# Patient Record
Sex: FEMALE | Race: BLACK OR AFRICAN AMERICAN | Employment: OTHER | URBAN - METROPOLITAN AREA
[De-identification: names, ages, dates, MRNs, and addresses within clinical notes are randomized per-mention and may not be internally consistent; named-entity substitution may affect disease eponyms.]

---

## 2019-12-04 ENCOUNTER — APPOINTMENT (OUTPATIENT)
Dept: GENERAL RADIOLOGY | Age: 29
End: 2019-12-04
Attending: EMERGENCY MEDICINE
Payer: MEDICAID

## 2019-12-04 ENCOUNTER — HOSPITAL ENCOUNTER (EMERGENCY)
Age: 29
Discharge: HOME | End: 2019-12-04
Attending: EMERGENCY MEDICINE
Payer: MEDICAID

## 2019-12-04 VITALS
HEART RATE: 68 BPM | TEMPERATURE: 98.4 F | SYSTOLIC BLOOD PRESSURE: 117 MMHG | OXYGEN SATURATION: 99 % | RESPIRATION RATE: 21 BRPM | DIASTOLIC BLOOD PRESSURE: 75 MMHG

## 2019-12-04 DIAGNOSIS — R07.89 ATYPICAL CHEST PAIN: Primary | ICD-10-CM

## 2019-12-04 LAB
ALBUMIN SERPL-MCNC: 3.3 G/DL (ref 3.4–5)
ALBUMIN/GLOB SERPL: 0.8 {RATIO} (ref 0.8–1.7)
ALP SERPL-CCNC: 64 U/L (ref 45–117)
ALT SERPL-CCNC: 20 U/L (ref 13–56)
ANION GAP SERPL CALC-SCNC: 5 MMOL/L (ref 3–18)
AST SERPL-CCNC: 9 U/L (ref 10–38)
ATRIAL RATE: 54 BPM
BASOPHILS # BLD: 0 K/UL (ref 0–0.1)
BASOPHILS NFR BLD: 0 % (ref 0–2)
BILIRUB SERPL-MCNC: 0.2 MG/DL (ref 0.2–1)
BUN SERPL-MCNC: 6 MG/DL (ref 7–18)
BUN/CREAT SERPL: 9 (ref 12–20)
CALCIUM SERPL-MCNC: 8.3 MG/DL (ref 8.5–10.1)
CALCULATED P AXIS, ECG09: 66 DEGREES
CALCULATED R AXIS, ECG10: 80 DEGREES
CALCULATED T AXIS, ECG11: 83 DEGREES
CHLORIDE SERPL-SCNC: 110 MMOL/L (ref 100–111)
CK MB CFR SERPL CALC: NORMAL % (ref 0–4)
CK MB SERPL-MCNC: <1 NG/ML (ref 5–25)
CK SERPL-CCNC: 60 U/L (ref 26–192)
CO2 SERPL-SCNC: 26 MMOL/L (ref 21–32)
CREAT SERPL-MCNC: 0.64 MG/DL (ref 0.6–1.3)
DIAGNOSIS, 93000: NORMAL
DIFFERENTIAL METHOD BLD: ABNORMAL
EOSINOPHIL # BLD: 0.1 K/UL (ref 0–0.4)
EOSINOPHIL NFR BLD: 1 % (ref 0–5)
ERYTHROCYTE [DISTWIDTH] IN BLOOD BY AUTOMATED COUNT: 14.1 % (ref 11.6–14.5)
GLOBULIN SER CALC-MCNC: 3.9 G/DL (ref 2–4)
GLUCOSE SERPL-MCNC: 106 MG/DL (ref 74–99)
HCT VFR BLD AUTO: 30.2 % (ref 35–45)
HGB BLD-MCNC: 9.7 G/DL (ref 12–16)
LYMPHOCYTES # BLD: 2.1 K/UL (ref 0.9–3.6)
LYMPHOCYTES NFR BLD: 32 % (ref 21–52)
MCH RBC QN AUTO: 27.6 PG (ref 24–34)
MCHC RBC AUTO-ENTMCNC: 32.1 G/DL (ref 31–37)
MCV RBC AUTO: 85.8 FL (ref 74–97)
MONOCYTES # BLD: 0.5 K/UL (ref 0.05–1.2)
MONOCYTES NFR BLD: 8 % (ref 3–10)
NEUTS SEG # BLD: 4 K/UL (ref 1.8–8)
NEUTS SEG NFR BLD: 59 % (ref 40–73)
P-R INTERVAL, ECG05: 170 MS
PLATELET # BLD AUTO: 376 K/UL (ref 135–420)
PMV BLD AUTO: 9.9 FL (ref 9.2–11.8)
POTASSIUM SERPL-SCNC: 3.7 MMOL/L (ref 3.5–5.5)
PROT SERPL-MCNC: 7.2 G/DL (ref 6.4–8.2)
Q-T INTERVAL, ECG07: 470 MS
QRS DURATION, ECG06: 84 MS
QTC CALCULATION (BEZET), ECG08: 445 MS
RBC # BLD AUTO: 3.52 M/UL (ref 4.2–5.3)
SODIUM SERPL-SCNC: 141 MMOL/L (ref 136–145)
TROPONIN I SERPL-MCNC: <0.02 NG/ML (ref 0–0.04)
VENTRICULAR RATE, ECG03: 54 BPM
WBC # BLD AUTO: 6.8 K/UL (ref 4.6–13.2)

## 2019-12-04 PROCEDURE — 82550 ASSAY OF CK (CPK): CPT

## 2019-12-04 PROCEDURE — 99285 EMERGENCY DEPT VISIT HI MDM: CPT

## 2019-12-04 PROCEDURE — 71045 X-RAY EXAM CHEST 1 VIEW: CPT

## 2019-12-04 PROCEDURE — 80053 COMPREHEN METABOLIC PANEL: CPT

## 2019-12-04 PROCEDURE — 85025 COMPLETE CBC W/AUTO DIFF WBC: CPT

## 2019-12-04 PROCEDURE — 93005 ELECTROCARDIOGRAM TRACING: CPT

## 2019-12-04 NOTE — DISCHARGE INSTRUCTIONS

## 2019-12-04 NOTE — ED TRIAGE NOTES
Patient came in by medic with complaint of chest pain. Patient states \"Today, I have been having chest pain on and off all day\". Patient is A/Ox4 and resting in bed requesting blanket and pillows.

## 2019-12-04 NOTE — ED PROVIDER NOTES
EMERGENCY DEPARTMENT HISTORY AND PHYSICAL EXAM    4:47 AM      Date: 12/4/2019  Patient Name: Beulah Zhong    History of Presenting Illness     Chief Complaint   Patient presents with    Chest Pain         History Provided By: Patient    Additional History (Context): Beulah Zhong is a 30 y.o. female with no relevant past medical history who presents with pain in the lower central part of her chest without radiation. She describes it is sharp, intermittent, denies nausea, vomiting, shortness of breath, lightheadedness, sweating or other associated symptoms. Denies any recent illness. PCP: None        Past History     Past Medical History:  History reviewed. No pertinent past medical history. Past Surgical History:  History reviewed. No pertinent surgical history. Family History:  History reviewed. No pertinent family history. Social History:  Social History     Tobacco Use    Smoking status: Never Smoker    Smokeless tobacco: Never Used   Substance Use Topics    Alcohol use: Not Currently    Drug use: Never       Allergies:  No Known Allergies      Review of Systems       Review of Systems   Constitutional: Negative for activity change and appetite change. HENT: Negative for congestion. Eyes: Negative for visual disturbance. Respiratory: Negative for cough and shortness of breath. Cardiovascular: Positive for chest pain. Gastrointestinal: Negative for abdominal pain, diarrhea, nausea and vomiting. Genitourinary: Negative for dysuria. Musculoskeletal: Negative for arthralgias and myalgias. Skin: Negative for rash. Neurological: Negative for weakness and numbness. Physical Exam     Visit Vitals  /68   Pulse (!) 56   Temp 98.4 °F (36.9 °C)   Resp 15   SpO2 99%         Physical Exam  Vitals signs and nursing note reviewed. Constitutional:       Appearance: She is well-developed. HENT:      Head: Normocephalic and atraumatic.    Eyes: Conjunctiva/sclera: Conjunctivae normal.   Neck:      Musculoskeletal: Normal range of motion and neck supple. Vascular: No JVD. Cardiovascular:      Rate and Rhythm: Normal rate and regular rhythm. Heart sounds: Normal heart sounds. No murmur. Pulmonary:      Effort: Pulmonary effort is normal.      Breath sounds: Normal breath sounds. Abdominal:      General: Bowel sounds are normal. There is no distension. Palpations: Abdomen is soft. Tenderness: There is no tenderness. Musculoskeletal: Normal range of motion. General: No deformity. Lymphadenopathy:      Cervical: No cervical adenopathy. Skin:     General: Skin is warm and dry. Findings: No rash. Neurological:      Mental Status: She is alert and oriented to person, place, and time. Coordination: Coordination normal.           Diagnostic Study Results     Labs -  Recent Results (from the past 12 hour(s))   CBC WITH AUTOMATED DIFF    Collection Time: 12/04/19  1:21 AM   Result Value Ref Range    WBC 6.8 4.6 - 13.2 K/uL    RBC 3.52 (L) 4.20 - 5.30 M/uL    HGB 9.7 (L) 12.0 - 16.0 g/dL    HCT 30.2 (L) 35.0 - 45.0 %    MCV 85.8 74.0 - 97.0 FL    MCH 27.6 24.0 - 34.0 PG    MCHC 32.1 31.0 - 37.0 g/dL    RDW 14.1 11.6 - 14.5 %    PLATELET 269 415 - 046 K/uL    MPV 9.9 9.2 - 11.8 FL    NEUTROPHILS 59 40 - 73 %    LYMPHOCYTES 32 21 - 52 %    MONOCYTES 8 3 - 10 %    EOSINOPHILS 1 0 - 5 %    BASOPHILS 0 0 - 2 %    ABS. NEUTROPHILS 4.0 1.8 - 8.0 K/UL    ABS. LYMPHOCYTES 2.1 0.9 - 3.6 K/UL    ABS. MONOCYTES 0.5 0.05 - 1.2 K/UL    ABS. EOSINOPHILS 0.1 0.0 - 0.4 K/UL    ABS.  BASOPHILS 0.0 0.0 - 0.1 K/UL    DF AUTOMATED     METABOLIC PANEL, COMPREHENSIVE    Collection Time: 12/04/19  1:21 AM   Result Value Ref Range    Sodium 141 136 - 145 mmol/L    Potassium 3.7 3.5 - 5.5 mmol/L    Chloride 110 100 - 111 mmol/L    CO2 26 21 - 32 mmol/L    Anion gap 5 3.0 - 18 mmol/L    Glucose 106 (H) 74 - 99 mg/dL    BUN 6 (L) 7.0 - 18 MG/DL Creatinine 0.64 0.6 - 1.3 MG/DL    BUN/Creatinine ratio 9 (L) 12 - 20      GFR est AA >60 >60 ml/min/1.73m2    GFR est non-AA >60 >60 ml/min/1.73m2    Calcium 8.3 (L) 8.5 - 10.1 MG/DL    Bilirubin, total 0.2 0.2 - 1.0 MG/DL    ALT (SGPT) 20 13 - 56 U/L    AST (SGOT) 9 (L) 10 - 38 U/L    Alk. phosphatase 64 45 - 117 U/L    Protein, total 7.2 6.4 - 8.2 g/dL    Albumin 3.3 (L) 3.4 - 5.0 g/dL    Globulin 3.9 2.0 - 4.0 g/dL    A-G Ratio 0.8 0.8 - 1.7     CARDIAC PANEL,(CK, CKMB & TROPONIN)    Collection Time: 12/04/19  1:21 AM   Result Value Ref Range    CK 60 26 - 192 U/L    CK - MB <1.0 <3.6 ng/ml    CK-MB Index  0.0 - 4.0 %     CALCULATION NOT PERFORMED WHEN RESULT IS BELOW LINEAR LIMIT    Troponin-I, QT <0.02 0.0 - 0.045 NG/ML       Radiologic Studies -   XR CHEST PORT    (Results Pending)         Medical Decision Making   I am the first provider for this patient. I reviewed the vital signs, available nursing notes, past medical history, past surgical history, family history and social history. Vital Signs-Reviewed the patient's vital signs. EKG:  Normal sinus rhythm, rate 54, , QTc 445. No acute ST or T wave changes, no STEMI. Records Reviewed: Nursing Notes (Time of Review: 4:47 AM)      Provider Notes (Medical Decision Making):   Balta Schafer is a 30 y.o. female with no relevant past medical history who presents with pain in the lower central part of her chest without radiation. She describes it is sharp, intermittent, denies nausea, vomiting, shortness of breath, lightheadedness, sweating or other associated symptoms. Denies any recent illness. She appears well on exam, EKG is unremarkable. Differential Diagnosis: Patient with atypical chest pain, question reflux, inflammatory or muscular condition. Given her well appearance, lack of risk factors I do not suspect ACS, I do not suspect pulmonary embolism, dissection or other acute etiology.     Testing: Chest x-ray, labs ordered from triage were unremarkable  Treatments: Tylenol, ibuprofen as needed. Given return precautions and home care instructions. Diagnosis     Clinical Impression:   1. Atypical chest pain        Disposition: Charge    Follow-up Information     Follow up With Specialties Details Why [facility]    [facility] EMERGENCY DEPT Emergency Medicine  If symptoms worsen [address] [place] [id number]  [phone]           Patient's Medications    No medications on file     _______________________________    Attestations:  Karissa Ortega MD acting as a scribe for and in the presence of Joann Hernandez MD      December 04, 2019 at 4:47 AM       Provider Attestation:      I personally performed the services described in the documentation, reviewed the documentation, as recorded by the scribe in my presence, and it accurately and completely records my words and actions.  December 04, 2019 at 4:47 AM - Joann Hernandez MD    _______________________________

## 2019-12-20 ENCOUNTER — HOSPITAL ENCOUNTER (EMERGENCY)
Age: 29
Discharge: PSYCHIATRIC OR SUBSTANCE USE FACILITY | End: 2019-12-21
Attending: EMERGENCY MEDICINE
Payer: MEDICAID

## 2019-12-20 DIAGNOSIS — R44.0 AUDITORY HALLUCINATIONS: ICD-10-CM

## 2019-12-20 DIAGNOSIS — F10.929 ALCOHOLIC INTOXICATION WITH COMPLICATION (HCC): ICD-10-CM

## 2019-12-20 DIAGNOSIS — R45.851 DEPRESSION WITH SUICIDAL IDEATION: ICD-10-CM

## 2019-12-20 DIAGNOSIS — F32.A DEPRESSION WITH SUICIDAL IDEATION: ICD-10-CM

## 2019-12-20 DIAGNOSIS — R45.850 HOMICIDAL IDEATIONS: Primary | ICD-10-CM

## 2019-12-20 LAB
ALBUMIN SERPL-MCNC: 3.7 G/DL (ref 3.4–5)
ALBUMIN/GLOB SERPL: 0.8 {RATIO} (ref 0.8–1.7)
ALP SERPL-CCNC: 82 U/L (ref 45–117)
ALT SERPL-CCNC: 139 U/L (ref 13–56)
AMPHET UR QL SCN: NEGATIVE
ANION GAP SERPL CALC-SCNC: 6 MMOL/L (ref 3–18)
APPEARANCE UR: CLEAR
AST SERPL-CCNC: 54 U/L (ref 10–38)
BACTERIA URNS QL MICRO: NEGATIVE /HPF
BARBITURATES UR QL SCN: NEGATIVE
BASOPHILS # BLD: 0 K/UL (ref 0–0.1)
BASOPHILS NFR BLD: 0 % (ref 0–2)
BENZODIAZ UR QL: NEGATIVE
BILIRUB SERPL-MCNC: 0.2 MG/DL (ref 0.2–1)
BILIRUB UR QL: NEGATIVE
BUN SERPL-MCNC: 9 MG/DL (ref 7–18)
BUN/CREAT SERPL: 13 (ref 12–20)
CALCIUM SERPL-MCNC: 9.5 MG/DL (ref 8.5–10.1)
CANNABINOIDS UR QL SCN: NEGATIVE
CHLORIDE SERPL-SCNC: 104 MMOL/L (ref 100–111)
CO2 SERPL-SCNC: 24 MMOL/L (ref 21–32)
COCAINE UR QL SCN: NEGATIVE
COLOR UR: YELLOW
CREAT SERPL-MCNC: 0.67 MG/DL (ref 0.6–1.3)
DIFFERENTIAL METHOD BLD: ABNORMAL
EOSINOPHIL # BLD: 0 K/UL (ref 0–0.4)
EOSINOPHIL NFR BLD: 0 % (ref 0–5)
EPITH CASTS URNS QL MICRO: NORMAL /LPF (ref 0–5)
ERYTHROCYTE [DISTWIDTH] IN BLOOD BY AUTOMATED COUNT: 13.7 % (ref 11.6–14.5)
ETHANOL SERPL-MCNC: 133 MG/DL (ref 0–3)
ETHANOL SERPL-MCNC: <3 MG/DL (ref 0–3)
GLOBULIN SER CALC-MCNC: 4.4 G/DL (ref 2–4)
GLUCOSE SERPL-MCNC: 116 MG/DL (ref 74–99)
GLUCOSE UR STRIP.AUTO-MCNC: NEGATIVE MG/DL
HCG UR QL: NEGATIVE
HCT VFR BLD AUTO: 34 % (ref 35–45)
HDSCOM,HDSCOM: NORMAL
HGB BLD-MCNC: 10.7 G/DL (ref 12–16)
HGB UR QL STRIP: ABNORMAL
KETONES UR QL STRIP.AUTO: NEGATIVE MG/DL
LEUKOCYTE ESTERASE UR QL STRIP.AUTO: NEGATIVE
LITHIUM SERPL-SCNC: 0.5 MMOL/L (ref 0.6–1.2)
LYMPHOCYTES # BLD: 1.3 K/UL (ref 0.9–3.6)
LYMPHOCYTES NFR BLD: 16 % (ref 21–52)
MCH RBC QN AUTO: 27.9 PG (ref 24–34)
MCHC RBC AUTO-ENTMCNC: 31.5 G/DL (ref 31–37)
MCV RBC AUTO: 88.8 FL (ref 74–97)
METHADONE UR QL: NEGATIVE
MONOCYTES # BLD: 0.2 K/UL (ref 0.05–1.2)
MONOCYTES NFR BLD: 2 % (ref 3–10)
NEUTS SEG # BLD: 6.9 K/UL (ref 1.8–8)
NEUTS SEG NFR BLD: 82 % (ref 40–73)
NITRITE UR QL STRIP.AUTO: NEGATIVE
OPIATES UR QL: NEGATIVE
PCP UR QL: NEGATIVE
PH UR STRIP: 5.5 [PH] (ref 5–8)
PLATELET # BLD AUTO: 310 K/UL (ref 135–420)
PMV BLD AUTO: 9.3 FL (ref 9.2–11.8)
POTASSIUM SERPL-SCNC: 4.2 MMOL/L (ref 3.5–5.5)
PROT SERPL-MCNC: 8.1 G/DL (ref 6.4–8.2)
PROT UR STRIP-MCNC: NEGATIVE MG/DL
RBC # BLD AUTO: 3.83 M/UL (ref 4.2–5.3)
RBC #/AREA URNS HPF: NORMAL /HPF (ref 0–5)
SODIUM SERPL-SCNC: 134 MMOL/L (ref 136–145)
SP GR UR REFRACTOMETRY: <1.005 (ref 1–1.03)
UROBILINOGEN UR QL STRIP.AUTO: 0.2 EU/DL (ref 0.2–1)
WBC # BLD AUTO: 8.4 K/UL (ref 4.6–13.2)
WBC URNS QL MICRO: NEGATIVE /HPF (ref 0–4)

## 2019-12-20 PROCEDURE — 99285 EMERGENCY DEPT VISIT HI MDM: CPT

## 2019-12-20 PROCEDURE — 74011250637 HC RX REV CODE- 250/637: Performed by: EMERGENCY MEDICINE

## 2019-12-20 PROCEDURE — 81025 URINE PREGNANCY TEST: CPT

## 2019-12-20 PROCEDURE — 80307 DRUG TEST PRSMV CHEM ANLYZR: CPT

## 2019-12-20 PROCEDURE — 80178 ASSAY OF LITHIUM: CPT

## 2019-12-20 PROCEDURE — 81001 URINALYSIS AUTO W/SCOPE: CPT

## 2019-12-20 PROCEDURE — 80053 COMPREHEN METABOLIC PANEL: CPT

## 2019-12-20 PROCEDURE — 85025 COMPLETE CBC W/AUTO DIFF WBC: CPT

## 2019-12-20 RX ORDER — TRAZODONE HYDROCHLORIDE 50 MG/1
100 TABLET ORAL
Status: DISCONTINUED | OUTPATIENT
Start: 2019-12-20 | End: 2019-12-21 | Stop reason: HOSPADM

## 2019-12-20 RX ORDER — LITHIUM CARBONATE 300 MG/1
300 TABLET, FILM COATED, EXTENDED RELEASE ORAL 2 TIMES DAILY
Status: DISCONTINUED | OUTPATIENT
Start: 2019-12-20 | End: 2019-12-21 | Stop reason: HOSPADM

## 2019-12-20 RX ORDER — RISPERIDONE 1 MG/1
2 TABLET, FILM COATED ORAL
Status: DISCONTINUED | OUTPATIENT
Start: 2019-12-20 | End: 2019-12-21 | Stop reason: HOSPADM

## 2019-12-20 RX ADMIN — RISPERIDONE 2 MG: 1 TABLET ORAL at 02:19

## 2019-12-20 RX ADMIN — LITHIUM CARBONATE 300 MG: 300 TABLET, EXTENDED RELEASE ORAL at 08:32

## 2019-12-20 RX ADMIN — TRAZODONE HYDROCHLORIDE 100 MG: 50 TABLET ORAL at 02:19

## 2019-12-20 RX ADMIN — LITHIUM CARBONATE 300 MG: 300 TABLET, EXTENDED RELEASE ORAL at 18:44

## 2019-12-20 RX ADMIN — TRAZODONE HYDROCHLORIDE 100 MG: 50 TABLET ORAL at 22:02

## 2019-12-20 RX ADMIN — RISPERIDONE 2 MG: 1 TABLET ORAL at 22:02
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
## 2019-12-20 NOTE — PROGRESS NOTES
Call placed to [facility], [facility] behavioral health unit. Spoke with charge nurse Nelson and told that pt will not be considered for admission as she has recently discharged. Call received from [facility] at [address] [place] [address] who has requested to speak with pt directly. [facility] transferred to main ED.        Gustavo Gallego MSN, RN, ACM-RN   ED Outcomes Manager  [phone] (phone)

## 2019-12-20 NOTE — PROGRESS NOTES
Call received from Barron Jiménez at [facility] notifying me that pt was declined by their physician as she was just discharged from [facility] and needed to return there for continuity of care. I informed Barron Jiménez that the pt was declined by [facility] and Barron Jiménez replied \"well that's between you and them. \"          Ti Ricci, MSN, RN, ACM-RN   ED Outcomes Manager  [phone] (phone)

## 2019-12-20 NOTE — ANCILLARY DISCHARGE INSTRUCTIONS
Call made to [facility], [phone], spoke with Monae, will review patient's chart. Patient is willing to travel for treatment.
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
## 2019-12-20 NOTE — ED PROVIDER NOTES
Mauricio Minor is a 30 y.o. female who was just discharged from [facility] today and has history of schizoaffective disorder with complaints of feeling homicidal with plans to shoot other people and possibly herself and having auditory hallucinations. Patient does not actually have access to a weapon. Patient states she also lost her medications. She has no other acute medical complaints. She also states she lost her medications when she was discharged    The history is provided by the patient and medical records. Past Medical History:   Diagnosis Date    Schizoaffective disorder ([facility] [address]        History reviewed. No pertinent surgical history. History reviewed. No pertinent family history.     Social History     Socioeconomic History    Marital status: SINGLE     Spouse name: Not on file    Number of children: Not on file    Years of education: Not on file    Highest education level: Not on file   Occupational History    Not on file   Social Needs    Financial resource strain: Not on file    Food insecurity:     Worry: Not on file     Inability: Not on file    Transportation needs:     Medical: Not on file     Non-medical: Not on file   Tobacco Use    Smoking status: Never Smoker    Smokeless tobacco: Never Used   Substance and Sexual Activity    Alcohol use: Not Currently    Drug use: Never    Sexual activity: Not on file   Lifestyle    Physical activity:     Days per week: Not on file     Minutes per session: Not on file    Stress: Not on file   Relationships    Social connections:     Talks on phone: Not on file     Gets together: Not on file     Attends Zoroastrian service: Not on file     Active member of club or organization: Not on file     Attends meetings of clubs or organizations: Not on file     Relationship status: Not on file    Intimate partner violence:     Fear of current or ex partner: Not on file     Emotionally abused: Not on file     Physically abused: Not on file     Forced sexual activity: Not on file   Other Topics Concern    Not on file   Social History Narrative    Not on file         ALLERGIES: Patient has no known allergies. Review of Systems   Constitutional: Negative for fever. HENT: Negative for sore throat. Eyes: Negative for visual disturbance. Respiratory: Negative for shortness of breath. Gastrointestinal: Negative for abdominal pain. Genitourinary: Negative for difficulty urinating. Musculoskeletal: Negative for gait problem. Skin: Negative for rash. Allergic/Immunologic: Negative for immunocompromised state. Neurological: Negative for syncope. Psychiatric/Behavioral: Positive for hallucinations, sleep disturbance and suicidal ideas. Vitals:    12/20/19 0134   BP: 122/79   Pulse: 86   Resp: 16   Temp: 97.6 °F (36.4 °C)   SpO2: 100%   Weight: 85.3 kg (188 lb)   Height: 5' 7\" (1.702 m)            Physical Exam  Vitals signs and nursing note reviewed. Constitutional:       General: She is not in acute distress. Appearance: She is well-developed. She is obese. She is not ill-appearing, toxic-appearing or diaphoretic. HENT:      Head: Normocephalic and atraumatic. Right Ear: External ear normal.      Left Ear: External ear normal.      Nose: Nose normal.      Mouth/Throat:      Pharynx: Uvula midline. Eyes:      General: No scleral icterus. Conjunctiva/sclera: Conjunctivae normal.   Neck:      Musculoskeletal: Neck supple. Cardiovascular:      Rate and Rhythm: Normal rate and regular rhythm. Heart sounds: Normal heart sounds. Pulmonary:      Effort: Pulmonary effort is normal.      Breath sounds: Normal breath sounds. Abdominal:      Palpations: Abdomen is soft. Tenderness: There is no tenderness. Skin:     General: Skin is warm and dry. Capillary Refill: Capillary refill takes less than 2 seconds.    Neurological:      Mental Status: She is alert and oriented to person, place, and time.      Gait: Gait normal.   Psychiatric:         Attention and Perception: She perceives auditory hallucinations. Mood and Affect: Mood is anxious. Speech: Speech normal.         Behavior: Behavior normal.         Thought Content: Thought content includes homicidal and suicidal ideation. Thought content includes homicidal and suicidal plan. MDM       Procedures    Vitals:  Patient Vitals for the past 12 hrs:   Temp Pulse Resp BP SpO2   12/20/19 0134 97.6 °F (36.4 °C) 86 16 122/79 100 %         Medications ordered:   Medications   lithium carbonate SR (LITHOBID) tablet 300 mg (has no administration in time range)   risperiDONE (RisperDAL) tablet 2 mg (2 mg Oral Given 12/20/19 0219)   traZODone (DESYREL) tablet 100 mg (100 mg Oral Given 12/20/19 0219)         Lab findings:  Recent Results (from the past 12 hour(s))   CBC WITH AUTOMATED DIFF    Collection Time: 12/20/19  2:13 AM   Result Value Ref Range    WBC 8.4 4.6 - 13.2 K/uL    RBC 3.83 (L) 4.20 - 5.30 M/uL    HGB 10.7 (L) 12.0 - 16.0 g/dL    HCT 34.0 (L) 35.0 - 45.0 %    MCV 88.8 74.0 - 97.0 FL    MCH 27.9 24.0 - 34.0 PG    MCHC 31.5 31.0 - 37.0 g/dL    RDW 13.7 11.6 - 14.5 %    PLATELET 105 397 - 406 K/uL    MPV 9.3 9.2 - 11.8 FL    NEUTROPHILS 82 (H) 40 - 73 %    LYMPHOCYTES 16 (L) 21 - 52 %    MONOCYTES 2 (L) 3 - 10 %    EOSINOPHILS 0 0 - 5 %    BASOPHILS 0 0 - 2 %    ABS. NEUTROPHILS 6.9 1.8 - 8.0 K/UL    ABS. LYMPHOCYTES 1.3 0.9 - 3.6 K/UL    ABS. MONOCYTES 0.2 0.05 - 1.2 K/UL    ABS. EOSINOPHILS 0.0 0.0 - 0.4 K/UL    ABS.  BASOPHILS 0.0 0.0 - 0.1 K/UL    DF AUTOMATED     METABOLIC PANEL, COMPREHENSIVE    Collection Time: 12/20/19  2:13 AM   Result Value Ref Range    Sodium 134 (L) 136 - 145 mmol/L    Potassium 4.2 3.5 - 5.5 mmol/L    Chloride 104 100 - 111 mmol/L    CO2 24 21 - 32 mmol/L    Anion gap 6 3.0 - 18 mmol/L    Glucose 116 (H) 74 - 99 mg/dL    BUN 9 7.0 - 18 MG/DL    Creatinine 0.67 0.6 - 1.3 MG/DL    BUN/Creatinine ratio 13 12 - 20      GFR est AA >60 >60 ml/min/1.73m2    GFR est non-AA >60 >60 ml/min/1.73m2    Calcium 9.5 8.5 - 10.1 MG/DL    Bilirubin, total 0.2 0.2 - 1.0 MG/DL    ALT (SGPT) 139 (H) 13 - 56 U/L    AST (SGOT) 54 (H) 10 - 38 U/L    Alk. phosphatase 82 45 - 117 U/L    Protein, total 8.1 6.4 - 8.2 g/dL    Albumin 3.7 3.4 - 5.0 g/dL    Globulin 4.4 (H) 2.0 - 4.0 g/dL    A-G Ratio 0.8 0.8 - 1.7     ETHYL ALCOHOL    Collection Time: 12/20/19  2:13 AM   Result Value Ref Range    ALCOHOL(ETHYL),SERUM 133 (H) 0 - 3 MG/DL   URINALYSIS W/ RFLX MICROSCOPIC    Collection Time: 12/20/19  2:13 AM   Result Value Ref Range    Color YELLOW      Appearance CLEAR      Specific gravity <1.005 (L) 1.005 - 1.030    pH (UA) 5.5 5.0 - 8.0      Protein NEGATIVE  NEG mg/dL    Glucose NEGATIVE  NEG mg/dL    Ketone NEGATIVE  NEG mg/dL    Bilirubin NEGATIVE  NEG      Blood LARGE (A) NEG      Urobilinogen 0.2 0.2 - 1.0 EU/dL    Nitrites NEGATIVE  NEG      Leukocyte Esterase NEGATIVE  NEG     HCG URINE, QL    Collection Time: 12/20/19  2:13 AM   Result Value Ref Range    HCG urine, QL NEGATIVE  NEG     DRUG SCREEN, URINE    Collection Time: 12/20/19  2:13 AM   Result Value Ref Range    BENZODIAZEPINES NEGATIVE  NEG      BARBITURATES NEGATIVE  NEG      THC (TH-CANNABINOL) NEGATIVE  NEG      OPIATES NEGATIVE  NEG      PCP(PHENCYCLIDINE) NEGATIVE  NEG      COCAINE NEGATIVE  NEG      AMPHETAMINES NEGATIVE  NEG      METHADONE NEGATIVE  NEG      HDSCOM (NOTE)    URINE MICROSCOPIC ONLY    Collection Time: 12/20/19  2:13 AM   Result Value Ref Range    WBC NEGATIVE  0 - 4 /hpf    RBC 11 to 20 0 - 5 /hpf    Epithelial cells FEW 0 - 5 /lpf    Bacteria NEGATIVE  NEG /hpf       EKG interpretation by ED Physician:      X-Ray, CT or other radiology findings or impressions:  No orders to display       Progress notes, Consult notes or additional Procedure notes:   Patient seen by tele-psychiatry who recommends inpatient the patient is voluntary.     We will place patient back on her medications as noted in her other records. Will turn over to Dr. Primo Wilkes at approximate 6 AM to follow-up on placement. Reevaluation of patient:   stable with no acute medical condition that would prevent transfer to mental health facility    Disposition:  Diagnosis:   1. Homicidal ideations    2. Auditory hallucinations    3. Depression with suicidal ideation    4.  Alcoholic intoxication with complication ([facility] [address]        Disposition: pending    Follow-up Information    None           Patient's Medications    No medications on file

## 2019-12-20 NOTE — ED TRIAGE NOTES
Pt ambulatory to triage c/o suicidal ideation with thoughts of using a firearm to end her life, was just discharged from  psych today, states she was d/c with medications that were filled but lost. Generalized HI without plan. Denies hearing voices, but states \"voices talk for me\". Initially denies visual hallucinations but continues to say \"I see things, faces of dead people\". Admits to occasional marijuana use and frequent ETOH abuse, states had about 4 beers prior to coming to ED tonight. Pt states she does not feel safe as she is homeless and has often \"had sex with someone to sleep at their place\", however denies any abuse.

## 2019-12-20 NOTE — PROGRESS NOTES
Pt noted to have been discharged from  Psych yesterday, per triage note. Call placed to [facility] to inquire about admission. Per Shane Osorio in admissions, pt has not been to their facility since July 2019. Shane Osorio stated that she will review pt for possible bed availability. Information faxed. Call placed to [facility] behavioral health unit. I spoke with , Sisi Douglas who has confirmed that pt was recently hospitalized there. Sisi Douglas stated that the charge nurse was off the unit, and requested information to be faxed. Information faxed.            Vinay Herrera, MSN, RN, ACM-RN   ED Outcomes Manager  [phone] (phone)

## 2019-12-21 ENCOUNTER — HOSPITAL ENCOUNTER (INPATIENT)
Age: 29
LOS: 9 days | Discharge: LEFT BEFORE DISCHARGE | DRG: 750 | End: 2019-12-30
Attending: PSYCHIATRY & NEUROLOGY | Admitting: PSYCHIATRY & NEUROLOGY
Payer: MEDICAID

## 2019-12-21 VITALS
OXYGEN SATURATION: 100 % | WEIGHT: 188 LBS | SYSTOLIC BLOOD PRESSURE: 114 MMHG | DIASTOLIC BLOOD PRESSURE: 67 MMHG | RESPIRATION RATE: 16 BRPM | HEART RATE: 76 BPM | HEIGHT: 67 IN | BODY MASS INDEX: 29.51 KG/M2 | TEMPERATURE: 97.1 F

## 2019-12-21 PROCEDURE — 65220000003 HC RM SEMIPRIVATE PSYCH

## 2019-12-21 PROCEDURE — 74011250637 HC RX REV CODE- 250/637: Performed by: EMERGENCY MEDICINE

## 2019-12-21 PROCEDURE — 74011250637 HC RX REV CODE- 250/637: Performed by: NURSE PRACTITIONER

## 2019-12-21 RX ORDER — BENZTROPINE MESYLATE 1 MG/1
1 TABLET ORAL
Status: DISCONTINUED | OUTPATIENT
Start: 2019-12-21 | End: 2019-12-30 | Stop reason: HOSPADM

## 2019-12-21 RX ORDER — RISPERIDONE 1 MG/1
2 TABLET, FILM COATED ORAL
Status: DISCONTINUED | OUTPATIENT
Start: 2019-12-21 | End: 2019-12-23

## 2019-12-21 RX ORDER — RISPERIDONE 2 MG/1
2 TABLET, FILM COATED ORAL
COMMUNITY
Start: 2019-12-19 | End: 2019-12-30

## 2019-12-21 RX ORDER — TRAZODONE HYDROCHLORIDE 100 MG/1
100 TABLET ORAL
COMMUNITY
Start: 2019-12-19 | End: 2019-12-30

## 2019-12-21 RX ORDER — TRAZODONE HYDROCHLORIDE 50 MG/1
50 TABLET ORAL
Status: DISCONTINUED | OUTPATIENT
Start: 2019-12-21 | End: 2019-12-30 | Stop reason: HOSPADM

## 2019-12-21 RX ORDER — LITHIUM CARBONATE 300 MG/1
300 TABLET, FILM COATED, EXTENDED RELEASE ORAL 2 TIMES DAILY
Status: DISCONTINUED | OUTPATIENT
Start: 2019-12-21 | End: 2019-12-23

## 2019-12-21 RX ORDER — LORAZEPAM 2 MG/ML
1 INJECTION INTRAMUSCULAR
Status: DISCONTINUED | OUTPATIENT
Start: 2019-12-21 | End: 2019-12-30 | Stop reason: HOSPADM

## 2019-12-21 RX ORDER — ACETAMINOPHEN 325 MG/1
650 TABLET ORAL
Status: DISCONTINUED | OUTPATIENT
Start: 2019-12-21 | End: 2019-12-30 | Stop reason: HOSPADM

## 2019-12-21 RX ORDER — OLANZAPINE 5 MG/1
5 TABLET ORAL
Status: DISCONTINUED | OUTPATIENT
Start: 2019-12-21 | End: 2019-12-30 | Stop reason: HOSPADM

## 2019-12-21 RX ORDER — TRAZODONE HYDROCHLORIDE 100 MG/1
100 TABLET ORAL
Status: DISCONTINUED | OUTPATIENT
Start: 2019-12-21 | End: 2019-12-24

## 2019-12-21 RX ORDER — ADHESIVE BANDAGE
30 BANDAGE TOPICAL DAILY PRN
Status: DISCONTINUED | OUTPATIENT
Start: 2019-12-21 | End: 2019-12-30 | Stop reason: HOSPADM

## 2019-12-21 RX ORDER — LITHIUM CARBONATE 300 MG/1
300 TABLET, FILM COATED, EXTENDED RELEASE ORAL 2 TIMES DAILY
COMMUNITY
Start: 2019-12-19 | End: 2019-12-30

## 2019-12-21 RX ORDER — HYDROXYZINE 25 MG/1
50 TABLET, FILM COATED ORAL
Status: DISCONTINUED | OUTPATIENT
Start: 2019-12-21 | End: 2019-12-30 | Stop reason: HOSPADM

## 2019-12-21 RX ORDER — DIPHENHYDRAMINE HYDROCHLORIDE 50 MG/ML
50 INJECTION, SOLUTION INTRAMUSCULAR; INTRAVENOUS
Status: DISCONTINUED | OUTPATIENT
Start: 2019-12-21 | End: 2019-12-30 | Stop reason: HOSPADM

## 2019-12-21 RX ORDER — HALOPERIDOL 5 MG/ML
5 INJECTION INTRAMUSCULAR
Status: DISCONTINUED | OUTPATIENT
Start: 2019-12-21 | End: 2019-12-30 | Stop reason: HOSPADM

## 2019-12-21 RX ADMIN — RISPERIDONE 2 MG: 1 TABLET ORAL at 21:11

## 2019-12-21 RX ADMIN — LITHIUM CARBONATE 300 MG: 300 TABLET, EXTENDED RELEASE ORAL at 08:09

## 2019-12-21 RX ADMIN — LITHIUM CARBONATE 300 MG: 300 TABLET, FILM COATED, EXTENDED RELEASE ORAL at 18:20

## 2019-12-21 RX ADMIN — TRAZODONE HYDROCHLORIDE 100 MG: 100 TABLET ORAL at 21:11

## 2019-12-21 NOTE — PROGRESS NOTES
Patient has an accepting bed at [facility] accepting MD Dr. Spenser Horan, NP Jose Mi to room, 327    Report # [phone]

## 2019-12-21 NOTE — PROGRESS NOTES
Spoke with Beth Hussein she stated that they may still be on a stand still, but took information and will confirm with MD and nursing. Spoke with Julia Fuentes at [address], will review and let us know if can accommodate. Spoke with Duke Farias they will review and let us know. Spoke with Julia Fuentes / [facility] she has the information and will review and let us know    [facility] will review the information and let us know. Spoke with Chastity at [facility] they will review the information and let us know. Spoke with Bina Bedoya  / Judith Sharif will review information and let us know. Spoke [facility] they will review and let us know. Spoke with Mickie Laureano still on stand down at this time due to staffing.

## 2019-12-21 NOTE — ED NOTES
11:40 PM :Pt care assumed from Dr. Aileen Rushing , ED provider. Pt complaint(s), current treatment plan, progression and available diagnostic results have been discussed thoroughly. Rounding occurred: yes  Intended Disposition: ADMIT   Pending diagnostic reports and/or labs (please list): pending telepsych eval and bed search.  Remains stable at this time

## 2019-12-22 PROCEDURE — 74011250636 HC RX REV CODE- 250/636: Performed by: NURSE PRACTITIONER

## 2019-12-22 PROCEDURE — 65220000003 HC RM SEMIPRIVATE PSYCH

## 2019-12-22 PROCEDURE — 74011250637 HC RX REV CODE- 250/637: Performed by: NURSE PRACTITIONER

## 2019-12-22 RX ADMIN — RISPERIDONE 2 MG: 1 TABLET ORAL at 20:26

## 2019-12-22 RX ADMIN — TRAZODONE HYDROCHLORIDE 100 MG: 100 TABLET ORAL at 21:09

## 2019-12-22 RX ADMIN — LORAZEPAM 1 MG: 2 INJECTION, SOLUTION INTRAMUSCULAR; INTRAVENOUS at 18:15

## 2019-12-22 RX ADMIN — LITHIUM CARBONATE 300 MG: 300 TABLET, FILM COATED, EXTENDED RELEASE ORAL at 17:43

## 2019-12-22 RX ADMIN — HALOPERIDOL LACTATE 5 MG: 5 INJECTION, SOLUTION INTRAMUSCULAR at 18:15

## 2019-12-22 RX ADMIN — LITHIUM CARBONATE 300 MG: 300 TABLET, FILM COATED, EXTENDED RELEASE ORAL at 11:39

## 2019-12-22 RX ADMIN — DIPHENHYDRAMINE HYDROCHLORIDE 50 MG: 50 INJECTION, SOLUTION INTRAMUSCULAR; INTRAVENOUS at 18:15

## 2019-12-22 NOTE — PROGRESS NOTES
Patient slept late this morning. She refused her Lithium 300 mg stating \" I'm not going to take it until I see the doctor first\". Offered  her medication again prior to lunch, she agreed to take it if she could have ginger ale. Given.

## 2019-12-22 NOTE — H&P
Psychiatry History and Physical    Subjective:     Date of Evaluation:  12/22/2019    History of Presenting Problem: Toponas Salts \"Q\" Jennifer Scott is a 30year old female with a history of schizoaffective disorder admitted to [address] [place] [address] for increased depression and suicidal ideation. She also reports AH and \"people who speak for her. \" She was recently discharged from the psychiatric unit at [facility]. She does not think the the medications given in her most recent hospitalization have been helpful, risperidone and lithium. She would like to take seroquel again, she reports an improvement in sleep with seroquel but reports that \"people speaking for her\" will never go away. She does have multiple social stressors such as losing custody of her children due to neglect. There are no active problems to display for this patient. Past Medical History:   Diagnosis Date    Schizoaffective disorder ([address]      No past surgical history on file. No family history on file. Social History     Tobacco Use    Smoking status: Never Smoker    Smokeless tobacco: Never Used   Substance Use Topics    Alcohol use: Not Currently     Prior to Admission medications    Medication Sig Start Date End Date Taking? Authorizing Provider   lithium carbonate SR (LITHOBID) 300 mg CR tablet Take 300 mg by mouth two (2) times a day. 12/19/19   Provider, Historical   risperiDONE (RISPERDAL) 2 mg tablet Take 2 mg by mouth nightly. 12/19/19   Provider, Historical   traZODone (DESYREL) 100 mg tablet Take 100 mg by mouth nightly.  12/19/19   Provider, Historical     No Known Allergies       Objective:     Patient Vitals for the past 8 hrs:   BP Temp Pulse Resp   12/22/19 1235 107/60 98.1 °F (36.7 °C) 69 20       Mental Status exam: WNL except for    Sensorium  oriented to time, place and person   Orientation person, place, time/date and situation   Relations cooperative   Eye Contact appropriate   Appearance:  age appropriate   Motor Behavior:  within normal limits   Speech:  normal pitch and normal volume   Vocabulary average   Thought Process: within normal limits   Thought Content hallucinations   Suicidal ideations no plan    Homicidal ideations none   Mood:  within normal limits   Affect:  flat   Memory recent  adequate   Memory remote:  adequate   Concentration:  adequate   Abstraction:  concrete   Insight:  limited   Reliability fair   Judgment:  limited         Physical Exam  See medical H&P    Impression:      Active Problems:    * No active hospital problems.  *    Schizoaffective Disorder, bipolar type  R/O malingering    Plan:     Admit to [facility]  Medication management  Gather collateral information  Disposition planning with social work  Estimated length of stay 5-7 days       Makayla Floyd, PMHNP-BC  December 22, 2019

## 2019-12-22 NOTE — PROGRESS NOTES
36 Pt arrived to [facility] as a voluntary admission. Report states that she is depressed and has S/I with a plan to cut herself or use a firearm. Report also stated H/I plan to use a firearm on other people. Auditory hallucinations that sound like voices. Nightmares. Negative drug screen. Recently lost custody of her two children. On arrival, pt says she is depressed with S/I with a plan to cut herself, and that she does not want to act on this plan. Denies H/I. Denies AV hallucinations but said \"The voice will talk for me. \" Calm, cooperative with admission process. Says she plans to move in with her girlfriend after discharge instead of continuing to live with her aunt. Admission done by Zulay Solorio RN. Skin check done by Zulay Solorio RN and [facility], no abnormalities found, multiple tattoos.

## 2019-12-22 NOTE — BH NOTES
Patient verbally escalating in DR at dinner. Nursing staff observed patient responding to apparent internal stimuli. Posturing and sucking thumb. Easily agitated stating Yessi Morrow are they looking at me\"? , Why is everybody looking at me?\", patient upset about dinner options and difficult to redirect. Staff attempted to verbally deescalate patient without success, patient offered PO PRN medications and refused, patient request that staff make a deal and she would take medication. Told one staff to mind there own business and not interrupt her. Nursing staff escorted patient to the acute side of the unit to a new room assignment that is more appropriate to patient situation. Administered  Haldol 5mg IM. Ativan 1mg IM and Benadryl 50 mg IM. Patient was cooperative. Safety will be monitored per unit policy.

## 2019-12-22 NOTE — BH NOTES
1900 Report received from Josy Alonzo RN. Patient in dayroom watching TV.    2015  Patient requesting evening medications. Informed patient I will bring them to her shortly. 2114  Patient compliant with evening medications. Patient denies SI/HI at this time. 2329  Patient currently in bed sleeping. 5229  Patient refused labs this morning.     1627  Patient slept throughout night    Sleep hours: 9.5

## 2019-12-22 NOTE — BH NOTES
Behavioral Health Interdisciplinary Rounds     Patient Name: Heron Chen  Age: 30 y.o. Room/Bed:  [facility]/  Primary Diagnosis: <principal problem not specified>   Admission Status: Voluntary     Readmission within 30 days: no  Power of  in place: no  Patient requires a blocked bed: no            Reason for blocked bed:   Order for blocked bed obtained: no       Sleep hours: 9.50       Participation in Care/Groups:  yes  Medication Compliant?: Yes  PRNS (last 24 hours):  Antipsychotic (PO)    Restraints (last 24 hours):  no  Substance Abuse:  no    24 hour chart check complete: no

## 2019-12-23 PROBLEM — R45.851 SUICIDAL IDEATION: Status: ACTIVE | Noted: 2019-12-23

## 2019-12-23 PROBLEM — F25.1 SCHIZOAFFECTIVE DISORDER, DEPRESSIVE TYPE (HCC): Status: ACTIVE | Noted: 2019-12-23

## 2019-12-23 PROBLEM — F31.4 BIPOLAR I DISORDER WITH DEPRESSION, SEVERE (HCC): Status: RESOLVED | Noted: 2019-12-23 | Resolved: 2019-12-23

## 2019-12-23 PROBLEM — F31.4 BIPOLAR I DISORDER WITH DEPRESSION, SEVERE (HCC): Status: ACTIVE | Noted: 2019-12-23

## 2019-12-23 PROBLEM — R45.851 SUICIDAL IDEATION: Status: RESOLVED | Noted: 2019-12-23 | Resolved: 2019-12-23

## 2019-12-23 PROCEDURE — 74011250637 HC RX REV CODE- 250/637: Performed by: NURSE PRACTITIONER

## 2019-12-23 PROCEDURE — 65220000003 HC RM SEMIPRIVATE PSYCH

## 2019-12-23 PROCEDURE — 74011250637 HC RX REV CODE- 250/637: Performed by: PSYCHIATRY & NEUROLOGY

## 2019-12-23 RX ORDER — FLUOXETINE HYDROCHLORIDE 20 MG/1
20 CAPSULE ORAL DAILY
Status: DISCONTINUED | OUTPATIENT
Start: 2019-12-23 | End: 2019-12-30 | Stop reason: HOSPADM

## 2019-12-23 RX ORDER — QUETIAPINE FUMARATE 200 MG/1
200 TABLET, FILM COATED ORAL EVERY 12 HOURS
Status: DISCONTINUED | OUTPATIENT
Start: 2019-12-23 | End: 2019-12-26

## 2019-12-23 RX ADMIN — FLUOXETINE HYDROCHLORIDE 20 MG: 20 CAPSULE ORAL at 14:51

## 2019-12-23 RX ADMIN — TRAZODONE HYDROCHLORIDE 100 MG: 100 TABLET ORAL at 21:04

## 2019-12-23 RX ADMIN — LITHIUM CARBONATE 300 MG: 300 TABLET, FILM COATED, EXTENDED RELEASE ORAL at 09:13

## 2019-12-23 RX ADMIN — QUETIAPINE FUMARATE 200 MG: 200 TABLET ORAL at 21:03

## 2019-12-23 NOTE — PROGRESS NOTES
0700  Received report from ALTON GONZALES. Assumed care of the patient. Patient reported her mood as \"down\". Pt reports depression as 10/10 and anxiety 5/10. She denies any pain. Pt did endorse SI but stated she does not have the means while in the hospital.  Pt stated outside of the hospital she stated, \"I would cut myself. \"  Pt agreed she can be safe while in the hospital.  When pt was asked if she has thoughts of hurting anyone else, pt stated, \"of course\". Pt reported she doesn't want to hurt anyone here but wants to kill someone on the outside, pt stated, \"there is someone in particular but I'm not going to tell you who that is. \"  When asked how pt would kill them, pt stated, \"with a gun\". Asked pt if she owned a gun and pt stated, \"no, but I can get one. \"  Pt denies having AVH, denies pain. Pt reports last bm was 3 days ago. Offered pt MOM and pt stated, \"no, I'm not taking that, it's nasty. \"  Educated pt to let staff know if she does not have a bm. Pt was up in the day lozano this morning and ate breakfast.  Pt was observed watching tv while in the day lozano following breakfast.  No aggressive or threatening behaviors toward staff or peers noted or reported this morning. This writer called nutrition to notify them pt has an order for double portions after lunch when pt stated she was supposed to get double portions and did not get them. Pt's dinner was also not double portions so again called nutrition services requesting double portions be brought to unit. No behavioral issues noted or reported this shift.
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
## 2019-12-23 NOTE — PROGRESS NOTES
Problem: Depressed Mood (Adult/Pediatric)  Goal: *STG: Remains safe in hospital  Outcome: Progressing Towards Goal     Problem: Depressed Mood (Adult/Pediatric)  Goal: *STG: Complies with medication therapy  Outcome: Progressing Towards Goal     Problem: Depressed Mood (Adult/Pediatric)  Goal: *LTG: Returns to previous level of functioning and participates with after care plan  Outcome: Not Progressing Towards Goal

## 2019-12-23 NOTE — PROGRESS NOTES
Laboratory monitoring for mood stabilizer and antipsychotics:    Recommended baseline monitoring has not been completed based on this patient's current medication regimen. The following labs have been ordered to complete baseline monitoring: TSH, hemoglobin A1c     *Lipid panel completed in June (see Care Everywhere tab)     The patient is currently taking the following medication(s):   Current Facility-Administered Medications   Medication Dose Route Frequency    lithium carbonate SR (LITHOBID) tablet 300 mg  300 mg Oral BID    risperiDONE (RisperDAL) tablet 2 mg  2 mg Oral QHS    traZODone (DESYREL) tablet 100 mg  100 mg Oral QHS       Serum Drug Level(s)  LITHIUM  Lab Results   Component Value Date/Time    Lithium level 0.50 (L) 12/20/2019 02:13 AM         Height, Weight, BMI Estimation  Estimated body mass index is 30.48 kg/m² as calculated from the following:    Height as of this encounter: 166.4 cm (65.5\"). Weight as of this encounter: 84.4 kg (186 lb). Renal Function, Hepatic Function and Chemistry  Estimated Creatinine Clearance: 126.3 mL/min (by C-G formula based on SCr of 0.67 mg/dL). Lab Results   Component Value Date/Time    Sodium 134 (L) 12/20/2019 02:13 AM    Potassium 4.2 12/20/2019 02:13 AM    Chloride 104 12/20/2019 02:13 AM    CO2 24 12/20/2019 02:13 AM    Anion gap 6 12/20/2019 02:13 AM    Glucose 116 (H) 12/20/2019 02:13 AM    BUN 9 12/20/2019 02:13 AM    Creatinine 0.67 12/20/2019 02:13 AM    BUN/Creatinine ratio 13 12/20/2019 02:13 AM    GFR est AA >60 12/20/2019 02:13 AM    GFR est non-AA >60 12/20/2019 02:13 AM    Calcium 9.5 12/20/2019 02:13 AM    ALT (SGPT) 139 (H) 12/20/2019 02:13 AM    AST (SGOT) 54 (H) 12/20/2019 02:13 AM    Alk.  phosphatase 82 12/20/2019 02:13 AM    Protein, total 8.1 12/20/2019 02:13 AM    Albumin 3.7 12/20/2019 02:13 AM    Globulin 4.4 (H) 12/20/2019 02:13 AM    A-G Ratio 0.8 12/20/2019 02:13 AM    Bilirubin, total 0.2 12/20/2019 02:13 AM       Lab Results Component Value Date/Time    Glucose 116 (H) 12/20/2019 02:13 AM       Hematology  Lab Results   Component Value Date/Time    WBC 8.4 12/20/2019 02:13 AM    HGB 10.7 (L) 12/20/2019 02:13 AM    HCT 34.0 (L) 12/20/2019 02:13 AM    PLATELET 927 92/91/2293 02:13 AM    MCV 88.8 12/20/2019 02:13 AM       Vitals  Visit Vitals  BP 93/40 (BP 1 Location: Right arm, BP Patient Position: Lying left side)   Pulse 60   Temp 98.1 °F (36.7 °C)   Resp 18   Ht 166.4 cm (65.5\")   Wt 84.4 kg (186 lb)   SpO2 100%   Breastfeeding No   BMI 30.48 kg/m²       Pregnancy Test  Lab Results   Component Value Date/Time    HCG urine, QL NEGATIVE  12/20/2019 02:13 AM       Jay Kramer, PharmD, BCPS  [phone] (pharmacy) [phone]

## 2019-12-23 NOTE — BH NOTES
1815 - RN Maribel Whitney and ALTON Mariano assisted assigned RN with Jhony Mcqueen by administering IM medications.

## 2019-12-23 NOTE — PROGRESS NOTES
Pt took her medication this am, but stated she didn't want to initially because she wanted to be put on the medication she was on before. Encouraged pt to take her med and then speak to the doctor about changing; pt agreed to take her medication.

## 2019-12-23 NOTE — BH NOTES
1930-Patient report received from Abraham Guillen RN    2000-Patient denies SI/HI and AVH. Patient is responding to internal stimuli. Patient stated she had a bad day earlier because \"the staff\" kept telling her she was crazy and hearing things. Patient stated she hears voices but she was acting fine. 2100-Patient was calm and cooperative and med compliant. 2233-Patient sleeping in her room.      0601-Patient slept 8.5 hours
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
## 2019-12-24 PROCEDURE — 74011250637 HC RX REV CODE- 250/637: Performed by: PSYCHIATRY & NEUROLOGY

## 2019-12-24 PROCEDURE — 65220000003 HC RM SEMIPRIVATE PSYCH

## 2019-12-24 RX ADMIN — QUETIAPINE FUMARATE 200 MG: 200 TABLET ORAL at 08:44

## 2019-12-24 RX ADMIN — FLUOXETINE HYDROCHLORIDE 20 MG: 20 CAPSULE ORAL at 08:44

## 2019-12-24 RX ADMIN — QUETIAPINE FUMARATE 200 MG: 200 TABLET ORAL at 21:03

## 2019-12-24 RX ADMIN — TRAZODONE HYDROCHLORIDE 150 MG: 100 TABLET ORAL at 21:03

## 2019-12-24 NOTE — BH NOTES
Behavioral Health Treatment Team Note     Patient goal(s) for today:  Continue taking medication as prescribed. Treatment team focus/goals: Continue stabilizing on medications. Progress note: Pt sates that she does not feel safe int he hospital but reported to nursing staff that she will most likely self harm if discharged. Has not been able to contact aunt. LOS:  3  Expected LOS: TBD    Insurance info/prescription coverage:  Medicaid  Date of last family contact:  None - No SHAMAR  Family requesting physician contact today:  N/A  Discharge plan:  Return to aunts vs Fabby Coburn in the home:  None reported   Outpatient provider(s):  [facility]    Participating treatment team members: Erica Moody, Dr. Alla Almeida and Dr. Ila Hernandez.

## 2019-12-24 NOTE — PROGRESS NOTES
Problem: Depressed Mood (Adult/Pediatric)  Goal: *STG: Remains safe in hospital  Outcome: Progressing Towards Goal     Problem: Depressed Mood (Adult/Pediatric)  Goal: *STG: Complies with medication therapy  Outcome: Progressing Towards Goal     Problem: Psychosis  Goal: *STG: Remains safe in hospital  Outcome: Progressing Towards Goal     Problem: Psychosis  Goal: *STG: Patient will develop strategies to regulate emotions and corresponding behaviors  Outcome: Not Progressing Towards Goal     Problem: Psychosis  Goal: *STG/LTG: Demonstrates improved thought patterns as evidenced by logical and coherent speech  Outcome: Not Progressing Towards Goal

## 2019-12-24 NOTE — BH NOTES
PSYCHIATRIC PROGRESS NOTE         Patient Name  Dimple Rodriguez   Date of Birth 1990   [facility] [id number]   Medical Record Number  [id number]      Age  30 y.o. PCP None   Admit date:  12/21/2019    Room Number  888/94  @ [facility]   Date of Service  12/24/2019         E & M PROGRESS NOTE:         HISTORY       CC:  \"suicidal ideation\"  HISTORY OF PRESENT ILLNESS/INTERVAL HISTORY:  (reviewed/updated 12/24/2019). per initial evaluation: Rolm Smoker \"Q\" Ba Drake is a 30year old female with a history of schizoaffective disorder admitted to [facility] for increased depression and suicidal ideation. She also reports AH and \"people who speak for her. \" She was recently discharged from the psychiatric unit at [facility]. She does not think the the medications given in her most recent hospitalization have been helpful, risperidone and lithium. She would like to take seroquel again, she reports an improvement in sleep with seroquel but reports that \"people speaking for her\" will never go away. She does have multiple social stressors such as losing custody of her children due to neglect.      12/23 - patient received Haldol Benadryl and Ativan PRN for threatening staff, menacing and gross psychosis. She continues to endorse AH and states that her recent change to Seroquel was not helpful for her as the voices did not go away but her depression returned. Patient requests going back on Seroquel. She is discharge focused but spontaneously states she will likely be killed once discharged. 12/24 - patient got PRN Benadryl Haldol and Ativan for agitation and psychosis, she is compliant with Seroquel. She states that her usual dose is 50 mg QDAY and 400 mg QHS. She reports that she is not \"safe\" in the hospital, focused on discharge but also acknowledges that she cannot locate her family in [place] to go there instead.  Patient is tolerating medication; told staff she would likely cut herself when discharged. Refused labs this AM.        SIDE EFFECTS: (reviewed/updated 12/24/2019)  Sedation on lithium     ALLERGIES:(reviewed/updated 12/24/2019)  No Known Allergies   MEDICATIONS PRIOR TO ADMISSION:(reviewed/updated 12/24/2019)  Medications Prior to Admission   Medication Sig    lithium carbonate SR (LITHOBID) 300 mg CR tablet Take 300 mg by mouth two (2) times a day.  risperiDONE (RISPERDAL) 2 mg tablet Take 2 mg by mouth nightly.  traZODone (DESYREL) 100 mg tablet Take 100 mg by mouth nightly. PAST MEDICAL HISTORY: Past medical history from the initial psychiatric evaluation has been reviewed (reviewed/updated 12/24/2019) with no additional updates (I asked patient and no additional past medical history provided). Past Medical History:   Diagnosis Date    Schizoaffective disorder ([address]    No past surgical history on file. SOCIAL HISTORY: Social history from the initial psychiatric evaluation has been reviewed (reviewed/updated 12/24/2019) with no additional updates (I asked patient and no additional social history provided).    Social History     Socioeconomic History    Marital status: SINGLE     Spouse name: Not on file    Number of children: Not on file    Years of education: Not on file    Highest education level: Not on file   Occupational History    Not on file   Social Needs    Financial resource strain: Not on file    Food insecurity:     Worry: Not on file     Inability: Not on file    Transportation needs:     Medical: Not on file     Non-medical: Not on file   Tobacco Use    Smoking status: Never Smoker    Smokeless tobacco: Never Used   Substance and Sexual Activity    Alcohol use: Not Currently    Drug use: Never    Sexual activity: Not on file   Lifestyle    Physical activity:     Days per week: Not on file     Minutes per session: Not on file    Stress: Not on file   Relationships    Social connections:     Talks on phone: Not on file Gets together: Not on file     Attends Mu-ism service: Not on file     Active member of club or organization: Not on file     Attends meetings of clubs or organizations: Not on file     Relationship status: Not on file    Intimate partner violence:     Fear of current or ex partner: Not on file     Emotionally abused: Not on file     Physically abused: Not on file     Forced sexual activity: Not on file   Other Topics Concern    Not on file   Social History Narrative    Not on file      FAMILY HISTORY: Family history from the initial psychiatric evaluation has been reviewed (reviewed/updated 12/24/2019) with no additional updates (I asked patient and no additional family history provided). No family history on file. REVIEW OF SYSTEMS: (reviewed/updated 12/24/2019)  Appetite:no change from normal   Sleep: poor with DIMS (difficulty initiating & maintaining sleep)   All other Review of Systems: Negative except per HPI         [facility] (MSE):    MSE FINDINGS ARE WITHIN NORMAL LIMITS (WNL) UNLESS OTHERWISE STATED BELOW. ( ALL OF THE BELOW CATEGORIES OF THE MSE HAVE BEEN REVIEWED (reviewed 12/24/2019) AND UPDATED AS DEEMED APPROPRIATE )  General Presentation age appropriate, passive and vague   Orientation not oriented to situation   Vital Signs  See below (reviewed 12/24/2019); Vital Signs (BP, Pulse, & Temp) are within normal limits if not listed below.    Gait and Station Stable/steady, no ataxia   Musculoskeletal System No extrapyramidal symptoms (EPS); no abnormal muscular movements or Tardive Dyskinesia (TD); muscle strength and tone are within normal limits   Language No aphasia or dysarthria   Speech:  normal volume and non-pressured   Thought Processes illogical; normal rate of thoughts; poor abstract reasoning/computation   Thought Associations circumstantial   Thought Content paranoid delusions and internally preoccupied   Suicidal Ideations contracts for safety   Homicidal Ideations none   Mood:  depressed   Affect:  full range and mood-congruent   Memory recent  intact   Memory remote:  intact   Concentration/Attention:  intact   Fund of Knowledge average   Insight:  limited   Reliability fair   Judgment:  poor          VITALS:     Patient Vitals for the past 24 hrs:   Temp Pulse Resp BP SpO2   12/24/19 0800 98.2 °F (36.8 °C) 68 18 98/48 98 %   12/23/19 1940 98.2 °F (36.8 °C) 70 18 112/57 100 %     Wt Readings from Last 3 Encounters:   12/21/19 84.4 kg (186 lb)   12/20/19 85.3 kg (188 lb)   03/04/14 76.7 kg (169 lb)     Temp Readings from Last 3 Encounters:   12/24/19 98.2 °F (36.8 °C)   12/21/19 97.1 °F (36.2 °C)   12/04/19 98.4 °F (36.9 °C)     BP Readings from Last 3 Encounters:   12/24/19 98/48   12/21/19 114/67   12/04/19 117/75     Pulse Readings from Last 3 Encounters:   12/24/19 68   12/21/19 76   12/04/19 68            DATA     LABORATORY DATA:(reviewed/updated 12/24/2019)  No results found for this or any previous visit (from the past 24 hour(s)). No results found for: VALF2, VALAC, VALP, VALPR, DS6, CRBAM, CRBAMP, CARB2, XCRBAM  Lab Results   Component Value Date/Time    Lithium level 0.50 (L) 12/20/2019 02:13 AM      RADIOLOGY REPORTS:(reviewed/updated 12/24/2019)  Xr Chest Port    Result Date: 12/4/2019  AP CHEST, PORTABLE INDICATION: Chest pain COMPARISON: None. FINDINGS:  EKG leads overlie the patient. The cardiac silhouette is top normal in size. The pulmonary vasculature is unremarkable. No focal consolidation, pleural effusion, or pneumothorax. No acute osseous abnormalities are identified. Impression:  No acute finding.           MEDICATIONS     ALL MEDICATIONS:   Current Facility-Administered Medications   Medication Dose Route Frequency    QUEtiapine (SEROquel) tablet 200 mg  200 mg Oral Q12H    FLUoxetine (PROzac) capsule 20 mg  20 mg Oral DAILY    traZODone (DESYREL) tablet 100 mg  100 mg Oral QHS    OLANZapine (ZyPREXA) tablet 5 mg 5 mg Oral Q6H PRN    haloperidol lactate (HALDOL) injection 5 mg  5 mg IntraMUSCular Q6H PRN    benztropine (COGENTIN) tablet 1 mg  1 mg Oral BID PRN    diphenhydrAMINE (BENADRYL) injection 50 mg  50 mg IntraMUSCular BID PRN    hydrOXYzine HCl (ATARAX) tablet 50 mg  50 mg Oral TID PRN    LORazepam (ATIVAN) injection 1 mg  1 mg IntraMUSCular Q4H PRN    traZODone (DESYREL) tablet 50 mg  50 mg Oral QHS PRN    acetaminophen (TYLENOL) tablet 650 mg  650 mg Oral Q4H PRN    magnesium hydroxide (MILK OF MAGNESIA) 400 mg/5 mL oral suspension 30 mL  30 mL Oral DAILY PRN      SCHEDULED MEDICATIONS:   Current Facility-Administered Medications   Medication Dose Route Frequency    QUEtiapine (SEROquel) tablet 200 mg  200 mg Oral Q12H    FLUoxetine (PROzac) capsule 20 mg  20 mg Oral DAILY    traZODone (DESYREL) tablet 100 mg  100 mg Oral QHS          ASSESSMENT & PLAN     DIAGNOSES REQUIRING ACTIVE TREATMENT AND MONITORING: (reviewed/updated 12/24/2019)  Patient Active Hospital Problem List:   [facility] (12/23/2019)    Assessment: patient with recent decompensation in her thought process following a recent medication change. Per patient, she has not had any attenuation of symptoms since starting new agents. Plan:  - CONTINUE Prozac 20 mg QDAY for depression  - CONTINUE Seroquel 200 mg Q12H for psychosis  - INCREASE Trazodone to 150 mg QHS for insomnia    In summary, Noni Carlson, is a 30 y.o.  female who presents with a severe exacerbation of the principal diagnosis of Schizoaffective disorder, depressive type ([facility]    Patient's condition is not improving. Patient requires continued inpatient hospitalization for further stabilization, safety monitoring and medication management.   I will continue to coordinate the provision of individual, milieu, occupational, group, and substance abuse therapies to address target symptoms/diagnoses as deemed appropriate for the individual patient. A coordinated, multidisplinary treatment team round was conducted with the patient (this team consists of the nurse, psychiatric unit pharmcist,  and writer). Complete current electronic health record for patient has been reviewed today including consultant notes, ancillary staff notes, nurses and psychiatric tech notes. Suicide risk assessment completed and patient deemed to be of low risk for suicide at this time. The following regarding medications was addressed during rounds with patient:   the risks and benefits of the proposed medication. The patient was given the opportunity to ask questions. Informed consent given to the use of the above medications. Will continue to adjust psychiatric and non-psychiatric medications (see above \"medication\" section and orders section for details) as deemed appropriate & based upon diagnoses and response to treatment. I will continue to order blood tests/labs and diagnostic tests as deemed appropriate and review results as they become available (see orders for details and above listed lab/test results). I will order psychiatric records from previous [facility] hospitals to further elucidate the nature of patient's psychopathology and review once available. I will gather additional collateral information from friends, family and o/p treatment team to further elucidate the nature of patient's psychopathology and baselline level of psychiatric functioning. I certify that this patient's inpatient psychiatric hospital services furnished since the previous certification were, and continue to be, required for treatment that could reasonably be expected to improve the patient's condition, or for diagnostic study, and that the patient continues to need, on a daily basis, active treatment furnished directly by or requiring the supervision of inpatient psychiatric facility personnel.  In addition, the hospital records show that services furnished were intensive treatment services, admission or related services, or equivalent services.     EXPECTED DISCHARGE DATE/DAY: TBD     DISPOSITION: Home       Signed By:   Gill Espinal MD  12/24/2019

## 2019-12-24 NOTE — GROUP NOTE
IP  GROUP DOCUMENTATION INDIVIDUAL                                                                          Group Therapy Note    Date: 12/24/2019    Group Start Time: 1500  Group End Time: 1600  Group Topic: Nursing    [address] ACUTE [facility]    Jazmyne CUNNINGHAM    IP [address] GROUP DOCUMENTATION GROUP    Group Therapy Note    Attendees: 8         Attendance: Did not attend        Robert Marquez

## 2019-12-24 NOTE — GROUP NOTE
HORTENCIA  GROUP DOCUMENTATION INDIVIDUAL                                                                          Group Therapy Note    Date: 12/24/2019    Group Start Time: 1500  Group End Time: 1600  Group Topic: Nursing    [address] [facility] ACUTE BE[facility]AV [facility]    Chad CUNNINGHAM    IP [facility] GROUP DOCUMENTATION GROUP    Group Therapy Note    Attendees:8         Attendance: Did not attend      Adrian Deluna

## 2019-12-24 NOTE — BH NOTES
Assumed care for the patient. Patient was out in the day room, watching TV. Patient appeared calm and pleasant. Patient was observed lying  in the fetal position sucking her thumb. Patient was cooperative with the  assessment. Patient admitted having S/H/I . Patient when asked  if she has a plan, stated that she would steal medicines from the medicine room. Also when asked if she would hurt anyone, patient stated that she just want to kill them all. Patient did not tell who \"They\" were. Patient denied VH but admitted to hearing voices. Patient stated that the voices tell everything, when asked what do the voices tell. Patient stated that she did not have BM since 3 days but at the same  refused to have medicine for it. Patient was medicine and meal compliant. Patient went to bed after she had her medicine. Will continue to monitor. 12/24/2019 Patient refused labs this morning. Patient non acceptance to education. Patient slept for about 8 hours.

## 2019-12-24 NOTE — PROGRESS NOTES
0700  Received report from ALTON KYLE. Assumed care of the patient. Pt was up in the day lozano this morning for breakfast and then went back to room to bed following breakfast.  Pt eyes closed during assessment-pt denies SI/HI/AVH. Pt reports no depression or anxiety at this time. Pt denies pain. Pt came into the day lozano for lunch. Pt in day lozano for afternoon snack but left after-she refused to attend group. Pt up this evening in the day lozano and ate dinner. Pt was upset because she did not received double portions. Staff went down to the cafeteria and ordered pt another tray-double portion order put in again due to nutrition not seeing the order for double portions currently. Pt went back to her room following dinner-isolative to room most of this shift. At [phone], pt allowed to shave with staff observation. Pt contracted for safety prior to shaving. This nurse educated pt if she tried to cut herself inappropriately with the razor, pt would not be allowed to shave again for duration of stay. Pt verbalized understanding and was monitored for safety during shower. Pt gave razor back to this writer after she was finished and her skin was checked-no cuts noted.

## 2019-12-24 NOTE — BH NOTES
[facility] Interdisciplinary Rounds     Patient Name: Suraj Beltran  Age: 30 y.o.   Room/Bed:  312/02  Primary Diagnosis: Schizoaffective disorder, depressive type ([facility]   Admission Status: Vol     Readmission within 30 days:  Power of  in place:   Patient requires a blocked bed :     Reason for blocked bed:     Sleep hours:  About 8 hours     Participation in Care/Groups:  Medication Compliant?: Yes  PRNS (last 24 hours): No    Restraints (last 24 hours):No  Substance Abuse: Yes  24 hour chart check complete:      Patient goal(s) for today:   Treatment team focus/goals:   Progress note:     LOS:  3  Expected LOS:     Financial concerns/prescription coverage:    Family contact:      Family requesting physician contact today:   Discharge plan:   Access to weapons:        Outpatient provider(s):  Patient's preferred phone number for follow up call:     Participating treatment team members: Suraj Beltran, (assigned SW),

## 2019-12-25 PROCEDURE — 74011250637 HC RX REV CODE- 250/637: Performed by: PSYCHIATRY & NEUROLOGY

## 2019-12-25 PROCEDURE — 65220000003 HC RM SEMIPRIVATE PSYCH

## 2019-12-25 RX ADMIN — QUETIAPINE FUMARATE 200 MG: 200 TABLET ORAL at 08:54

## 2019-12-25 RX ADMIN — FLUOXETINE HYDROCHLORIDE 20 MG: 20 CAPSULE ORAL at 08:54

## 2019-12-25 RX ADMIN — TRAZODONE HYDROCHLORIDE 150 MG: 100 TABLET ORAL at 21:37

## 2019-12-25 RX ADMIN — QUETIAPINE FUMARATE 200 MG: 200 TABLET ORAL at 21:36

## 2019-12-25 NOTE — BH NOTES
0700-  0800-1000-Patient in no acute distress at this time however she did not want  Nurse Sheri Cancer to give her medication so I assumed care of this patient and  gave he medication. She denies pain or discomfort , denies Si/HI  Appetite is good  At this time. She is compliant with scheduled medication. 1000-1200-Patient in no acute distress at this time , appetite is good, she was seen by the MD who asked to move her to general at this time. Patient agreed with the move at this time. 1230-Patient was moved over to General side to room 321 at this time. 1230-1400-Patient in no acute distress at this time she is adjusting well without any complaint or concerns at this time  1400-1600-Patient in no acute distress at  This time in dayroom interacting in a positive manner at this time. .  [id number]- Patient in no acute distress she had verbal outburst while on the general side but she was talked to and  Was able to maintain emotional control.

## 2019-12-25 NOTE — PROGRESS NOTES
Problem: Depressed Mood (Adult/Pediatric)  Goal: *STG: Participates in treatment plan  Outcome: Progressing Towards Goal     Problem: Psychosis  Goal: *STG/LTG: Demonstrates improved thought patterns as evidenced by logical and coherent speech  Outcome: Progressing Towards Goal     Problem: Suicide  Goal: *STG: Remains safe in hospital  Outcome: Progressing Towards Goal     Problem: Suicide  Goal: *STG/LTG: Complies with medication therapy  Outcome: Progressing Towards Goal

## 2019-12-25 NOTE — BH NOTES
[facility] Interdisciplinary Rounds     Patient Name: Alejandro Singer  Age: 30 y.o. Room/Bed:  312/02  Primary Diagnosis: Schizoaffective disorder, depressive type [facility]   Admission Status: (V)     Readmission within 30 days:   Power of  in place:   Patient requires a blocked bed:    Reason for blocked bed:     Sleep hours:About 6 hours      Participation in Care/Group: No  Medication Compliant?: Yes  PRNS (last 24 hours): No    Restraints (last 24 hours): No  Substance Abuse: Yes   24 hour chart check complete:     Patient goal(s) for today: Continue to take medications as prescribed. Treatment team focus/goals:  Continue stabilizing on medications. Progress note:  Pt remains paranoid and irritable, requesting to transfer to a different hospital.     LOS:  3  Expected LOS: January 2    Financial concerns/prescription coverage: Medication   Family contact:  No release signed    Family requesting physician contact today:  N/A  Discharge plan: Aunt's home. Access to weapons:  None reported. Outpatient provider(s): [facility]  Patient's preferred phone number for follow up call: Unknown at this time. Participating treatment team members: Alejandro Singer, JOSÉ MIGUEL Pina and Dr. Michael Sharif.
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
## 2019-12-25 NOTE — PROGRESS NOTES
Problem: Depressed Mood (Adult/Pediatric)  Goal: *STG: Verbalizes anger, guilt, and other feelings in a constructive manor  Outcome: Progressing Towards Goal  Goal: *STG: Demonstrates reduction in symptoms and increase in insight into coping skills/future focused  Outcome: Progressing Towards Goal  Goal: *STG: Remains safe in hospital  Outcome: Progressing Towards Goal

## 2019-12-25 NOTE — PROGRESS NOTES
Diet as tolerated. Pt meal compliant; does not need double portions to meet ~ needs.   Ht: 5'5.5\"  Wt: 186 lb  BMI: 30.48 kg/(m^@) c/w obesity grade I  Est energy needs: 1785 kcal 70 g protein, 2000 mL fluids  Pt will consume > 75% of meals at follow up 7-10 days  MST LOS

## 2019-12-26 PROCEDURE — 65220000003 HC RM SEMIPRIVATE PSYCH

## 2019-12-26 PROCEDURE — 74011250637 HC RX REV CODE- 250/637: Performed by: PSYCHIATRY & NEUROLOGY

## 2019-12-26 RX ADMIN — TRAZODONE HYDROCHLORIDE 150 MG: 100 TABLET ORAL at 20:38

## 2019-12-26 RX ADMIN — FLUOXETINE HYDROCHLORIDE 20 MG: 20 CAPSULE ORAL at 08:28

## 2019-12-26 RX ADMIN — QUETIAPINE FUMARATE 200 MG: 200 TABLET ORAL at 08:28

## 2019-12-26 RX ADMIN — QUETIAPINE FUMARATE 300 MG: 200 TABLET ORAL at 20:39

## 2019-12-26 NOTE — PROGRESS NOTES
Problem: Depressed Mood (Adult/Pediatric)  Goal: *STG: Complies with medication therapy  Outcome: Progressing Towards Goal     Problem: Depressed Mood (Adult/Pediatric)  Goal: *STG: Demonstrates reduction in symptoms and increase in insight into coping skills/future focused  Outcome: Not Progressing Towards Goal  Variance Patient slowly responding

## 2019-12-26 NOTE — BH NOTES
1900 - Report received from Nahid Carpenter, [facility]. Patient up in day room interacting with staff and peers. Patient is pleasant during assessment. Continues to be suspicious of certain staff. She denies SI/HI and A/VH. She voices no complaints of pain or discomfort. Will continue to monitor for safety per unit policy. 1510 - Patient requests that labs be drawn later when she gets up. Patient slept approximately 8 hours this shift.

## 2019-12-26 NOTE — GROUP NOTE
IP  GROUP DOCUMENTATION INDIVIDUAL                                                                          Group Therapy Note    Date: 12/26/2019    Group Start Time: 1600  Group End Time: 1630  Group Topic: Nursing    [facility] ACUTE BEHAV HLTH    Michelle Rene RN    IP [address] GROUP DOCUMENTATION GROUP    Group Therapy Note    Attendees: 6         Attendance: Did not attend      Conchis Doty RN

## 2019-12-26 NOTE — BH NOTES
Patient alert and verbal. Denies SI/HI. Observed responding to internal stimuli. Patient labile and paranoid. Overheard cursing and making threatening statements about killing staff today. MD informed. Discussed with patient appropriate behavior on milieu. Per MD, nursing may move patient back to acute side if behavior continues. No further incidents noted after talking with patient. Medication and meal compliant. Visible on unit. Q 15 minute checks continue for safety.
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
## 2019-12-26 NOTE — BH NOTES
Behavioral Health Interdisciplinary Rounds     Patient Name: Elayne Rater  Age: 30 y.o.   Room/Bed:  [facility]/  Primary Diagnosis: Schizoaffective disorder, depressive type ([facility]   Admission Status: Voluntary     Readmission within 30 days: no  Power of  in place: no  Patient requires a blocked bed: no            Reason for blocked bed:   Order for blocked bed obtained: no       Sleep hours: 8       Participation in Care/Groups:  no  Medication Compliant?: Yes  PRNS (last 24 hours): None    Restraints (last 24 hours):  no  Substance Abuse:  no    24 hour chart check complete: no

## 2019-12-27 PROCEDURE — 65220000003 HC RM SEMIPRIVATE PSYCH

## 2019-12-27 PROCEDURE — 74011250637 HC RX REV CODE- 250/637: Performed by: PSYCHIATRY & NEUROLOGY

## 2019-12-27 RX ADMIN — TRAZODONE HYDROCHLORIDE 150 MG: 100 TABLET ORAL at 21:07

## 2019-12-27 RX ADMIN — QUETIAPINE FUMARATE 300 MG: 200 TABLET ORAL at 21:07

## 2019-12-27 RX ADMIN — QUETIAPINE FUMARATE 300 MG: 200 TABLET ORAL at 08:25

## 2019-12-27 RX ADMIN — FLUOXETINE HYDROCHLORIDE 20 MG: 20 CAPSULE ORAL at 08:25

## 2019-12-27 NOTE — BH NOTES
GROUP THERAPY PROGRESS NOTE    Cheryl Johnson did not participate in [facility] group.      Matt Figueroa MA

## 2019-12-27 NOTE — BH NOTES
Patient without complaint this shift. She is paranoid about taking her medication stating \"I think that nurse trying to kill me\" but was medication compliant. Observed in bed resting quietly with eyes closed. Slept approx 8 hours.

## 2019-12-27 NOTE — PROGRESS NOTES
Problem: Depressed Mood (Adult/Pediatric)  Goal: *STG: Remains safe in hospital  Outcome: Progressing Towards Goal

## 2019-12-28 PROCEDURE — 65220000003 HC RM SEMIPRIVATE PSYCH

## 2019-12-28 PROCEDURE — 74011250637 HC RX REV CODE- 250/637: Performed by: PSYCHIATRY & NEUROLOGY

## 2019-12-28 PROCEDURE — 74011250637 HC RX REV CODE- 250/637: Performed by: NURSE PRACTITIONER

## 2019-12-28 RX ADMIN — QUETIAPINE FUMARATE 300 MG: 200 TABLET ORAL at 08:09

## 2019-12-28 RX ADMIN — QUETIAPINE FUMARATE 300 MG: 200 TABLET ORAL at 21:08

## 2019-12-28 RX ADMIN — MAGNESIUM HYDROXIDE 30 ML: 400 SUSPENSION ORAL at 19:39

## 2019-12-28 RX ADMIN — TRAZODONE HYDROCHLORIDE 150 MG: 100 TABLET ORAL at 21:08

## 2019-12-28 RX ADMIN — FLUOXETINE HYDROCHLORIDE 20 MG: 20 CAPSULE ORAL at 08:09

## 2019-12-28 NOTE — BH NOTES
Assumed care of patient after morning report. Patient isolated to room. Minimal response to assessment questions. Stated \"I don't want to talk\". Patient has no interaction with peers or staff. Medication compliant eating and sleeping well,  Patient will be monitored for safety per unit policy.

## 2019-12-28 NOTE — BH NOTES
PSYCHIATRIC PROGRESS NOTE         Patient Name  Sukumar Villela   Date of Birth 1990   [facility] [id number]   Medical Record Number  [id number]      Age  30 y.o. PCP None   Admit date:  12/21/2019    Room Number  321/01  @ [facility]   Date of Service  12/28/2019         E & M PROGRESS NOTE:         HISTORY       CC:  \"suicidal ideation\"  HISTORY OF PRESENT ILLNESS/INTERVAL HISTORY:  (reviewed/updated 12/28/2019). per initial evaluation: Verndale Salts \"Q\" Jennifer Scott is a 30year old female with a history of schizoaffective disorder admitted to [facility] for increased depression and suicidal ideation. She also reports AH and \"people who speak for her. \" She was recently discharged from the psychiatric unit at [facility]. She does not think the the medications given in her most recent hospitalization have been helpful, risperidone and lithium. She would like to take seroquel again, she reports an improvement in sleep with seroquel but reports that \"people speaking for her\" will never go away. She does have multiple social stressors such as losing custody of her children due to neglect.      12/23 - patient received Haldol Benadryl and Ativan PRN for threatening staff, menacing and gross psychosis. She continues to endorse AH and states that her recent change to Seroquel was not helpful for her as the voices did not go away but her depression returned. Patient requests going back on Seroquel. She is discharge focused but spontaneously states she will likely be killed once discharged. 12/24 - patient got PRN Benadryl Haldol and Ativan for agitation and psychosis, she is compliant with Seroquel. She states that her usual dose is 50 mg QDAY and 400 mg QHS. She reports that she is not \"safe\" in the hospital, focused on discharge but also acknowledges that she cannot locate her family in [place] to go there instead.  Patient is tolerating medication; told staff she would likely cut herself when discharged. Refused labs this AM.    12/25 - no acute overnight events. Patient blunted, non-spontaneous speech; refused labwork again. Patient medication compliant, no PRNs given. Slept 7 hours. Patient discharge focused, unable to contact her family in [place] and still grossly internally preoccupied. She has not given treatment team the authority to reach out to anyone on her behalf.    12/26 patient labile, irritable, per nursing she has been making homicidal threats and may need to return to more acute side of the unit. Patient refused labs this AM, no significant change in her thought process, and denies SI/HI. Patient compliant with Seroquel, still grossly internally preoccupied, blunted. 12/27 - patient remains irritable, odd, with occasional outbursts toward peers and staff. She is medication compliant, and is generally calm on interview. She is internally preoccupied and appears to be responding to internally stimuli, which she denies. Unclear if this is a baseline behavior as no collateral has been found. 12/28/19  Ms. Gary Gimenez remains mildly irritable although she has been isolative to her room all day. She was sleeping in bed and stated she did not want to talk to me. Little interaction was possible with her. SIDE EFFECTS: (reviewed/updated 12/28/2019)  Sedation on lithium     ALLERGIES:(reviewed/updated 12/28/2019)  No Known Allergies   MEDICATIONS PRIOR TO ADMISSION:(reviewed/updated 12/28/2019)  Medications Prior to Admission   Medication Sig    lithium carbonate SR (LITHOBID) 300 mg CR tablet Take 300 mg by mouth two (2) times a day.  risperiDONE (RISPERDAL) 2 mg tablet Take 2 mg by mouth nightly.  traZODone (DESYREL) 100 mg tablet Take 100 mg by mouth nightly.       PAST MEDICAL HISTORY: Past medical history from the initial psychiatric evaluation has been reviewed (reviewed/updated 12/28/2019) with no additional updates (I asked patient and no additional past medical history provided). Past Medical History:   Diagnosis Date    Schizoaffective disorder ([facility] [address]    No past surgical history on file. SOCIAL HISTORY: Social history from the initial psychiatric evaluation has been reviewed (reviewed/updated 12/28/2019) with no additional updates (I asked patient and no additional social history provided). Social History     Socioeconomic History    Marital status: SINGLE     Spouse name: Not on file    Number of children: Not on file    Years of education: Not on file    Highest education level: Not on file   Occupational History    Not on file   Social Needs    Financial resource strain: Not on file    Food insecurity:     Worry: Not on file     Inability: Not on file    Transportation needs:     Medical: Not on file     Non-medical: Not on file   Tobacco Use    Smoking status: Never Smoker    Smokeless tobacco: Never Used   Substance and Sexual Activity    Alcohol use: Not Currently    Drug use: Never    Sexual activity: Not on file   Lifestyle    Physical activity:     Days per week: Not on file     Minutes per session: Not on file    Stress: Not on file   Relationships    Social connections:     Talks on phone: Not on file     Gets together: Not on file     Attends Shinto service: Not on file     Active member of club or organization: Not on file     Attends meetings of clubs or organizations: Not on file     Relationship status: Not on file    Intimate partner violence:     Fear of current or ex partner: Not on file     Emotionally abused: Not on file     Physically abused: Not on file     Forced sexual activity: Not on file   Other Topics Concern    Not on file   Social History Narrative    Not on file      FAMILY HISTORY: Family history from the initial psychiatric evaluation has been reviewed (reviewed/updated 12/28/2019) with no additional updates (I asked patient and no additional family history provided). No family history on file. REVIEW OF SYSTEMS: (reviewed/updated 12/28/2019)  Appetite:no change from normal   Sleep: poor with DIMS (difficulty initiating & maintaining sleep)   All other Review of Systems: Negative except per HPI         [facility] (MSE):    MSE FINDINGS ARE WITHIN NORMAL LIMITS (WNL) UNLESS OTHERWISE STATED BELOW. ( ALL OF THE BELOW CATEGORIES OF THE MSE HAVE BEEN REVIEWED (reviewed 12/28/2019) AND UPDATED AS DEEMED APPROPRIATE )  General Presentation age appropriate, passive and vague   Orientation not oriented to situation   Vital Signs  See below (reviewed 12/28/2019); Vital Signs (BP, Pulse, & Temp) are within normal limits if not listed below.    Gait and Station Stable/steady, no ataxia   Musculoskeletal System No extrapyramidal symptoms (EPS); no abnormal muscular movements or Tardive Dyskinesia (TD); muscle strength and tone are within normal limits   Language No aphasia or dysarthria   Speech:  normal volume and non-pressured   Thought Processes illogical; normal rate of thoughts; poor abstract reasoning/computation   Thought Associations circumstantial   Thought Content paranoid delusions and internally preoccupied   Suicidal Ideations contracts for safety   Homicidal Ideations none   Mood:  depressed   Affect:  full range and mood-congruent   Memory recent  intact   Memory remote:  intact   Concentration/Attention:  intact   Fund of Knowledge average   Insight:  limited   Reliability fair   Judgment:  poor          VITALS:     Patient Vitals for the past 24 hrs:   Temp Pulse Resp BP SpO2   12/27/19 2013 98.2 °F (36.8 °C) 76 16 115/70 97 %   12/27/19 1833 98.2 °F (36.8 °C) 77 17 115/70 96 %     Wt Readings from Last 3 Encounters:   12/21/19 84.4 kg (186 lb)   12/20/19 85.3 kg (188 lb)   03/04/14 76.7 kg (169 lb)     Temp Readings from Last 3 Encounters:   12/27/19 98.2 °F (36.8 °C)   12/21/19 97.1 °F (36.2 °C)   12/04/19 98.4 °F (36.9 °C)     BP Readings from Last 3 Encounters:   12/27/19 115/70   12/21/19 114/67   12/04/19 117/75     Pulse Readings from Last 3 Encounters:   12/27/19 76   12/21/19 76   12/04/19 68            DATA     LABORATORY DATA:(reviewed/updated 12/28/2019)  No results found for this or any previous visit (from the past 24 hour(s)). No results found for: VALF2, VALAC, VALP, VALPR, DS6, CRBAM, CRBAMP, CARB2, XCRBAM  Lab Results   Component Value Date/Time    Lithium level 0.50 (L) 12/20/2019 02:13 AM      RADIOLOGY REPORTS:(reviewed/updated 12/28/2019)  Xr Chest Port    Result Date: 12/4/2019  AP CHEST, PORTABLE INDICATION: Chest pain COMPARISON: None. FINDINGS:  EKG leads overlie the patient. The cardiac silhouette is top normal in size. The pulmonary vasculature is unremarkable. No focal consolidation, pleural effusion, or pneumothorax. No acute osseous abnormalities are identified. Impression:  No acute finding.           MEDICATIONS     ALL MEDICATIONS:   Current Facility-Administered Medications   Medication Dose Route Frequency    QUEtiapine (SEROquel) tablet 300 mg  300 mg Oral Q12H    traZODone (DESYREL) tablet 150 mg  150 mg Oral QHS    FLUoxetine (PROzac) capsule 20 mg  20 mg Oral DAILY    OLANZapine (ZyPREXA) tablet 5 mg  5 mg Oral Q6H PRN    haloperidol lactate (HALDOL) injection 5 mg  5 mg IntraMUSCular Q6H PRN    benztropine (COGENTIN) tablet 1 mg  1 mg Oral BID PRN    diphenhydrAMINE (BENADRYL) injection 50 mg  50 mg IntraMUSCular BID PRN    hydrOXYzine HCl (ATARAX) tablet 50 mg  50 mg Oral TID PRN    LORazepam (ATIVAN) injection 1 mg  1 mg IntraMUSCular Q4H PRN    traZODone (DESYREL) tablet 50 mg  50 mg Oral QHS PRN    acetaminophen (TYLENOL) tablet 650 mg  650 mg Oral Q4H PRN    magnesium hydroxide (MILK OF MAGNESIA) 400 mg/5 mL oral suspension 30 mL  30 mL Oral DAILY PRN      SCHEDULED MEDICATIONS:   Current Facility-Administered Medications   Medication Dose Route Frequency    QUEtiapine (SEROquel) tablet 300 mg  300 mg Oral Q12H    traZODone (DESYREL) tablet 150 mg  150 mg Oral QHS    FLUoxetine (PROzac) capsule 20 mg  20 mg Oral DAILY          ASSESSMENT & PLAN     DIAGNOSES REQUIRING ACTIVE TREATMENT AND MONITORING: (reviewed/updated 12/28/2019)  Patient [address] Problem List:   Schizoaffective disorder, depressive type ([facility] [address] (12/23/2019)    Assessment: patient with recent decompensation in her thought process following a recent medication change. Per patient, she has not had any attenuation of symptoms since starting new agents. Plan:  - CONTINUE Prozac 20 mg QDAY for depression  - CONTINUE Seroquel 300 mg Q12H for psychosis  - CONTINUE Trazodone 150 mg QHS for insomnia    In summary, Jess Cortez, is a 30 y.o.  female who presents with a severe exacerbation of the principal diagnosis of Schizoaffective disorder, depressive type ([facility]    Patient's condition is not improving. Patient requires continued inpatient hospitalization for further stabilization, safety monitoring and medication management. I will continue to coordinate the provision of individual, milieu, occupational, group, and substance abuse therapies to address target symptoms/diagnoses as deemed appropriate for the individual patient. A coordinated, multidisplinary treatment team round was conducted with the patient (this team consists of the nurse, psychiatric unit pharmcist,  and writer). Complete current electronic health record for patient has been reviewed today including consultant notes, ancillary staff notes, nurses and psychiatric tech notes. Suicide risk assessment completed and patient deemed to be of low risk for suicide at this time. The following regarding medications was addressed during rounds with patient:   the risks and benefits of the proposed medication. The patient was given the opportunity to ask questions. Informed consent given to the use of the above medications.  Will continue to adjust psychiatric and non-psychiatric medications (see above \"medication\" section and orders section for details) as deemed appropriate & based upon diagnoses and response to treatment. I will continue to order blood tests/labs and diagnostic tests as deemed appropriate and review results as they become available (see orders for details and above listed lab/test results). I will order psychiatric records from previous [facility] hospitals to further elucidate the nature of patient's psychopathology and review once available. I will gather additional collateral information from friends, family and o/p treatment team to further elucidate the nature of patient's psychopathology and baselline level of psychiatric functioning. I certify that this patient's inpatient psychiatric hospital services furnished since the previous certification were, and continue to be, required for treatment that could reasonably be expected to improve the patient's condition, or for diagnostic study, and that the patient continues to need, on a daily basis, active treatment furnished directly by or requiring the supervision of inpatient psychiatric facility personnel. In addition, the hospital records show that services furnished were intensive treatment services, admission or related services, or equivalent services.     EXPECTED DISCHARGE DATE/DAY: TBD     DISPOSITION: Home       Signed By:   Robert Álvarez MD  12/28/2019

## 2019-12-28 NOTE — PROGRESS NOTES
Problem: Depressed Mood (Adult/Pediatric)  Goal: *STG: Verbalizes anger, guilt, and other feelings in a constructive manor  12/27/2019 2321 by Yvonna Osgood, RN  Outcome: Not Progressing Towards Goal  12/27/2019 2320 by Yvonna Osgood, RN  Outcome: Progressing Towards Goal     Problem: Depressed Mood (Adult/Pediatric)  Goal: *STG: Attends activities and groups  Outcome: Progressing Towards Goal     Problem: Depressed Mood (Adult/Pediatric)  Goal: *STG: Demonstrates reduction in symptoms and increase in insight into coping skills/future focused  12/27/2019 2321 by Yvonna Osgood, RN  Outcome: Not Progressing Towards Goal  12/27/2019 2320 by Yvonna Osgood, RN  Outcome: Progressing Towards Goal     Problem: Depressed Mood (Adult/Pediatric)  Goal: *STG: Remains safe in hospital  12/27/2019 2321 by Yvonna Osgood, RN  Outcome: Progressing Towards Goal  12/27/2019 2320 by Yvonna Osgood, RN  Outcome: Progressing Towards Goal     Problem: Depressed Mood (Adult/Pediatric)  Goal: *STG: Complies with medication therapy  Outcome: Progressing Towards Goal     Problem: Depressed Mood (Adult/Pediatric)  Goal: *LTG: Returns to previous level of functioning and participates with after care plan  Outcome: Progressing Towards Goal     Problem: Depressed Mood (Adult/Pediatric)  Goal: *LTG: Understands illness and can identify signs of relapse  Outcome: Progressing Towards Goal  1920- I assume care of the patient. Patient is sitting in the day room isolative, watching TV. Patient denies SI/HI. Patient denies AH and VH at this time. Patient has a flat affect and stated that she only wants me to give her medications. Patient states that she doesn't trust anyone to give her night time medications except for her nurse. Patient is calm and went back to watching TV.  2030- Patient is eating her snack. 2110- Patient had to be awaken to take her medications, patient complies with medications.   2300- Patient is asleep in bed.  0100- Patient is asleep in bed.  0300- Patient is asleep in bed.  0500- Patient is asleep in bed.  0600- Patient slept approx. 9 hrs.

## 2019-12-29 PROCEDURE — 74011250637 HC RX REV CODE- 250/637: Performed by: PSYCHIATRY & NEUROLOGY

## 2019-12-29 PROCEDURE — 65220000003 HC RM SEMIPRIVATE PSYCH

## 2019-12-29 RX ADMIN — TRAZODONE HYDROCHLORIDE 150 MG: 100 TABLET ORAL at 21:01

## 2019-12-29 RX ADMIN — FLUOXETINE HYDROCHLORIDE 20 MG: 20 CAPSULE ORAL at 08:17

## 2019-12-29 RX ADMIN — QUETIAPINE FUMARATE 300 MG: 200 TABLET ORAL at 08:16

## 2019-12-29 RX ADMIN — QUETIAPINE FUMARATE 300 MG: 200 TABLET ORAL at 21:01

## 2019-12-29 NOTE — BH NOTES
Assumed care of patient after am shift report. Patient withdrawn to room in the early part of the shift, Out in the milieu later in the day, Patient interacted with peers, no evidence of AVH, compliant with medication  Patient denies SI/HI. Eating and sleeping well. BM last night and small one today. Patient in the hallway laughing and singing to staff. Mood and affect have improved.  Safety monitored throughout the shift per unit policy

## 2019-12-29 NOTE — PROGRESS NOTES
Problem: Depressed Mood (Adult/Pediatric)  Goal: *STG: Verbalizes anger, guilt, and other feelings in a constructive manor  Outcome: Not Progressing Towards Goal     Problem: Depressed Mood (Adult/Pediatric)  Goal: *STG: Demonstrates reduction in symptoms and increase in insight into coping skills/future focused  Outcome: Not Progressing Towards Goal     Problem: Depressed Mood (Adult/Pediatric)  Goal: *STG: Remains safe in hospital  Outcome: Progressing Towards Goal     Problem: Depressed Mood (Adult/Pediatric)  Goal: *STG: Complies with medication therapy  Outcome: Progressing Towards Goal     Problem: Depressed Mood (Adult/Pediatric)  Goal: *LTG: Returns to previous level of functioning and participates with after care plan  Outcome: Not Progressing Towards Goal  1910- I assume care of the patient. Patient asked if I'm going to be her nurse today and I stated yes. She said \"good then after my snacks, I want my medications\". Patient is sitting in the day room quiet watching TV with a flat affect. Patient denies SI/HI/AH/VH. 2000- Patient given milk of magnesium for constipation. 2030- Patient is eating her snacks and she blurts out curse words at the TV.  2100- Patient given scheduled Trazodone and Seroquel. Patient had a large hard bowel movement from the milk of magnesium. 2200- Patient saying loud curse words at times at the TV. Patient looked at me and apologized for saying the curse words. 2300- Patient is asleep in bed. 0000- Patient is asleep in bed.  0100- 0500- Patient is asleep in bed.  0600- Patient slept approx.  8 hrs

## 2019-12-30 VITALS
HEART RATE: 71 BPM | HEIGHT: 66 IN | WEIGHT: 186 LBS | RESPIRATION RATE: 16 BRPM | DIASTOLIC BLOOD PRESSURE: 65 MMHG | OXYGEN SATURATION: 100 % | SYSTOLIC BLOOD PRESSURE: 127 MMHG | BODY MASS INDEX: 29.89 KG/M2 | TEMPERATURE: 98.3 F

## 2019-12-30 PROCEDURE — 74011250637 HC RX REV CODE- 250/637: Performed by: PSYCHIATRY & NEUROLOGY

## 2019-12-30 RX ORDER — TRAZODONE HYDROCHLORIDE 150 MG/1
150 TABLET ORAL
Qty: 30 TAB | Refills: 0 | Status: SHIPPED | OUTPATIENT
Start: 2019-12-30 | End: 2020-09-18

## 2019-12-30 RX ORDER — FLUOXETINE HYDROCHLORIDE 20 MG/1
20 CAPSULE ORAL DAILY
Qty: 30 CAP | Refills: 0 | Status: SHIPPED | OUTPATIENT
Start: 2019-12-31 | End: 2020-09-18

## 2019-12-30 RX ORDER — QUETIAPINE FUMARATE 300 MG/1
300 TABLET, FILM COATED ORAL EVERY 12 HOURS
Qty: 60 TAB | Refills: 0 | Status: SHIPPED | OUTPATIENT
Start: 2019-12-30 | End: 2020-09-18

## 2019-12-30 RX ADMIN — QUETIAPINE FUMARATE 300 MG: 200 TABLET ORAL at 08:34

## 2019-12-30 RX ADMIN — FLUOXETINE HYDROCHLORIDE 20 MG: 20 CAPSULE ORAL at 08:34

## 2019-12-30 NOTE — PROGRESS NOTES
Problem: Depressed Mood (Adult/Pediatric)  Goal: *STG: Demonstrates reduction in symptoms and increase in insight into coping skills/future focused  Outcome: Progressing Towards Goal     Problem: Psychosis  Goal: *STG: Decreased hallucinations  Outcome: Progressing Towards Goal     Problem: Depressed Mood (Adult/Pediatric)  Goal: *STG: Complies with medication therapy  Outcome: Resolved/Met     Problem: Psychosis  Goal: *STG: Seeks staff when feelings of self harm or harm towards others arise  Outcome: Not Progressing Towards Goal  Goal: *STG: Patient will verbalize issues that get in their way of progress (i.e.: anger, fear etc.)  Outcome: Not Progressing Towards Goal

## 2019-12-30 NOTE — PROGRESS NOTES
Problem: Psychosis  Goal: *STG/LTG: Complies with medication therapy  Outcome: Progressing Towards Goal
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
## 2019-12-30 NOTE — BH NOTES
Report received from JANET West RN. Patient currently in hallway walking in and out of room. Patient asking for evening medications. Informed patient she would need to wait until 2100 to receive medication. 2030  Patient told she could possible be moved to the acute side because of her outburst towards peers. Patient verbalized she wanted to stay on the general side. Patients room was then blocked for agitation and aggression. Informed patient she would need to remain calm. Patient went to dayroom and had snack before returning to room, no issues. 2101  Patient given scheduled medications. 2705  Patient asleep in bed.    0622  Patient continued to sleep throughout night. No issues or concerns.     Sleep hours: 7
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
## 2019-12-30 NOTE — DISCHARGE INSTRUCTIONS
DISCHARGE SUMMARY    Chloe Amador  : 1990  MRN: [id number]    The patient Eddye Lanes is leaving AMA. Patient's level of functioning is improving. No assaultive/destructive behavior has been observed for the past 24 hours. No suicidal/homicidal threat or behavior has been observed for the past 24 hours. There is no evidence of serious medication side effects. Patient has not been in physical or protective restraints for at least the past 24 hours. If weapons involved, how are they secured? None involved. Is patient aware of and in agreement with discharge plan? Pt is leaving AMA - refusing to continue stabilization and have a safe dispo plan. Pt says she will be staying with a friend but cannot provide phone number or address. Arrangements for medication:  Prescriptions given to patient. Copy of discharge instructions to provider?:   [address]. Arrangements for transportation home:  Cab    Keep all follow up appointments as scheduled, continue to take prescribed medications per physician instructions. Mental health crisis number:  [phone] or your local mental health crisis line number at [phone]. DISCHARGE SUMMARY from Nurse    PATIENT INSTRUCTIONS:      What to do at Home:  Recommended activity: Activity as tolerated,       *  Please give a list of your current medications to your Primary Care Provider. *  Please update this list whenever your medications are discontinued, doses are      changed, or new medications (including over-the-counter products) are added. *  Please carry medication information at all times in case of emergency situations. These are general instructions for a healthy lifestyle:    No smoking/ No tobacco products/ Avoid exposure to second hand smoke  Surgeon General's Warning:  Quitting smoking now greatly reduces serious risk to your health.     Obesity, smoking, and sedentary lifestyle greatly increases your risk for illness    A healthy diet, regular physical exercise & weight monitoring are important for maintaining a healthy lifestyle    If I feel I am at risk of hurting myself or others, I will call the crisis office and speak with a crisis worker who will assist me during my crisis. Susy Gao   [phone]  [address] [place] [address] [phone]  [address]  [place] [address] [facility]  [phone]      Patient Education        Learning About Schizoaffective Disorder  What is schizoaffective disorder? Schizoaffective (say \"imkz-gy-rp-FECK-tiv\") disorder is a complex mental illness. People who have it have the symptoms of both schizophrenia and a mood disorder. The disorder affects how clearly you can think. It can also make it hard to manage your emotions and connect with others. And it affects how happy or sad you feel. What causes it? Experts don't know what causes schizoaffective disorder. It may have different causes for different people. It's not caused by anything you did or how your parents raised you. And it's not a sign of weakness. What are the symptoms? The symptoms of schizoaffective disorder are the same as those of schizophrenia and a mood disorder. People with schizoaffective disorder may have many of these symptoms. Schizophrenia symptoms include:  · Having hallucinations. This means that you see or hear things that aren't really there. · Having delusions. These are beliefs that aren't real.  · Having a hard time feeling and showing emotion. Mood disorder symptoms include:  · Depression. · Feeling extremely happy or having lots of energy. How is it diagnosed? Your doctor or mental health professional usually can tell if you have schizoaffective disorder by talking with you.  He or she will look at the order and timing of your symptoms and how long your symptoms last.  Your doctor will ask you about other things too. These may include questions about:  · Any odd experiences you may have had, such as hearing voices or having confusing thoughts. · Your feelings. · Any changes in eating habits, energy level, and interest in daily tasks. · How well you are sleeping. · If you can focus on the things you do. How is it treated? Finding out that you have schizoaffective disorder can be scary and hard to deal with. But the disorder can be treated. The goal of treatment is to lower your stress and help your brain work as it should. Ongoing treatment can keep the disorder under control. Treatment includes medicines and counseling. Medicines help your symptoms. It's important to take your medicines on schedule to keep your moods even. When you feel good, you may think that you don't need them. But it is important to keep taking them. Counseling helps you change how you think about things. It can also help you cope with the illness. You will work with a mental health professional. This may be a psychologist, a licensed professional counselor, a clinical , or a psychiatrist.  Follow-up care is a key part of your treatment and safety. Be sure to make and go to all appointments, and call your doctor if you are having problems. It's also a good idea to know your test results and keep a list of the medicines you take. Where can you learn more? Go to http://elijah-ananda.info/. Enter A016 in the search box to learn more about \"Learning About Schizoaffective Disorder. \"  Current as of: May 28, 2019  Content Version: 12.2  © 6291-6387 [employer]. Care instructions adapted under license by [employer] (which disclaims liability or warranty for this information).  If you have questions about a medical condition or this instruction, always ask your healthcare professional. [place] [address] any warranty or liability for your use of this information. The discharge information has been reviewed with the patient. The patient verbalized understanding. Discharge medications reviewed with the patient and appropriate educational materials and side effects teaching were provided.   ___________________________________________________________________________________________________________________________________

## 2019-12-30 NOTE — BH NOTES
Behavioral Health Interdisciplinary Rounds     Patient Name: Maggy Rivera  Age: 30 y.o.   Room/Bed:  [facility]/01  Primary Diagnosis: Schizoaffective disorder, depressive type ([facility]   Admission Status: Voluntary     Readmission within 30 days: no  Power of  in place: no  Patient requires a blocked bed: yes            Reason for blocked bed: aggression/agitation  Order for blocked bed obtained: yes       Sleep hours: 7       Participation in Care/Groups:  yes  Medication Compliant?: Yes  PRNS (last 24 hours): None    Restraints (last 24 hours):  no  Substance Abuse:  no    24 hour chart check complete: yes

## 2019-12-30 NOTE — BH NOTES
Patient alert and verbal. Denies SI/HI. Discharged home to continue recommended plan of care. Discharge instructions reviewed with patient. Patient verbalized understanding. Patient transported home via medicaid cab.

## 2020-01-24 NOTE — PROGRESS NOTES
Problem: Depressed Mood (Adult/Pediatric)  Goal: *STG: Participates in treatment plan  Outcome: Progressing Towards Goal  Goal: *STG: Attends activities and groups  Outcome: Progressing Towards Goal  Goal: *STG: Remains safe in hospital  Outcome: Progressing Towards Goal  Goal: *STG: Complies with medication therapy  Outcome: Progressing Towards Goal     Problem: Psychosis  Goal: *STG: Decreased hallucinations  Outcome: Progressing Towards Goal .

## 2020-09-11 PROBLEM — F10.20 ALCOHOL DEPENDENCE (HCC): Status: ACTIVE | Noted: 2020-09-11

## 2020-09-11 PROBLEM — F14.20 COCAINE DEPENDENCE (HCC): Status: ACTIVE | Noted: 2020-09-11

## 2020-09-11 PROBLEM — R45.851 SUICIDAL IDEATION: Status: ACTIVE | Noted: 2020-09-11

## 2020-09-11 PROBLEM — F12.20 CANNABIS DEPENDENCE (HCC): Status: ACTIVE | Noted: 2020-09-11

## 2020-09-11 PROBLEM — Z76.5 MALINGERING: Status: ACTIVE | Noted: 2020-09-11

## 2020-09-15 PROBLEM — F25.0 SCHIZOAFFECTIVE DISORDER, BIPOLAR TYPE (HCC): Status: ACTIVE | Noted: 2019-12-23

## 2020-10-15 ENCOUNTER — HOSPITAL ENCOUNTER (INPATIENT)
Age: 30
LOS: 6 days | Discharge: HOME | DRG: 750 | End: 2020-10-22
Attending: EMERGENCY MEDICINE | Admitting: PSYCHIATRY & NEUROLOGY
Payer: MEDICAID

## 2020-10-15 DIAGNOSIS — F10.920 ALCOHOLIC INTOXICATION WITHOUT COMPLICATION (HCC): Primary | ICD-10-CM

## 2020-10-15 DIAGNOSIS — F25.0 SCHIZOAFFECTIVE DISORDER, BIPOLAR TYPE (HCC): ICD-10-CM

## 2020-10-15 DIAGNOSIS — R45.850 HOMICIDAL IDEATION: ICD-10-CM

## 2020-10-15 LAB
ALBUMIN SERPL-MCNC: 3.7 G/DL (ref 3.4–5)
ALBUMIN/GLOB SERPL: 0.7 {RATIO} (ref 0.8–1.7)
ALP SERPL-CCNC: 96 U/L (ref 45–117)
ALT SERPL-CCNC: 51 U/L (ref 13–56)
AMPHET UR QL SCN: NEGATIVE
ANION GAP SERPL CALC-SCNC: 9 MMOL/L (ref 3–18)
AST SERPL-CCNC: 29 U/L (ref 10–38)
BARBITURATES UR QL SCN: NEGATIVE
BASOPHILS # BLD: 0 K/UL (ref 0–0.1)
BASOPHILS NFR BLD: 0 % (ref 0–2)
BENZODIAZ UR QL: NEGATIVE
BILIRUB SERPL-MCNC: <0.1 MG/DL (ref 0.2–1)
BUN SERPL-MCNC: 9 MG/DL (ref 7–18)
BUN/CREAT SERPL: 14 (ref 12–20)
CALCIUM SERPL-MCNC: 9.3 MG/DL (ref 8.5–10.1)
CANNABINOIDS UR QL SCN: NEGATIVE
CHLORIDE SERPL-SCNC: 109 MMOL/L (ref 100–111)
CO2 SERPL-SCNC: 23 MMOL/L (ref 21–32)
COCAINE UR QL SCN: NEGATIVE
CREAT SERPL-MCNC: 0.66 MG/DL (ref 0.6–1.3)
DIFFERENTIAL METHOD BLD: ABNORMAL
EOSINOPHIL # BLD: 0.2 K/UL (ref 0–0.4)
EOSINOPHIL NFR BLD: 2 % (ref 0–5)
ERYTHROCYTE [DISTWIDTH] IN BLOOD BY AUTOMATED COUNT: 14.6 % (ref 11.6–14.5)
ETHANOL SERPL-MCNC: 243 MG/DL (ref 0–3)
GLOBULIN SER CALC-MCNC: 5.2 G/DL (ref 2–4)
GLUCOSE SERPL-MCNC: 102 MG/DL (ref 74–99)
HCG UR QL: NEGATIVE
HCT VFR BLD AUTO: 31.2 % (ref 35–45)
HDSCOM,HDSCOM: NORMAL
HGB BLD-MCNC: 10.2 G/DL (ref 12–16)
LYMPHOCYTES # BLD: 2.4 K/UL (ref 0.9–3.6)
LYMPHOCYTES NFR BLD: 27 % (ref 21–52)
MCH RBC QN AUTO: 26.1 PG (ref 24–34)
MCHC RBC AUTO-ENTMCNC: 32.7 G/DL (ref 31–37)
MCV RBC AUTO: 79.8 FL (ref 74–97)
METHADONE UR QL: NEGATIVE
MONOCYTES # BLD: 0.6 K/UL (ref 0.05–1.2)
MONOCYTES NFR BLD: 6 % (ref 3–10)
NEUTS SEG # BLD: 5.6 K/UL (ref 1.8–8)
NEUTS SEG NFR BLD: 65 % (ref 40–73)
OPIATES UR QL: NEGATIVE
PCP UR QL: NEGATIVE
PLATELET # BLD AUTO: 399 K/UL (ref 135–420)
PMV BLD AUTO: 9.2 FL (ref 9.2–11.8)
POTASSIUM SERPL-SCNC: 3.5 MMOL/L (ref 3.5–5.5)
PROT SERPL-MCNC: 8.9 G/DL (ref 6.4–8.2)
RBC # BLD AUTO: 3.91 M/UL (ref 4.2–5.3)
SODIUM SERPL-SCNC: 141 MMOL/L (ref 136–145)
WBC # BLD AUTO: 8.8 K/UL (ref 4.6–13.2)

## 2020-10-15 PROCEDURE — 81025 URINE PREGNANCY TEST: CPT

## 2020-10-15 PROCEDURE — 80307 DRUG TEST PRSMV CHEM ANLYZR: CPT

## 2020-10-15 PROCEDURE — 99284 EMERGENCY DEPT VISIT MOD MDM: CPT

## 2020-10-15 PROCEDURE — 85025 COMPLETE CBC W/AUTO DIFF WBC: CPT

## 2020-10-15 PROCEDURE — 80053 COMPREHEN METABOLIC PANEL: CPT

## 2020-10-16 PROBLEM — F32.A DEPRESSION: Status: ACTIVE | Noted: 2020-10-16

## 2020-10-16 LAB
COVID-19 RAPID TEST, COVR: NOT DETECTED
ETHANOL SERPL-MCNC: 176 MG/DL (ref 0–3)
SOURCE, COVRS: NORMAL
SPECIMEN TYPE, XMCV1T: NORMAL

## 2020-10-16 PROCEDURE — 87635 SARS-COV-2 COVID-19 AMP PRB: CPT

## 2020-10-16 PROCEDURE — 65220000003 HC RM SEMIPRIVATE PSYCH

## 2020-10-16 PROCEDURE — 74011250637 HC RX REV CODE- 250/637: Performed by: PSYCHIATRY & NEUROLOGY

## 2020-10-16 PROCEDURE — 80307 DRUG TEST PRSMV CHEM ANLYZR: CPT

## 2020-10-16 RX ORDER — HALOPERIDOL 5 MG/ML
5 INJECTION INTRAMUSCULAR
Status: DISCONTINUED | OUTPATIENT
Start: 2020-10-16 | End: 2020-10-22 | Stop reason: HOSPADM

## 2020-10-16 RX ORDER — LORAZEPAM 2 MG/ML
2 INJECTION INTRAMUSCULAR
Status: DISCONTINUED | OUTPATIENT
Start: 2020-10-16 | End: 2020-10-22 | Stop reason: HOSPADM

## 2020-10-16 RX ORDER — QUETIAPINE FUMARATE 300 MG/1
300 TABLET, FILM COATED ORAL 2 TIMES DAILY
Status: DISCONTINUED | OUTPATIENT
Start: 2020-10-16 | End: 2020-10-19

## 2020-10-16 RX ORDER — HALOPERIDOL 5 MG/1
5 TABLET ORAL
Status: DISCONTINUED | OUTPATIENT
Start: 2020-10-16 | End: 2020-10-22 | Stop reason: HOSPADM

## 2020-10-16 RX ORDER — BENZTROPINE MESYLATE 1 MG/1
1 TABLET ORAL
Status: DISCONTINUED | OUTPATIENT
Start: 2020-10-16 | End: 2020-10-22 | Stop reason: HOSPADM

## 2020-10-16 RX ORDER — LORAZEPAM 1 MG/1
1 TABLET ORAL
Status: DISCONTINUED | OUTPATIENT
Start: 2020-10-16 | End: 2020-10-22 | Stop reason: HOSPADM

## 2020-10-16 RX ORDER — TRAZODONE HYDROCHLORIDE 50 MG/1
50 TABLET ORAL
Status: DISCONTINUED | OUTPATIENT
Start: 2020-10-16 | End: 2020-10-22 | Stop reason: HOSPADM

## 2020-10-16 RX ADMIN — TRAZODONE HYDROCHLORIDE 50 MG: 50 TABLET ORAL at 20:06

## 2020-10-16 RX ADMIN — QUETIAPINE FUMARATE 300 MG: 300 TABLET ORAL at 20:06

## 2020-10-16 RX ADMIN — LORAZEPAM 1 MG: 1 TABLET ORAL at 20:06

## 2020-10-16 NOTE — ED TRIAGE NOTES
Patient A/O x 4, presented to ED for mental health evaluation. Patient states, \"I'm homicidal not suicidal\".

## 2020-10-16 NOTE — ED NOTES
Patient was turned over to me at the regular change of shift by Dr. Bina Ng, at 0700. Patient presented today with Homicidal ideations. Follow-up tests include N/A. Plan for this patient is: Crisis evidently was not available last night for some reason so they need to be contacted this morning so patient could be evaluated. Case was discussed with the crisis worker. Plan will be for admission. COVID test was performed and negative. Patient medically suitable for psych placement. Please see separate crisis notes for further details    Patient to be admitted for further care by psychiatry.     Eugenie Mukherjee DO

## 2020-10-16 NOTE — ED NOTES
Patient resting on stretcher with no signs of distress. Chest rise and fall present. Will continue to monitor patient.

## 2020-10-16 NOTE — ED NOTES
Pt has been cooperative, sleeping at times throughout the day. Sitter at bedside.  Report called to Cristin Tejada

## 2020-10-16 NOTE — ED NOTES
Pt remains resting quietly in bed with sitter at bedside.  Report received from [facility]
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
## 2020-10-16 NOTE — ED PROVIDER NOTES
[facility]. [facility]  Emergency Department Treatment Report        11:09 PM Sanjiv Solorio is a 26 y.o. female with a history of his schizoaffective disorder who presents to ED for mental health evaluation patient states that she is homicidal she does not have a plan now she is not suicidal.  No other complaints. No other complaints, associated symptoms or modifying factors at this time. PCP: None      The history is provided by the patient. No  was used. Mental Health Problem   The current episode started today. The degree of incapacity that she is experiencing as a consequence of her illness is moderate. Additional symptoms of the illness do not include abdominal pain. She does not admit to suicidal ideas. She does not have a plan to attempt suicide. She does not contemplate harming herself. She has not already injured self. She contemplates injuring another person. She has not already  injured another person. Risk factors that are present for mental illness include a history of mental illness. Past Medical History:   Diagnosis Date    Schizoaffective disorder ([facility]        No past surgical history on file. No family history on file.     Social History     Socioeconomic History    Marital status: SINGLE     Spouse name: Not on file    Number of children: Not on file    Years of education: Not on file    Highest education level: Not on file   Occupational History    Not on file   Social Needs    Financial resource strain: Not on file    Food insecurity     Worry: Not on file     Inability: Not on file    Transportation needs     Medical: Not on file     Non-medical: Not on file   Tobacco Use    Smoking status: Never Smoker    Smokeless tobacco: Never Used   Substance and Sexual Activity    Alcohol use: Not Currently    Drug use: Never    Sexual activity: Not on file   Lifestyle    Physical activity     Days per week: Not on file     Minutes per session: Not on file    Stress: Not on file   Relationships    Social connections     Talks on phone: Not on file     Gets together: Not on file     Attends Pent[employer]ostalism service: Not on file     Active member of club or organization: Not on file     Attends meetings of clubs or organizations: Not on file     Relationship status: Not on file    Intimate partner violence     Fear of current or ex partner: Not on file     Emotionally abused: Not on file     Physically abused: Not on file     Forced sexual activity: Not on file   Other Topics Concern    Not on file   Social History Narrative    Not on file         ALLERGIES: Patient has no known allergies. Review of Systems   Constitutional: Negative for fever. Respiratory: Negative for chest tightness. Gastrointestinal: Negative for abdominal pain. Psychiatric/Behavioral: Negative for self-injury and suicidal ideas. All other systems reviewed and are negative. Vitals:    10/15/20 2234   BP: 122/88   Pulse: 97   Resp: 18   Temp: 98 °F (36.7 °C)   SpO2: 99%            Physical Exam  Vitals signs and nursing note reviewed. Constitutional:       Appearance: She is well-developed. HENT:      Head: Normocephalic and atraumatic. Eyes:      Conjunctiva/sclera: Conjunctivae normal.      Pupils: Pupils are equal, round, and reactive to light. Neck:      Musculoskeletal: Normal range of motion and neck supple. Cardiovascular:      Rate and Rhythm: Normal rate and regular rhythm. Pulmonary:      Effort: Pulmonary effort is normal.      Breath sounds: Normal breath sounds. Abdominal:      General: Bowel sounds are normal.      Palpations: Abdomen is soft. Musculoskeletal: Normal range of motion. Skin:     General: Skin is warm and dry. Neurological:      Mental Status: She is alert and oriented to person, place, and time. Deep Tendon Reflexes: Reflexes are normal and symmetric.    Psychiatric:         Attention and Perception: Attention normal. Mood and Affect: Affect is labile. Behavior: Behavior normal. Behavior is cooperative. Thought Content: Thought content includes homicidal ideation. Thought content does not include suicidal ideation. Thought content does not include homicidal plan. Judgment: Judgment normal.          MDM  Number of Diagnoses or Management Options  Diagnosis management comments: Patient will be evaluated for mental health clearance    Patient's alcohol is 243 will be repeated and 5 to 6 hours. Patient's repeat ethanol has been ordered patient is pending medical clearance. Amount and/or Complexity of Data Reviewed  Clinical lab tests: ordered and reviewed  Review and summarize past medical records: yes  Independent visualization of images, tracings, or specimens: yes    Risk of Complications, Morbidity, and/or Mortality  Presenting problems: moderate  Diagnostic procedures: moderate  Management options: moderate    Patient Progress  Patient progress: stable         Procedures          Vitals:  Patient Vitals for the past 12 hrs:   Temp Pulse Resp BP SpO2   10/15/20 2234 98 °F (36.7 °C) 97 18 122/88 99 %       Medications ordered:   Medications - No data to display      Lab findings:  Recent Results (from the past 12 hour(s))   CBC WITH AUTOMATED DIFF    Collection Time: 10/15/20 10:40 PM   Result Value Ref Range    WBC 8.8 4.6 - 13.2 K/uL    RBC 3.91 (L) 4.20 - 5.30 M/uL    HGB 10.2 (L) 12.0 - 16.0 g/dL    HCT 31.2 (L) 35.0 - 45.0 %    MCV 79.8 74.0 - 97.0 FL    MCH 26.1 24.0 - 34.0 PG    MCHC 32.7 31.0 - 37.0 g/dL    RDW 14.6 (H) 11.6 - 14.5 %    PLATELET 836 930 - 499 K/uL    MPV 9.2 9.2 - 11.8 FL    NEUTROPHILS 65 40 - 73 %    LYMPHOCYTES 27 21 - 52 %    MONOCYTES 6 3 - 10 %    EOSINOPHILS 2 0 - 5 %    BASOPHILS 0 0 - 2 %    ABS. NEUTROPHILS 5.6 1.8 - 8.0 K/UL    ABS. LYMPHOCYTES 2.4 0.9 - 3.6 K/UL    ABS. MONOCYTES 0.6 0.05 - 1.2 K/UL    ABS. EOSINOPHILS 0.2 0.0 - 0.4 K/UL    ABS.  BASOPHILS 0.0 0.0 - 0.1 K/UL    DF AUTOMATED     METABOLIC PANEL, COMPREHENSIVE    Collection Time: 10/15/20 10:40 PM   Result Value Ref Range    Sodium 141 136 - 145 mmol/L    Potassium 3.5 3.5 - 5.5 mmol/L    Chloride 109 100 - 111 mmol/L    CO2 23 21 - 32 mmol/L    Anion gap 9 3.0 - 18 mmol/L    Glucose 102 (H) 74 - 99 mg/dL    BUN 9 7.0 - 18 MG/DL    Creatinine 0.66 0.6 - 1.3 MG/DL    BUN/Creatinine ratio 14 12 - 20      GFR est AA >60 >60 ml/min/1.73m2    GFR est non-AA >60 >60 ml/min/1.73m2    Calcium 9.3 8.5 - 10.1 MG/DL    Bilirubin, total <0.1 (L) 0.2 - 1.0 MG/DL    ALT (SGPT) 51 13 - 56 U/L    AST (SGOT) 29 10 - 38 U/L    Alk. phosphatase 96 45 - 117 U/L    Protein, total 8.9 (H) 6.4 - 8.2 g/dL    Albumin 3.7 3.4 - 5.0 g/dL    Globulin 5.2 (H) 2.0 - 4.0 g/dL    A-G Ratio 0.7 (L) 0.8 - 1.7     ETHYL ALCOHOL    Collection Time: 10/15/20 10:40 PM   Result Value Ref Range    ALCOHOL(ETHYL),SERUM 243 (H) 0 - 3 MG/DL   DRUG SCREEN, URINE    Collection Time: 10/15/20 10:45 PM   Result Value Ref Range    BENZODIAZEPINES Negative NEG      BARBITURATES Negative NEG      THC (TH-CANNABINOL) Negative NEG      OPIATES Negative NEG      PCP(PHENCYCLIDINE) Negative NEG      COCAINE Negative NEG      AMPHETAMINES Negative NEG      METHADONE Negative NEG      HDSCOM (NOTE)    HCG URINE, QL    Collection Time: 10/15/20 10:45 PM   Result Value Ref Range    HCG urine, QL Negative NEG                Disposition:    Diagnosis:   1. Alcoholic intoxication without complication ([address]    2. Homicidal ideation        Disposition: TBD      Follow-up Information    None          Patient's Medications   Start Taking    No medications on file   Continue Taking    NICOTINE (NICODERM CQ) 21 MG/24 HR    1 Patch by TransDERmal route daily as needed (nicotine replacement) for up to 30 days. OXCARBAZEPINE (TRILEPTAL) 600 MG TABLET    Take 1 Tab by mouth two (2) times a day. QUETIAPINE (SEROQUEL) 300 MG TABLET    Take 1 Tab by mouth nightly. TRAZODONE (DESYREL) 100 MG TABLET    Take 1 Tab by mouth nightly as needed for Sleep. These Medications have changed    No medications on file   Stop Taking    No medications on file           2:13 AM : Pt care transferred to Dr. Clint Fong  ,ED provider. History of patient complaint(s), available diagnostic reports and current treatment plan has been discussed thoroughly. Bedside rounding on patient occured : no . Intended disposition of patient : TBD  Pending diagnostics reports and/or labs (please list): Pending repeat alcohol and medical clearance and mental health disposition. Tania Dougherty ENP-C,FNP-C      Dragon voice recognition was used to generate this report, which may have resulted in some phonetic based errors in grammar and contents.  Even though attempts were made to correct all the mistakes, some may have been missed, and remained in the body of the document

## 2020-10-17 PROCEDURE — 74011250637 HC RX REV CODE- 250/637: Performed by: PSYCHIATRY & NEUROLOGY

## 2020-10-17 PROCEDURE — 65220000003 HC RM SEMIPRIVATE PSYCH

## 2020-10-17 RX ORDER — SERTRALINE HYDROCHLORIDE 50 MG/1
50 TABLET, FILM COATED ORAL DAILY
Status: DISCONTINUED | OUTPATIENT
Start: 2020-10-17 | End: 2020-10-22 | Stop reason: HOSPADM

## 2020-10-17 RX ORDER — FOLIC ACID 1 MG/1
1 TABLET ORAL DAILY
Status: DISCONTINUED | OUTPATIENT
Start: 2020-10-18 | End: 2020-10-21

## 2020-10-17 RX ORDER — LORAZEPAM 1 MG/1
1 TABLET ORAL
Status: DISCONTINUED | OUTPATIENT
Start: 2020-10-17 | End: 2020-10-22 | Stop reason: HOSPADM

## 2020-10-17 RX ORDER — LORAZEPAM 1 MG/1
2 TABLET ORAL
Status: DISCONTINUED | OUTPATIENT
Start: 2020-10-17 | End: 2020-10-22 | Stop reason: HOSPADM

## 2020-10-17 RX ORDER — LANOLIN ALCOHOL/MO/W.PET/CERES
100 CREAM (GRAM) TOPICAL DAILY
Status: DISCONTINUED | OUTPATIENT
Start: 2020-10-18 | End: 2020-10-21

## 2020-10-17 RX ORDER — THERA TABS 400 MCG
1 TAB ORAL DAILY
Status: DISCONTINUED | OUTPATIENT
Start: 2020-10-18 | End: 2020-10-21

## 2020-10-17 RX ADMIN — LORAZEPAM 1 MG: 1 TABLET ORAL at 20:12

## 2020-10-17 RX ADMIN — QUETIAPINE FUMARATE 300 MG: 300 TABLET ORAL at 08:13

## 2020-10-17 RX ADMIN — QUETIAPINE FUMARATE 300 MG: 300 TABLET ORAL at 20:12

## 2020-10-17 RX ADMIN — SERTRALINE HYDROCHLORIDE 50 MG: 50 TABLET ORAL at 12:52

## 2020-10-17 RX ADMIN — TRAZODONE HYDROCHLORIDE 50 MG: 50 TABLET ORAL at 20:12
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
## 2020-10-17 NOTE — PROGRESS NOTES
Problem: Falls - Risk of  Goal: *Absence of Falls  Description: Document Balta Rodriguez Fall Risk and appropriate interventions in the flowsheet. Pt will be free of falls each shift. Outcome: Progressing Towards Goal  Note: Fall Risk Interventions:            Medication Interventions: Teach patient to arise slowly                   Problem: Suicide  Goal: *STG: Remains safe in hospital  Description: Pt will be free of harm each shift. Outcome: Progressing Towards Goal  Goal: *STG: Seeks staff when feelings of self harm or harm towards others arise  Description: Pt will contract for safety each shift. Outcome: Progressing Towards Goal  Goal: *STG: Attends activities and groups  Description: Pt will attend all groups each shift. Outcome: Progressing Towards Goal  Goal: *STG/LTG: Complies with medication therapy  Description: Pt will take all meds as prescribed each shift  Outcome: Progressing Towards Goal  Goal: *STG/LTG: No longer expresses self destructive or suicidal thoughts  Description: Pt will no longer expressed homicidal thoughts each shift. Outcome: Progressing Towards Goal  Goal: Interventions  Outcome: Progressing Towards Goal   Pt is guarded and uninterested in one to one. She states she hears voices but they don't tell her anything. She denies any thoughts of harming self. Pt stated nonchalantly as she walked away she has thoughts of harming others in general. She states she slept well last night and is compliant with medication.

## 2020-10-18 PROCEDURE — 65220000003 HC RM SEMIPRIVATE PSYCH

## 2020-10-18 PROCEDURE — 74011250637 HC RX REV CODE- 250/637: Performed by: PSYCHIATRY & NEUROLOGY

## 2020-10-18 RX ADMIN — QUETIAPINE FUMARATE 300 MG: 300 TABLET ORAL at 08:05

## 2020-10-18 RX ADMIN — QUETIAPINE FUMARATE 300 MG: 300 TABLET ORAL at 20:37

## 2020-10-18 RX ADMIN — SERTRALINE HYDROCHLORIDE 50 MG: 50 TABLET ORAL at 08:05

## 2020-10-18 RX ADMIN — TRAZODONE HYDROCHLORIDE 50 MG: 50 TABLET ORAL at 20:39

## 2020-10-18 RX ADMIN — LORAZEPAM 1 MG: 1 TABLET ORAL at 20:39
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
## 2020-10-18 NOTE — GROUP NOTE
HORTENCIA  GROUP DOCUMENTATION INDIVIDUAL Group Therapy Note Date: 10/18/2020 Group Start Time: 200 Group End Time: 1800 Group Topic: Nursing [facility] SPECIAL TRTMT 1 Ericka Hernández RN 
 
[facility] GROUP DOCUMENTATION GROUP Group Therapy Note Attendees: 5 Attendance: Attended Interventions/techniques: Informed Follows Directions: Followed directions Interactions: Interacted appropriately Mental Status: Calm Behavior/appearance: Cooperative Goals Achieved: Able to listen to others Karina Sutherland RN

## 2020-10-18 NOTE — PROGRESS NOTES
The patient's chart was reviewed, she was discussed with nursing staff and she was seen during rounds. Per nursing staff, the patient continues to appear angry and has been isolative. During rounds, the patient tolerating Zoloft without difficulty. She reports sleeping well. She continues to c/o HI towards \"just people. \" She reports HI towards her roommate \"a little bit. \"      Vitals:  T-99.5   BP-106/69  P-74   RR-16     MSE:  33yo female. Seen at her bedside. Lying in bed. Obese. Fairly groomed. Appears stated age. Cooperative. Speech is normal in rate, volume and tone. Eye contact is decreased. No abnormal movements. Stated mood is, \"Up and down. \" Affect is full and appears down. Thoughts are goal-directed and succinct. Endorses AH \"a little bit. \" Denies SI. Endorses HI \"a little bit. \" Insight and judgment are impaired. A/P-Schizoaffective disorder, bipolar type; Posttraumatic stress disorder  1. Continue Seroquel and Zoloft. 2. Will have to contact roommate if patient continues to endorse HI before discharge. 3. Continue hospitalization.

## 2020-10-18 NOTE — PROGRESS NOTES
Problem: Falls - Risk of  Goal: *Absence of Falls  Description: Document Edmonia Morning Fall Risk and appropriate interventions in the flowsheet. Pt will be free of falls each shift. Outcome: Progressing Towards Goal  Note: Fall Risk Interventions:            Medication Interventions: Teach patient to arise slowly                   Problem: Suicide  Goal: *STG: Remains safe in hospital  Description: Pt will be free of harm each shift. Outcome: Progressing Towards Goal  Goal: *STG: Seeks staff when feelings of self harm or harm towards others arise  Description: Pt will contract for safety each shift. Outcome: Progressing Towards Goal  Goal: *STG: Attends activities and groups  Description: Pt will attend all groups each shift. Outcome: Progressing Towards Goal  Goal: *STG/LTG: Complies with medication therapy  Description: Pt will take all meds as prescribed each shift  Outcome: Progressing Towards Goal  Goal: *STG/LTG: No longer expresses self destructive or suicidal thoughts  Description: Pt will no longer expressed homicidal thoughts each shift. Outcome: Progressing Towards Goal  Goal: Interventions  Outcome: Progressing Towards Goal   Pt is uninterested in one to one. Her responses are brief and she walks away abruptly. Pt states she hears voices and feels like hurting people but it is no one in here. She denies any thoughts of self harm. Pt denies any withdrawal symptoms and refused all vitamins. She was compliant with her other medications. She is isolative and quiet.

## 2020-10-18 NOTE — BH NOTES
The writer has observed the patient's behavior throughout this shift ([id number]). During this shift patient has been cooperative and pleasant with staff. Patient has participated in nursing group. Patient was observed watching television, but does not interact unless being talked to. In addition, the patient told the writer that she likes the [employer] football team, also she stated at times she feels like she doesn't know herself, '' I don't know who I am\". Patient said that she talks to voices, but with no intentions of hurting herself or others. Patient has been in compliance with scheduled medications. Will continue to monitor the patient's safety and safety locations.

## 2020-10-19 PROBLEM — Z76.5 MALINGERING: Status: RESOLVED | Noted: 2020-09-11 | Resolved: 2020-10-19

## 2020-10-19 PROBLEM — F14.20 COCAINE DEPENDENCE (HCC): Status: RESOLVED | Noted: 2020-09-11 | Resolved: 2020-10-19

## 2020-10-19 PROBLEM — F12.20 CANNABIS DEPENDENCE (HCC): Status: RESOLVED | Noted: 2020-09-11 | Resolved: 2020-10-19

## 2020-10-19 PROBLEM — F32.A DEPRESSION: Status: RESOLVED | Noted: 2020-10-16 | Resolved: 2020-10-19

## 2020-10-19 PROCEDURE — 74011250637 HC RX REV CODE- 250/637: Performed by: PSYCHIATRY & NEUROLOGY

## 2020-10-19 PROCEDURE — 99232 SBSQ HOSP IP/OBS MODERATE 35: CPT | Performed by: PSYCHIATRY & NEUROLOGY

## 2020-10-19 PROCEDURE — 65220000003 HC RM SEMIPRIVATE PSYCH

## 2020-10-19 RX ORDER — QUETIAPINE FUMARATE 300 MG/1
300 TABLET, FILM COATED ORAL
Status: DISCONTINUED | OUTPATIENT
Start: 2020-10-19 | End: 2020-10-22 | Stop reason: HOSPADM

## 2020-10-19 RX ADMIN — QUETIAPINE FUMARATE 300 MG: 300 TABLET ORAL at 20:23

## 2020-10-19 RX ADMIN — LORAZEPAM 1 MG: 1 TABLET ORAL at 20:23

## 2020-10-19 RX ADMIN — QUETIAPINE FUMARATE 300 MG: 300 TABLET ORAL at 07:56

## 2020-10-19 RX ADMIN — SERTRALINE HYDROCHLORIDE 50 MG: 50 TABLET ORAL at 07:56

## 2020-10-19 RX ADMIN — TRAZODONE HYDROCHLORIDE 50 MG: 50 TABLET ORAL at 20:23

## 2020-10-19 NOTE — GROUP NOTE
HORTENCIA  GROUP DOCUMENTATION INDIVIDUAL Group Therapy Note Date: 10/19/2020 Group Start Time: 1400 Group End Time: 8849 Group Topic: Social Work Group [facility] 1 ADULT CHEM DEP Ronit [place], [address] [facility] Group Therapy Note Attendees: 5 Attendance: Attended Interventions/techniques: Validated Follows Directions: Followed directions Interactions: Interacted appropriately Mental Status: Calm Behavior/appearance: Attentive Goals Achieved: Able to listen to others Lucero Mobley

## 2020-10-19 NOTE — PROGRESS NOTES
[address]  Inpatient Progress Note     Date of Service: 10/19/20  Hospital Day: 3     Subjective/Interval History   10/19/20    Treatment Team Notes:  Notes reviewed and/or discussed and report that Adam Gómez is a 41-year-old female with history of schizoaffective disorder who was admitted for homicidal ideation towards roommate. Per nursing report, patient has been withdrawn to herself and her mood has been labile. Sometimes irritable and sometimes pleasant. She has been endorsing homicidal ideations towards no specific individual.    Patient interview: Adam Gómez was interviewed by this writer today. Events leading to hospitalization were reviewed. Patient continues to endorse homicidal ideation towards roommate and says she will harm her if she sees her. She endorses depressed mood but denies suicidal ideation. She rates her depression as a 6 on a scale of 1-10 with 10 being severe and suicidal.  She endorses auditory and visual hallucinations. She also endorses feeling paranoid for a while. She says she has been sleeping fairly but she has nightmares. Her appetite is okay and her energy level is low. She is tolerating current medication and denies sedation. Blood pressure observed to be low this morning. The patient states she has no social support system. When asked about family members such as mother, father or siblings she just said no. She also reports history of multiple inpatient hospitalizations in this year with no outpatient follow-up. She says she is set up with follow-up when she leaves the hospital but she never keeps that appointment and ends up going back to the emergency department and getting hospitalized again when she decompensates.       Objective     Visit Vitals  BP (!) 89/60 (BP 1 Location: Right arm, BP Patient Position: Sitting)   Pulse 81   Temp 98.8 °F (37.1 °C)   Resp 16   SpO2 99%       No results found for this or any previous visit (from the past 24 hour(s)). Mental Status Examination     Appearance/Hygiene 26 y.o. BLACK OR  female  Hygiene: Fair   Behavior/Social Relatedness Appropriate   Musculoskeletal Gait/Station: appropriate  Tone (flaccid, cogwheeling, spastic): Normal  Psychomotor (hyperkinetic, hypokinetic): calm   Involuntary movements (tics, dyskinesias, akathisa, stereotypies): none   Speech   Rate, rhythm, volume, fluency and articulation are appropriate   Mood   depressed   Affect    restricted   Thought Process Linear and goal directed   Thought Content and Perceptual Disturbances Denies self-injurious behavior (SIB), suicidal ideation (SI),     Endorses homicidal ideation, auditory and visual hallucinations   Sensorium and Cognition  alert and oriented x4, attention and concentration is intact, recent and remote memory is intact, language is fluent and within normal limits, fund of knowledge is appropriate. Insight  Limited   Judgment  Limited        Assessment/Plan      Psychiatric Diagnoses:   Patient Active Problem List   Diagnosis Code    Schizoaffective disorder, bipolar type ([facility] F25.0    Suicidal ideation R45.851    Alcohol dependence [facility] F10.20       Clearance Curet is a 26 y.o. with a history of treatment noncompliance who is currently admitted for depression and homicidal ideation. 1.  Continue Zoloft 50 mg daily for mood. Decrease Seroquel to 300 mg at bedtime as the 600 mg dose may be too high and  causing hypotension. Based on review of records, patient was prescribed Seroquel 300 mg at bedtime during her last hospitalization on September 24, 2020 at [facility]. 2.  Reviewed instructions, risks, benefits and side effects of medications  3. Disposition/Discharge Date: self-care/home, to be determined.     Mary Lou Ferreira MD [facility]  Psychiatry

## 2020-10-19 NOTE — GROUP NOTE
HORTENCIA  GROUP DOCUMENTATION INDIVIDUAL Group Therapy Note Date: 10/19/2020 Group Start Time: 46 Group End Time: 1000 Group Topic: Nursing [facility] 1 SPECIAL TRTMT 1 ALTON Muller  GROUP DOCUMENTATION GROUP Group Therapy Note Attendees: 2 Attendance: Did not attend Salima Fair RN

## 2020-10-19 NOTE — BH NOTES
The patient spent most of the evening watching television in the milieu. She has been interacting with her peers and she has been very talkative and open to the the assigned on the unit this evening. The patient has been medication compliant and she contracts for safety. Will continue to monitor and will continue to provide support.

## 2020-10-19 NOTE — BSMART NOTE
[facility] Biopsychosocial Assessment Current Level of Psychosocial Functioning  
 
[x]Independent 
[]Dependent []Minimal Assist 
 
 
Comments:   
 
Psychosocial High Risk Factors (check all that apply) []Unable to obtain meds []Chronic illness/pain []Substance abuse  
[]Lack of Family Support []Financial stress []Isolation []Inadequate [employer] [x]Suicide attempt(s) []Not taking medications []Victim of crime []Developmental Delay 
[]Unable to manage personal needs []Age 72 or older  
[]  Homeless []Jonathan transportation []Readmission within 30 days []Unemployment []Traumatic Event Psychiatric Advanced Directive:  None reported by patient. Family to involve in treatment: Patient reports no family to involve in treatment. Sexual Orientation: Patient is hesitant to address Patient Strengths: Patient reports the need for change and is aware of her needs. Patient Barriers: Patient resides in a group home where she is assisted with daily living. Patient reports some challenges. Opiate education provided: N/A Safety plan: Patient is able to contract for safety. CMHC/MH history:  
 
Schizoaffective disorder, bipolar type ([facility] F25.0  
 Suicidal ideation R45.851  Alcohol dependence ([facility] F10.20 
  
Plan of Care: 
medication management, group/individual therapies, family meetings, psycho -education, treatment team meetings to assist with stabilization Initial Discharge Plan:  SW will contact collateral to address discharge plan. Clinical Summary:  Sonia Dutta is a 29-year-old female with history of schizoaffective disorder who was admitted for homicidal ideation towards roommate Patient is observed isolated in the milieu.   Patient is polite and engaged with interview. Patient reports she has been having difficulty with a new tenant who moved into her group home. She reports the tenant is a personal friend of hers and they had a difficult relationship \"on the outside\". SW will contact collateral to address history. Patient reports she has family in [address] [place] [address] who will support her upon discharge. Patient is encouraged to attend group therapy to address thoughts and feelings. SW provided support, encouragement and empowered to address issues. SW will continue to monitor patient and will assist as needed.  
 
Hilda Dobbs, LCSW-E

## 2020-10-19 NOTE — BSMART NOTE
OCCUPATIONAL THERAPY PROGRESS NOTE Group Time:  1430 Attendance: The patient attended full group. Participation:. The patient participated with minimal elaboration in the activity. Attention: The patient needed redirection to activity at least once. Interaction: The patient acknowledges others or responds to questions,  with no spontaneous interaction. Only partially participated, guarded.

## 2020-10-19 NOTE — PROGRESS NOTES
Problem: Suicide  Goal: *STG: Remains safe in hospital  Description: Pt will be free of harm each shift. Outcome: Progressing Towards Goal  Goal: *STG: Seeks staff when feelings of self harm or harm towards others arise  Description: Pt will contract for safety each shift. Outcome: Progressing Towards Goal  Goal: *STG: Attends activities and groups  Description: Pt will attend all groups each shift. Outcome: Progressing Towards Goal   Patient has a flat affect , mood is stable. Denies SI. Patient out in day area for breakfast. She refused all of her vitamins but took her other medications.

## 2020-10-20 PROCEDURE — 74011250637 HC RX REV CODE- 250/637: Performed by: PSYCHIATRY & NEUROLOGY

## 2020-10-20 PROCEDURE — 65220000003 HC RM SEMIPRIVATE PSYCH

## 2020-10-20 PROCEDURE — 99231 SBSQ HOSP IP/OBS SF/LOW 25: CPT | Performed by: PSYCHIATRY & NEUROLOGY

## 2020-10-20 RX ORDER — IBUPROFEN 400 MG/1
400 TABLET ORAL
Status: DISCONTINUED | OUTPATIENT
Start: 2020-10-20 | End: 2020-10-22 | Stop reason: HOSPADM

## 2020-10-20 RX ADMIN — LORAZEPAM 1 MG: 1 TABLET ORAL at 20:19

## 2020-10-20 RX ADMIN — QUETIAPINE FUMARATE 300 MG: 300 TABLET ORAL at 20:06

## 2020-10-20 RX ADMIN — IBUPROFEN 400 MG: 400 TABLET, FILM COATED ORAL at 19:08

## 2020-10-20 RX ADMIN — SERTRALINE HYDROCHLORIDE 50 MG: 50 TABLET ORAL at 08:09

## 2020-10-20 RX ADMIN — TRAZODONE HYDROCHLORIDE 50 MG: 50 TABLET ORAL at 20:19

## 2020-10-20 NOTE — GROUP NOTE
HORTENCIA  GROUP DOCUMENTATION INDIVIDUAL Group Therapy Note Date: 10/20/2020 Group Start Time: 3970 Group End Time: 2851 Group Topic: Nursing [facility] 1 SPECIAL TRTMT 1 ALTON Tejada  GROUP DOCUMENTATION GROUP Group Therapy Note Attendees: 3 Attendance: Attended Patient's Goal:  Mental Health Relapse Interventions/techniques: Informed Follows Directions: Followed directions Interactions: Interacted appropriately Mental Status: Calm Behavior/appearance: Cooperative Goals Achieved: Able to engage in interactions and Able to listen to others Cesario Vargas RN

## 2020-10-20 NOTE — GROUP NOTE
HORTENCIA  GROUP DOCUMENTATION INDIVIDUAL Group Therapy Note Date: 10/19/2020 Group Start Time: 2000 Group End Time: 2015 Group Topic: Nursing [facility] 1 SPECIAL TRT 1 Crew, [address] [facility] Group Therapy Note Attendees: 6 Attendance: Attended Patient's Goal: Interventions/techniques: Informed Follows Directions: Followed directions Interactions: Interacted appropriately Mental Status: Elevated Behavior/appearance: Attentive and Cooperative Goals Achieved: Able to engage in interactions and Able to listen to others Additional Notes:   
 
 
 
Marco Fair

## 2020-10-20 NOTE — BSMART NOTE
Clinical Summary:  Quin Velazco a 49-year-old female with history of schizoaffective disorder who was admitted for homicidal ideation towards roommate. Patient is observed isolated in the milieu. Patient is compliant with interview. Patient reports upon discharge she will return to her grandmothers home. Patient continues to endorse faint thoughts of suicide and auditory hallucinations. SW provided supportive counseling. SW allowed patient to address concerns related to issues she faced in her last placement. SW addressed impulse control and self reflection. SW encouraged group therapy and will continue to support patient as needed. SW Collateral:  ANTONELLA [phone] with [facility] who report patient will continue services with the agency and will follow up with [facility] for outpatient services. SW will continue to monitor patient and will assist with discharge as needed.  
 
Jonathan Yen LCSW-E

## 2020-10-20 NOTE — BSMART NOTE
ART THERAPY GROUP PROGRESS NOTE PATIENT SCHEDULED FOR GROUP AT: 10:00 
 
ATTENDANCE: Ful PARTICIPATION LEVEL: Participates fully in the art process ATTENTION LEVEL : Able to focus on task FOCUS: Grounding SYMBOLIC & THEMATIC CONTENT AS NOTED IN IMAGERY: She was cheerful and talkative. She would frequently joke with peers and was invested in the task at hand. She claimed that she was \"happy to not have to deal with the voices anymore\" and claimed that she \"hopes to move in a house with her girlfriend. \" Associations were general with little elaboration.

## 2020-10-20 NOTE — BH NOTES
During this shift (3pm-11pm) pt has been out in the milieu interacting appropriately with peers and members of staff. Pt is compliant with unit rules, medication regimen and direction from staff. Pt's mood is euthymic this shift and presents with a bright affect. Pt may often make random statements such as, \"I'm real.\" or \"Yeah, we gone get them. \" When staff asks pt what does she mean by this pt just repeats herself without divulging more information. Pt has not experienced a behavioral outburst this period and has not required redirection from staff at any time this evening. Staff will continue rounds monitoring pt for changes in behavior, location & safety.

## 2020-10-20 NOTE — PROGRESS NOTES
Problem: Suicide  Goal: *STG: Remains safe in hospital  Description: Pt will be free of harm each shift. Outcome: Progressing Towards Goal  Goal: *STG: Seeks staff when feelings of self harm or harm towards others arise  Description: Pt will contract for safety each shift. Outcome: Progressing Towards Goal  Goal: *STG: Attends activities and groups  Description: Pt will attend all groups each shift. Outcome: Progressing Towards Goal   Patient denies SI/AH. Her affect is flat , mood is \"ok\". She is attending art therapy group this morning. Voiced no complaints.

## 2020-10-20 NOTE — BSMART NOTE
10/20/20 Group Therapy Group Social work Attendance  attended Number of participants 2 Time in 3:55 Time out 4:29 Total Time 34 Interventions/techniques Informed and encouraged Interactions Pt. participated, provided feedback and accepted feedback

## 2020-10-20 NOTE — BSMART NOTE
OCCUPATIONAL THERAPY PROGRESS NOTE Group time:9538 Seen one to one, discussed changes she would like to make, somewhat vague and disorganized in presentation. Discussed not going back to where she was staying and said she was OK with the curfew, but not with not being allowed to go off by herself although she cited several times this has not ended well lately. Little insight and primarily concrete in thinking. States she can go to her grandmother's and can bang on the window and be let in. Says she cannot call anyone because all her phone numbers are on [employer]. Guarded at times, possibly some cognitive limitations.

## 2020-10-20 NOTE — PROGRESS NOTES
[address]  Inpatient Progress Note     Date of Service: 10/20/20  Hospital Day: 4     Subjective/Interval History   10/20/20    Treatment Team Notes:  Notes reviewed and/or discussed and report that Sanjiv Solorio is a 66-year-old female with history of schizoaffective disorder who was admitted for homicidal ideation towards roommate. Per nursing report, patient slept 7 hours last night. Mood was labile this morning. Patient interview: Sanjiv Solorio was interviewed by this writer today. Patient has euthymic mood today with bright affect. She states she is feeling better today. She feels he slept well last night. She denies suicidal or homicidal ideation but continues to endorse some auditory hallucinations. However she does not appear psychotic or responding to internal stimuli. She has also denied auditory hallucinations to other staff members. She states she is feeling paranoid in general but tries to ignore the paranoia. She feels that her treatment is going well so far and she is tolerating current medication regimen. Objective     Visit Vitals  /76 (BP 1 Location: Right arm, BP Patient Position: Sitting)   Pulse 79   Temp 98.4 °F (36.9 °C)   Resp 16   SpO2 99%       No results found for this or any previous visit (from the past 24 hour(s)). Mental Status Examination     Appearance/Hygiene 26 y.o.  BLACK OR  female  Hygiene: Fair   Behavior/Social Relatedness Appropriate   Musculoskeletal Gait/Station: appropriate  Tone (flaccid, cogwheeling, spastic): Normal  Psychomotor (hyperkinetic, hypokinetic): calm   Involuntary movements (tics, dyskinesias, akathisa, stereotypies): none   Speech   Rate, rhythm, volume, fluency and articulation are appropriate   Mood   depressed   Affect    restricted   Thought Process Linear and goal directed   Thought Content and Perceptual Disturbances Denies self-injurious behavior (SIB), suicidal ideation (SI),   Or HI     Sensorium and Cognition  alert and oriented x4, attention and concentration is intact, recent and remote memory is intact, language is fluent and within normal limits, fund of knowledge is appropriate. Insight  Limited   Judgment  Limited        Assessment/Plan      Psychiatric Diagnoses:   Patient Active Problem List   Diagnosis Code    Schizoaffective disorder, bipolar type ([facility] [address] F25.0    Suicidal ideation R45.851    Alcohol dependence ([facility] [address] F10.20       Martha Ivan is a 26 y.o. with a history of treatment noncompliance who is currently admitted for depression and homicidal ideation. 1.  Continue current medication regimen without changes. 2.  Disposition/Discharge Date: self-care/home, possible discharge Thursday.     Christopher Reeves MD DR. [facility]  Psychiatry

## 2020-10-21 PROCEDURE — 74011250637 HC RX REV CODE- 250/637: Performed by: PSYCHIATRY & NEUROLOGY

## 2020-10-21 PROCEDURE — 65220000003 HC RM SEMIPRIVATE PSYCH

## 2020-10-21 PROCEDURE — 99231 SBSQ HOSP IP/OBS SF/LOW 25: CPT | Performed by: PSYCHIATRY & NEUROLOGY

## 2020-10-21 RX ADMIN — TRAZODONE HYDROCHLORIDE 50 MG: 50 TABLET ORAL at 20:50

## 2020-10-21 RX ADMIN — SERTRALINE HYDROCHLORIDE 50 MG: 50 TABLET ORAL at 08:08

## 2020-10-21 RX ADMIN — LORAZEPAM 1 MG: 1 TABLET ORAL at 18:23

## 2020-10-21 RX ADMIN — QUETIAPINE FUMARATE 300 MG: 300 TABLET ORAL at 20:50

## 2020-10-21 NOTE — BSMART NOTE
OCCUPATIONAL THERAPY PROGRESS NOTE Group Time:  1430 Attendance: The patient attended full group. Participation: The patient participated fully in the activity. Attention: The patient needed redirection to activity at least once. [place] Interaction: The patient occasionally  interacts with others. The patient shows limited awareness of others. [place]. Affect bright. Limited in listening to others, occasionally distracted related to other staff on unit.

## 2020-10-21 NOTE — PROGRESS NOTES
[address] [facility] [address]  Inpatient Progress Note     Date of Service: 10/21/20  Hospital Day: 5     Subjective/Interval History   10/21/20    Treatment Team Notes:  Notes reviewed and/or discussed and report that Devon Coats is a 24-year-old female with history of schizoaffective disorder who was admitted for homicidal ideation towards roommate. Per nursing report, patient slept 7 hours last night. No behavioral issues per nursing report. Patient interview: Devon Coats was interviewed by this writer today. Patient has euthymic mood today with bright affect. She denies hallucinations, suicidal or homicidal ideations today. She is complaining of pain in her right wrist and says she usually wears a wrist brace to help with the pain. Supportive therapy provided. Patient states she is planning to go live with friends in [place] and no longer wants to receive support from [employer]. She states she is planning to go visit family in [place] for Thanksgiving. She is tolerating her medications well. Objective     Visit Vitals  /73 (BP 1 Location: Right arm, BP Patient Position: Sitting)   Pulse 90   Temp 97 °F (36.1 °C)   Resp 18   SpO2 100%       No results found for this or any previous visit (from the past 24 hour(s)). Mental Status Examination     Appearance/Hygiene 26 y.o.  BLACK OR  female  Hygiene: Fair   Behavior/Social Relatedness Appropriate   Musculoskeletal Gait/Station: appropriate  Tone (flaccid, cogwheeling, spastic): Normal  Psychomotor (hyperkinetic, hypokinetic): calm   Involuntary movements (tics, dyskinesias, akathisa, stereotypies): none   Speech   Rate, rhythm, volume, fluency and articulation are appropriate   Mood   depressed   Affect    restricted   Thought Process Linear and goal directed   Thought Content and Perceptual Disturbances Denies self-injurious behavior (SIB), suicidal ideation (SI),   Or HI     Sensorium and Cognition  alert and oriented x4, attention and concentration is intact, recent and remote memory is intact, language is fluent and within normal limits, fund of knowledge is appropriate. Insight  Limited   Judgment  Limited        Assessment/Plan      Psychiatric Diagnoses:   Patient Active Problem List   Diagnosis Code    Schizoaffective disorder, bipolar type ([facility] [address] F25.0    Suicidal ideation R45.851    Alcohol dependence ([facility] [address] F10.20       Sarah Recio is a 26 y.o. with a history of treatment noncompliance who is currently admitted for depression and homicidal ideation. 1.  Continue current medication regimen without changes. 2.  Disposition/Discharge Date: self-care/home,  discharge Thursday.     MD DR. [facility]  Psychiatry

## 2020-10-21 NOTE — GROUP NOTE
HORTENCIA  GROUP DOCUMENTATION INDIVIDUAL Group Therapy Note Date: 10/21/2020 Group Start Time: 1300 Group End Time: 9175 Group Topic: Social Work Group [facility] 1 ADULT CHEM DEP Yonathan Cuellar, [address] [facility] Group Therapy Note Attendees: 5 Attendance: Attended Interventions/techniques: Validated Follows Directions: Followed directions Interactions: Interacted appropriately Mental Status: Elevated Behavior/appearance: Cooperative Goals Achieved: Able to self-disclose Nely Cane

## 2020-10-21 NOTE — BH NOTES
The patient was monitored for safety. Affect was calm. Pt was very social with her peers and the staff. Pt attended all groups and activities. Pt talked with her DR and SW. Pt able to eat at all meals. Pt verbalized all issues. Staff will continue to monitor for safety and locations.

## 2020-10-21 NOTE — BH NOTES
During this shift (3pm-11pm) pt has been in the milieu interacting appropriately with peers and members of staff. Pt presents with a bright affect. Pt actively participated in groups held by staff this period. Pt states, \"I enjoy doing groups here they aren't boring and sometimes I learn something from it. \" Pt bizarre at times randomly stating, \"I can't get in trouble you know. It's cause I'm the [place]AKASH, I can kill police and get away with it no problem. \" Pt sometimes requires redirection for use of profane language and tone of voice in milieu. Pt is compliant with staff redirection. Pt has not experienced a behavioral outburst during this period. Staff will continue rounds monitoring pt for changes in behavior, location & safety.

## 2020-10-21 NOTE — GROUP NOTE
HORTENCIA  GROUP DOCUMENTATION INDIVIDUAL Group Therapy Note Date: 10/21/2020 Group Start Time: 0900 Group End Time: 8508 Group Topic: Nursing [facility] 1 SPECIAL TRTMT 1 ALTON [place]  GROUP DOCUMENTATION GROUP Group Therapy Note Attendees: 4 Attendance: Did not attend Veronica Herrera RN

## 2020-10-21 NOTE — BSMART NOTE
ART THERAPY GROUP PROGRESS NOTE PATIENT SCHEDULED FOR GROUP AT: 4869 ATTENDANCE: Full PARTICIPATION LEVEL:  Participates fully in the art process ATTENTION LEVEL : Able to focus on task FOCUS: Positive affirmations SYMBOLIC & THEMATIC CONTENT AS NOTED IN IMAGERY: She was cheerful and presented with a bright affect. She was invested in the task at hand. She is child-like at times and often seeks reassurance.

## 2020-10-21 NOTE — BSMART NOTE
Clinical Summary:  Paula Mcqueen is a 42-year-old female with history of schizoaffective disorder who was admitted for homicidal ideation towards roommate. SW made contact with patient as she remained isolated in her room. Patient is engaged in interview and reports she is prepared for discharge tomorrow. Patient reports she will reside with her grandmother and will continue services with [facility] and will return to the [address] [place] [address]. SW contacted and left message for  Mr. Blaire Caban ([phone]) with [facility] to retrieve patients belongings. SW will continue supportive counseling and will assist with discharge.  
 
Delmis Vigil, BRO-E

## 2020-10-22 VITALS
SYSTOLIC BLOOD PRESSURE: 133 MMHG | DIASTOLIC BLOOD PRESSURE: 85 MMHG | RESPIRATION RATE: 16 BRPM | OXYGEN SATURATION: 100 % | HEART RATE: 100 BPM | TEMPERATURE: 98.7 F

## 2020-10-22 PROCEDURE — 74011250637 HC RX REV CODE- 250/637: Performed by: PSYCHIATRY & NEUROLOGY

## 2020-10-22 PROCEDURE — 99239 HOSP IP/OBS DSCHRG MGMT >30: CPT | Performed by: PSYCHIATRY & NEUROLOGY

## 2020-10-22 RX ORDER — SERTRALINE HYDROCHLORIDE 50 MG/1
50 TABLET, FILM COATED ORAL DAILY
Qty: 30 TAB | Refills: 0 | Status: SHIPPED | OUTPATIENT
Start: 2020-10-23 | End: 2021-01-07

## 2020-10-22 RX ORDER — QUETIAPINE FUMARATE 300 MG/1
300 TABLET, FILM COATED ORAL
Qty: 30 TAB | Refills: 0 | Status: ON HOLD | OUTPATIENT
Start: 2020-10-22 | End: 2021-01-07 | Stop reason: SDUPTHER

## 2020-10-22 RX ORDER — TRAZODONE HYDROCHLORIDE 100 MG/1
100 TABLET ORAL
Qty: 30 TAB | Refills: 0 | Status: SHIPPED | OUTPATIENT
Start: 2020-10-22 | End: 2021-01-07

## 2020-10-22 RX ADMIN — SERTRALINE HYDROCHLORIDE 50 MG: 50 TABLET ORAL at 08:06

## 2020-10-22 NOTE — DISCHARGE INSTRUCTIONS
BEHAVIORAL HEALTH NURSING DISCHARGE NOTE      The following personal items collected during your admission are returned to you:   Dental Appliance: Dental Appliances: None  Vision: Visual Aid: None  Hearing Aid:    Jewelry: Jewelry: None  Clothing: Clothing: Pants, Shirt, Socks(blue jeans, grey t-shirt, flip flops, black socks, hand brac)  Other Valuables: Other Valuables: None  Valuables sent to safe:        PATIENT INSTRUCTIONS:             The discharge information has been reviewed with the patient. The patient verbalized understanding.         Patient armband removed and shredded

## 2020-10-22 NOTE — PROGRESS NOTES
Pt has spent the majority of the shift in the room. Pt came to nursing station complaining of auditory hallucinations and racing thoughts. Pt given Ativan 1mg. Pt requesting Ativan again at bedtime. Informed pt it was too early. Pt compliant with medications. Staff will continue to monitor pt for safety and provide a safe, supportive/therapeutic environment.
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
## 2021-01-02 ENCOUNTER — HOSPITAL ENCOUNTER (INPATIENT)
Age: 31
LOS: 3 days | Discharge: HOME | DRG: 750 | End: 2021-01-07
Attending: EMERGENCY MEDICINE | Admitting: PSYCHIATRY & NEUROLOGY
Payer: MEDICAID

## 2021-01-02 DIAGNOSIS — R45.850 HOMICIDAL IDEATIONS: Primary | ICD-10-CM

## 2021-01-02 DIAGNOSIS — F32.A DEPRESSION, UNSPECIFIED DEPRESSION TYPE: ICD-10-CM

## 2021-01-02 LAB
ALBUMIN SERPL-MCNC: 3.4 G/DL (ref 3.4–5)
ALBUMIN/GLOB SERPL: 0.7 {RATIO} (ref 0.8–1.7)
ALP SERPL-CCNC: 99 U/L (ref 45–117)
ALT SERPL-CCNC: 29 U/L (ref 13–56)
AMPHET UR QL SCN: NEGATIVE
ANION GAP SERPL CALC-SCNC: 6 MMOL/L (ref 3–18)
AST SERPL-CCNC: 7 U/L (ref 10–38)
BARBITURATES UR QL SCN: NEGATIVE
BASOPHILS # BLD: 0 K/UL (ref 0–0.1)
BASOPHILS NFR BLD: 0 % (ref 0–2)
BENZODIAZ UR QL: NEGATIVE
BILIRUB SERPL-MCNC: 0.2 MG/DL (ref 0.2–1)
BUN SERPL-MCNC: 10 MG/DL (ref 7–18)
BUN/CREAT SERPL: 11 (ref 12–20)
CALCIUM SERPL-MCNC: 8.8 MG/DL (ref 8.5–10.1)
CANNABINOIDS UR QL SCN: POSITIVE
CHLORIDE SERPL-SCNC: 107 MMOL/L (ref 100–111)
CO2 SERPL-SCNC: 26 MMOL/L (ref 21–32)
COCAINE UR QL SCN: POSITIVE
COVID-19 RAPID TEST, COVR: NOT DETECTED
CREAT SERPL-MCNC: 0.92 MG/DL (ref 0.6–1.3)
DIFFERENTIAL METHOD BLD: ABNORMAL
EOSINOPHIL # BLD: 0.2 K/UL (ref 0–0.4)
EOSINOPHIL NFR BLD: 2 % (ref 0–5)
ERYTHROCYTE [DISTWIDTH] IN BLOOD BY AUTOMATED COUNT: 16.6 % (ref 11.6–14.5)
ETHANOL SERPL-MCNC: <3 MG/DL (ref 0–3)
GLOBULIN SER CALC-MCNC: 4.8 G/DL (ref 2–4)
GLUCOSE SERPL-MCNC: 103 MG/DL (ref 74–99)
HCG UR QL: NEGATIVE
HCT VFR BLD AUTO: 31.9 % (ref 35–45)
HDSCOM,HDSCOM: ABNORMAL
HGB BLD-MCNC: 10.1 G/DL (ref 12–16)
LYMPHOCYTES # BLD: 2.6 K/UL (ref 0.9–3.6)
LYMPHOCYTES NFR BLD: 34 % (ref 21–52)
MCH RBC QN AUTO: 25.1 PG (ref 24–34)
MCHC RBC AUTO-ENTMCNC: 31.7 G/DL (ref 31–37)
MCV RBC AUTO: 79.4 FL (ref 74–97)
METHADONE UR QL: NEGATIVE
MONOCYTES # BLD: 0.4 K/UL (ref 0.05–1.2)
MONOCYTES NFR BLD: 6 % (ref 3–10)
NEUTS SEG # BLD: 4.6 K/UL (ref 1.8–8)
NEUTS SEG NFR BLD: 58 % (ref 40–73)
OPIATES UR QL: NEGATIVE
PCP UR QL: NEGATIVE
PLATELET # BLD AUTO: 405 K/UL (ref 135–420)
PMV BLD AUTO: 9.1 FL (ref 9.2–11.8)
POTASSIUM SERPL-SCNC: 3.5 MMOL/L (ref 3.5–5.5)
PROT SERPL-MCNC: 8.2 G/DL (ref 6.4–8.2)
RBC # BLD AUTO: 4.02 M/UL (ref 4.2–5.3)
SODIUM SERPL-SCNC: 139 MMOL/L (ref 136–145)
SOURCE, COVRS: NORMAL
SPECIMEN TYPE, XMCV1T: NORMAL
WBC # BLD AUTO: 7.9 K/UL (ref 4.6–13.2)

## 2021-01-02 PROCEDURE — 87635 SARS-COV-2 COVID-19 AMP PRB: CPT

## 2021-01-02 PROCEDURE — 99284 EMERGENCY DEPT VISIT MOD MDM: CPT

## 2021-01-02 PROCEDURE — 80307 DRUG TEST PRSMV CHEM ANLYZR: CPT

## 2021-01-02 PROCEDURE — 80053 COMPREHEN METABOLIC PANEL: CPT

## 2021-01-02 PROCEDURE — 82077 ASSAY SPEC XCP UR&BREATH IA: CPT

## 2021-01-02 PROCEDURE — 99285 EMERGENCY DEPT VISIT HI MDM: CPT

## 2021-01-02 PROCEDURE — 81025 URINE PREGNANCY TEST: CPT

## 2021-01-02 PROCEDURE — 85025 COMPLETE CBC W/AUTO DIFF WBC: CPT

## 2021-01-03 PROCEDURE — 74011250637 HC RX REV CODE- 250/637: Performed by: EMERGENCY MEDICINE

## 2021-01-03 RX ORDER — SERTRALINE HYDROCHLORIDE 50 MG/1
100 TABLET, FILM COATED ORAL DAILY
Status: DISCONTINUED | OUTPATIENT
Start: 2021-01-04 | End: 2021-01-04

## 2021-01-03 RX ORDER — TRAZODONE HYDROCHLORIDE 50 MG/1
50 TABLET ORAL
Status: COMPLETED | OUTPATIENT
Start: 2021-01-03 | End: 2021-01-03

## 2021-01-03 RX ORDER — QUETIAPINE FUMARATE 25 MG/1
100 TABLET, FILM COATED ORAL
Status: DISCONTINUED | OUTPATIENT
Start: 2021-01-03 | End: 2021-01-04 | Stop reason: SDUPTHER

## 2021-01-03 RX ORDER — LORAZEPAM 1 MG/1
2 TABLET ORAL
Status: DISCONTINUED | OUTPATIENT
Start: 2021-01-03 | End: 2021-01-07 | Stop reason: HOSPADM

## 2021-01-03 RX ORDER — TRAZODONE HYDROCHLORIDE 50 MG/1
100 TABLET ORAL
Status: DISCONTINUED | OUTPATIENT
Start: 2021-01-03 | End: 2021-01-04 | Stop reason: ALTCHOICE

## 2021-01-03 RX ADMIN — QUETIAPINE FUMARATE 100 MG: 25 TABLET ORAL at 21:46

## 2021-01-03 RX ADMIN — LORAZEPAM 2 MG: 1 TABLET ORAL at 21:46

## 2021-01-03 RX ADMIN — TRAZODONE HYDROCHLORIDE 50 MG: 50 TABLET ORAL at 16:54

## 2021-01-03 RX ADMIN — TRAZODONE HYDROCHLORIDE 100 MG: 50 TABLET ORAL at 21:47

## 2021-01-03 NOTE — ED NOTES
Patients belongings placed in [facility] [address] [facility]

## 2021-01-03 NOTE — ED NOTES
Pt suddenly raises up, yells out you touch cola I will kill you\"  When I questioned,  Pt states I am not talking to you,  Laid down and started sucking thumb.

## 2021-01-03 NOTE — ED PROVIDER NOTES
EMERGENCY DEPARTMENT HISTORY AND PHYSICAL EXAM    8:21 PM      Date: 1/2/2021  Patient Name: Yulisa Hays    History of Presenting Illness     Chief Complaint   Patient presents with   [address] Problem         History Provided By: Patient    Additional History (Context): Yulisa Hays is a 26 y.o. female with schizoaffective disorder who presents with depression and homicidal ideation. Patient states she has not taken her depression medication. She states she wants to kill people. People she wants to kill her and her head. She denies any chest pain, fever, nausea, vomiting or diarrhea. Patient smokes 1 pack/day. She admits to drinking 3 to 4 days/week. Patient admits to marijuana cocaine use. PCP: None      Current Outpatient Medications   Medication Sig Dispense Refill    QUEtiapine (SEROquel) 300 mg tablet Take 1 Tab by mouth nightly. Indications: schizoaffective disorder 30 Tab 0    traZODone (DESYREL) 100 mg tablet Take 1 Tab by mouth nightly as needed for Sleep. Indications: insomnia associated with depression 30 Tab 0    sertraline (ZOLOFT) 50 mg tablet Take 1 Tab by mouth daily. Indications: schizoaffective disorder 30 Tab 0       Past History     Past Medical History:  Past Medical History:   Diagnosis Date    Schizoaffective disorder [facility])        Past Surgical History:  History reviewed. No pertinent surgical history. Family History:  History reviewed. No pertinent family history. Social History:  Social History     Tobacco Use    Smoking status: Never Smoker    Smokeless tobacco: Never Used   Substance Use Topics    Alcohol use: Not Currently    Drug use: Never       Allergies:  No Known Allergies      Review of Systems       Review of Systems   Constitutional: Negative. Negative for chills, diaphoresis and fever. HENT: Negative. Negative for congestion, rhinorrhea and sore throat. Eyes: Negative. Negative for pain, discharge and redness.    Respiratory: Negative. Negative for cough, chest tightness, shortness of breath and wheezing. Cardiovascular: Negative. Negative for chest pain. Gastrointestinal: Negative. Negative for abdominal pain, constipation, diarrhea, nausea and vomiting. Genitourinary: Negative. Negative for dysuria, flank pain, frequency, hematuria and urgency. Musculoskeletal: Negative. Negative for back pain and neck pain. Skin: Negative. Negative for rash. Neurological: Negative. Negative for syncope, weakness, numbness and headaches. Psychiatric/Behavioral: Positive for suicidal ideas. All other systems reviewed and are negative. Physical Exam     Visit Vitals  /74 (BP 1 Location: Right arm, BP Patient Position: At rest)   Temp 98.9 °F (37.2 °C)   Resp 16   Wt 96.6 kg (213 lb)   LMP  (LMP Unknown)   SpO2 94%   BMI 33.36 kg/m²         Physical Exam  Vitals signs and nursing note reviewed. Constitutional:       General: She is not in acute distress. Appearance: Normal appearance. She is well-developed. She is not ill-appearing, toxic-appearing or diaphoretic. HENT:      Head: Normocephalic and atraumatic. Mouth/Throat:      Pharynx: No oropharyngeal exudate. Eyes:      General: No scleral icterus. Conjunctiva/sclera: Conjunctivae normal.      Pupils: Pupils are equal, round, and reactive to light. Neck:      Musculoskeletal: Normal range of motion and neck supple. Thyroid: No thyromegaly. Vascular: No hepatojugular reflux or JVD. Trachea: No tracheal deviation. Cardiovascular:      Rate and Rhythm: Normal rate and regular rhythm. Pulses: Normal pulses. Radial pulses are 2+ on the right side and 2+ on the left side. Dorsalis pedis pulses are 2+ on the right side and 2+ on the left side. Heart sounds: Normal heart sounds, S1 normal and S2 normal. No murmur. No gallop. No S3 or S4 sounds.     Pulmonary:      Effort: Pulmonary effort is normal. No respiratory distress. Breath sounds: Normal breath sounds. No decreased breath sounds, wheezing, rhonchi or rales. Abdominal:      General: Bowel sounds are normal. There is no distension. Palpations: Abdomen is soft. Abdomen is not rigid. There is no mass. Tenderness: There is no abdominal tenderness. There is no guarding or rebound. Negative signs include Montez's sign and McBurney's sign. Musculoskeletal: Normal range of motion. Lymphadenopathy:      Head:      Right side of head: No submental, submandibular, preauricular or occipital adenopathy. Left side of head: No submental, submandibular, preauricular or occipital adenopathy. Cervical: No cervical adenopathy. Upper Body:      Right upper body: No supraclavicular adenopathy. Left upper body: No supraclavicular adenopathy. Skin:     General: Skin is warm and dry. Findings: No rash. Neurological:      Mental Status: She is alert. She is not disoriented. GCS: GCS eye subscore is 4. GCS verbal subscore is 5. GCS motor subscore is 6. Cranial Nerves: No cranial nerve deficit. Sensory: No sensory deficit. Coordination: Coordination normal.      Gait: Gait normal.      Deep Tendon Reflexes: Reflexes are normal and symmetric. Psychiatric:         Speech: Speech normal.         Thought Content: Thought content includes homicidal ideation. Thought content does not include suicidal ideation. Thought content does not include homicidal or suicidal plan.            Diagnostic Study Results     Labs -  Recent Results (from the past 12 hour(s))   CBC WITH AUTOMATED DIFF    Collection Time: 01/02/21  8:50 PM   Result Value Ref Range    WBC 7.9 4.6 - 13.2 K/uL    RBC 4.02 (L) 4.20 - 5.30 M/uL    HGB 10.1 (L) 12.0 - 16.0 g/dL    HCT 31.9 (L) 35.0 - 45.0 %    MCV 79.4 74.0 - 97.0 FL    MCH 25.1 24.0 - 34.0 PG    MCHC 31.7 31.0 - 37.0 g/dL    RDW 16.6 (H) 11.6 - 14.5 %    PLATELET 911 184 - 424 K/uL    MPV 9.1 (L) 9.2 - 11.8 FL    NEUTROPHILS 58 40 - 73 %    LYMPHOCYTES 34 21 - 52 %    MONOCYTES 6 3 - 10 %    EOSINOPHILS 2 0 - 5 %    BASOPHILS 0 0 - 2 %    ABS. NEUTROPHILS 4.6 1.8 - 8.0 K/UL    ABS. LYMPHOCYTES 2.6 0.9 - 3.6 K/UL    ABS. MONOCYTES 0.4 0.05 - 1.2 K/UL    ABS. EOSINOPHILS 0.2 0.0 - 0.4 K/UL    ABS. BASOPHILS 0.0 0.0 - 0.1 K/UL    DF AUTOMATED     METABOLIC PANEL, COMPREHENSIVE    Collection Time: 01/02/21  8:50 PM   Result Value Ref Range    Sodium 139 136 - 145 mmol/L    Potassium 3.5 3.5 - 5.5 mmol/L    Chloride 107 100 - 111 mmol/L    CO2 26 21 - 32 mmol/L    Anion gap 6 3.0 - 18 mmol/L    Glucose 103 (H) 74 - 99 mg/dL    BUN 10 7.0 - 18 MG/DL    Creatinine 0.92 0.6 - 1.3 MG/DL    BUN/Creatinine ratio 11 (L) 12 - 20      GFR est AA >60 >60 ml/min/1.73m2    GFR est non-AA >60 >60 ml/min/1.73m2    Calcium 8.8 8.5 - 10.1 MG/DL    Bilirubin, total 0.2 0.2 - 1.0 MG/DL    ALT (SGPT) 29 13 - 56 U/L    AST (SGOT) 7 (L) 10 - 38 U/L    Alk.  phosphatase 99 45 - 117 U/L    Protein, total 8.2 6.4 - 8.2 g/dL    Albumin 3.4 3.4 - 5.0 g/dL    Globulin 4.8 (H) 2.0 - 4.0 g/dL    A-G Ratio 0.7 (L) 0.8 - 1.7     ETHYL ALCOHOL    Collection Time: 01/02/21  8:50 PM   Result Value Ref Range    ALCOHOL(ETHYL),SERUM <3 0 - 3 MG/DL   HCG URINE, QL    Collection Time: 01/02/21  9:30 PM   Result Value Ref Range    HCG urine, QL Negative NEG     DRUG SCREEN, URINE    Collection Time: 01/02/21  9:30 PM   Result Value Ref Range    BENZODIAZEPINES Negative NEG      BARBITURATES Negative NEG      THC (TH-CANNABINOL) Positive (A) NEG      OPIATES Negative NEG      PCP(PHENCYCLIDINE) Negative NEG      COCAINE Positive (A) NEG      AMPHETAMINES Negative NEG      METHADONE Negative NEG      HDSCOM (NOTE)    SARS-COV-2    Collection Time: 01/02/21 11:13 PM   Result Value Ref Range    Specimen source Nasopharyngeal      COVID-19 rapid test Not detected NOTD      Specimen type NP Swab         Radiologic Studies -   No orders to display Medical Decision Making   Provider Notes (Medical Decision Making):  MDM  Number of Diagnoses or Management Options  Diagnosis management comments: Homicidal ideation   Depression       I am the first provider for this patient. I reviewed the vital signs, available nursing notes, past medical history, past surgical history, family history and social history. Vital Signs-Reviewed the patient's vital signs. Records Reviewed: Nursing Notes (Time of Review: 8:21 PM)    ED Course: Progress Notes, Reevaluation, and Consults:    Labs essentially normal with the exception of hemoglobin of 10.1. UDS positive for cocaine and THC. Alcohol negative. Covid negative. 10:21 PM 1/2/2021    Consult:  Discussed care with [facility] [employer]. Crisis Stabilization. Standard discussion; including history of patients chief complaint, available diagnostic results, and treatment course. Agrees with admission. Will begin looking for placement for crisis stabilization. 3:45 AM    7;01 AM Turned over to Dr. Eva Whittington for final disposition. Diagnosis         Clinical Impression:   1. Homicidal ideations    2. Depression, unspecified depression type        Disposition:       Attestation        Provider Attestation:     I personally performed the services described in the documentation, reviewed the documentation and it accurately and completely records my words and actions utilizing the [employer] January 03, 2021 at 3:46 AM - [facility]    Disclaimer. It is dictated using utilizing voice recognition software. Unfortunately this leads to occasional typographical errors. I apologize in advance if the situation occurs. If questions arise please do not hesitate to contact me or call our department.

## 2021-01-03 NOTE — ED NOTES
Pt vitals taken;  Pt asked me if I am trying to kill her (reassured her I am helping get her vitals). Pt stated she takes home meds Seroquel and trazodone; Pt requested receiving these two meds.

## 2021-01-03 NOTE — BSMART NOTE
Crisis Note: Spoke with Piedad Ervin/Patient Access, [phone], who informed this writer that clinicals are still being reviewed for possible admission. Per [facility], [phone], patient has been declined admission at the [facility].

## 2021-01-03 NOTE — ED NOTES
I asked patient why she is here,  She states I am homicidal. I asked her if there was someone specific, she said no just the voices in her head

## 2021-01-03 NOTE — BSMART NOTE
Comprehensive Assessment Integrated Summary Patient is a 26 y.o. female with Schizoaffective Disorder who presents with depression and homicidal ideation. Patient stated that she is homicidal towards a \"woman in my head. ..people in my head. ..the woman following me. ..everywhere I go. Uzma Money Uzma Money I see her face. I'm seeing spirits, too. \" Patient denied auditory/command auditory hallucinations. Patient added that she having suicidal thoughts because she has been going through a lot of things. \"My aunt . Uzma Money Uzma Money I can't visit my kids, and I got financial problems. \" Patient denied a plan to harm herself. Patient stated that she has been snorting cocaine, \"$40.00 worth, last snorted cocaine about 2 days, ago, and only snorts cocaine 1-2 times/month. \" Patient verbalized that she smokes marijuana everyday, \"2-3 blunts of marijuana/day. \" The patient's appearance, dressed in hospital attire. The patient's behavior calm, cooperative. The patient is oriented to time, place, person and situation. The patient's speech shows no evidence of impairment. The patient's mood is depressed. The range of affect is flat. The patient's thought content demonstrates no evidence of impairment and paranoia. The thought process shows no evidence of impairment. The patient's perception demonstrated changes in the following:  Visual, \"see spirits. \" The patient's memory shows no evidence of impairment. The patient's appetite shows no evidence of impairment. The patient's sleep shows no evidence of impairment. Access to weapons: Denied Substance Abuse: See above Outpatient Care: \"None\" Inpatient Services: \"[facility] [place],\" multiple psychiatric inpatient admissions. Contact/Support Person: \"None\" Disposition Patient is receptive to voluntary admission at [facility], [facility], [facility], [facility] or [address] [place] [address] facilities. Bed search initiated d/t there are no available beds at [facility]. [facility], [phone] submitted for review. [phone], awaiting return call. [facility]-Patient does not want this facility. [facility] -No female beds. [facility] -Spoke with Health system, [phone], No Acute beds. [facility]- spoke with [facility], [phone], awaiting response.  
 
David Youssef, RN, BSN

## 2021-01-04 PROBLEM — F25.9 SCHIZOAFFECTIVE DISORDER (HCC): Status: ACTIVE | Noted: 2021-01-04

## 2021-01-04 PROCEDURE — 65220000003 HC RM SEMIPRIVATE PSYCH

## 2021-01-04 PROCEDURE — 74011250637 HC RX REV CODE- 250/637: Performed by: PSYCHIATRY & NEUROLOGY

## 2021-01-04 RX ORDER — HYDROXYZINE PAMOATE 50 MG/1
50 CAPSULE ORAL
Status: DISCONTINUED | OUTPATIENT
Start: 2021-01-04 | End: 2021-01-07 | Stop reason: HOSPADM

## 2021-01-04 RX ORDER — HALOPERIDOL 5 MG/1
5 TABLET ORAL
Status: DISCONTINUED | OUTPATIENT
Start: 2021-01-04 | End: 2021-01-04

## 2021-01-04 RX ORDER — TRAZODONE HYDROCHLORIDE 50 MG/1
50 TABLET ORAL
Status: DISCONTINUED | OUTPATIENT
Start: 2021-01-04 | End: 2021-01-07 | Stop reason: HOSPADM

## 2021-01-04 RX ORDER — VENLAFAXINE 50 MG/1
150 TABLET ORAL DAILY
Status: DISCONTINUED | OUTPATIENT
Start: 2021-01-04 | End: 2021-01-04 | Stop reason: SDUPTHER

## 2021-01-04 RX ORDER — LORAZEPAM 2 MG/ML
1 INJECTION INTRAMUSCULAR
Status: DISCONTINUED | OUTPATIENT
Start: 2021-01-04 | End: 2021-01-07 | Stop reason: HOSPADM

## 2021-01-04 RX ORDER — LORAZEPAM 1 MG/1
1 TABLET ORAL
Status: DISCONTINUED | OUTPATIENT
Start: 2021-01-04 | End: 2021-01-07 | Stop reason: HOSPADM

## 2021-01-04 RX ORDER — PRAZOSIN HYDROCHLORIDE 1 MG/1
1 CAPSULE ORAL
Status: DISCONTINUED | OUTPATIENT
Start: 2021-01-04 | End: 2021-01-06

## 2021-01-04 RX ORDER — VENLAFAXINE HYDROCHLORIDE 37.5 MG/1
150 CAPSULE, EXTENDED RELEASE ORAL
Status: DISCONTINUED | OUTPATIENT
Start: 2021-01-05 | End: 2021-01-07 | Stop reason: HOSPADM

## 2021-01-04 RX ORDER — IBUPROFEN 600 MG/1
600 TABLET ORAL
Status: DISCONTINUED | OUTPATIENT
Start: 2021-01-04 | End: 2021-01-07 | Stop reason: HOSPADM

## 2021-01-04 RX ORDER — BENZTROPINE MESYLATE 1 MG/ML
1 INJECTION INTRAMUSCULAR; INTRAVENOUS
Status: DISCONTINUED | OUTPATIENT
Start: 2021-01-04 | End: 2021-01-07 | Stop reason: HOSPADM

## 2021-01-04 RX ORDER — HALOPERIDOL 5 MG/ML
5 INJECTION INTRAMUSCULAR
Status: DISCONTINUED | OUTPATIENT
Start: 2021-01-04 | End: 2021-01-07 | Stop reason: HOSPADM

## 2021-01-04 RX ORDER — BENZTROPINE MESYLATE 1 MG/1
1 TABLET ORAL
Status: DISCONTINUED | OUTPATIENT
Start: 2021-01-04 | End: 2021-01-07 | Stop reason: HOSPADM

## 2021-01-04 RX ORDER — HALOPERIDOL 5 MG/ML
5 INJECTION INTRAMUSCULAR
Status: DISCONTINUED | OUTPATIENT
Start: 2021-01-04 | End: 2021-01-04

## 2021-01-04 RX ORDER — HALOPERIDOL 5 MG/1
5 TABLET ORAL
Status: DISCONTINUED | OUTPATIENT
Start: 2021-01-04 | End: 2021-01-07 | Stop reason: HOSPADM

## 2021-01-04 RX ORDER — QUETIAPINE FUMARATE 300 MG/1
300 TABLET, FILM COATED ORAL
Status: DISCONTINUED | OUTPATIENT
Start: 2021-01-04 | End: 2021-01-07 | Stop reason: HOSPADM

## 2021-01-04 RX ADMIN — QUETIAPINE FUMARATE 300 MG: 300 TABLET ORAL at 20:17

## 2021-01-04 RX ADMIN — TRAZODONE HYDROCHLORIDE 50 MG: 50 TABLET ORAL at 20:17

## 2021-01-04 NOTE — BSMART NOTE
7am-7pm shift with continued bed search. Client has been declined or facilities at capacity. Loud, demanding, profnadira while observed in hallway of ED. Continues to state that she wants to be admitted and needs her medication.

## 2021-01-04 NOTE — ROUTINE PROCESS
TRANSFER - OUT REPORT: 
 
Verbal report given Nicole RN(name) on Maged Robin  being transferred to [facility] (unit) for routine progression of care Report consisted of patients Situation, Background, Assessment and  
Recommendations(SBAR). Information from the following report(s) SBAR and ED Summary was reviewed with the receiving nurse. Lines:    
 
Opportunity for questions and clarification was provided. Patient transported with: 
 [employer]

## 2021-01-04 NOTE — PROGRESS NOTES
Patient admitted at this time from ER. Patient admitted with complaints of SI/HI , and auditory hallucinations. Patient arrived to the unit very loud and demanding. Answer questions briefly stating \" Can't you look at my papers instead of me answering you again, I want to be left alone'. Patient ate another meal. She was oriented to unit and guidelines. RN will initiate,develop, implement, review or revise treatment plan.

## 2021-01-04 NOTE — ED NOTES
The patient was turned over to me by Dr. Sloan Fuentes. She is a 78-year-old woman with a history of schizoaffective disorder. She presents with depression and homicidal ideation. She is displaying increasing agitation in the ED. She is threatening to leave. I have ordered her regular home psych medications and ativan 2 mg po every 4 hours prn agitation. We are consulting crisis for further recommendations.

## 2021-01-04 NOTE — ED NOTES
Patient turned over to me Dr. Lynsey Sofia 78-year-old female pending placement.   Currently suicidal..  Prior chart review from [place] she is on  Seroquel 300 nightly prazosin 1 mg nightly and Effexor 150 daily

## 2021-01-04 NOTE — H&P
Psychiatry History and Physical    Subjective:     Date of Evaluation:  1/4/2021    Reason for Referral:  Mayank Jean was referred to the examiners from ED for HI with intermittent SI. History of Presenting Problem: 27 yo AA female, in NAD, well developed and nourished, with a hx of Schizoaffective disorder presented to the ED for HI, intermittent SI, and depression. She does not have a plan to attempt suicide. When asked if she has a plan for Homicide, States that \"there are many ways to kill people and I'm just going to say no\". States that she has HI towards \"killing people in my head\". She endorses VH and AH. Hx of marijuana and cocaine use, states that she does not \"use a lot\". She gets her psychiatric care via the ER per patient she is on Seroquel, Effexor, and Trazodone for at least the past year. She has a hx of overdose and cutting behavior in the past.     Patient Active Problem List    Diagnosis Date Noted    Schizoaffective disorder ([facility] 01/04/2021    Suicidal ideation 09/11/2020    Alcohol dependence ([facility] 09/11/2020    Schizoaffective disorder, bipolar type ([facility] 12/23/2019     Past Medical History:   Diagnosis Date    Schizoaffective disorder ([facility]      History reviewed. No pertinent surgical history. History reviewed. No pertinent family history. Social History     Tobacco Use    Smoking status: Never Smoker    Smokeless tobacco: Never Used   Substance Use Topics    Alcohol use: Not Currently     Prior to Admission medications    Medication Sig Start Date End Date Taking? Authorizing Provider   QUEtiapine (SEROquel) 300 mg tablet Take 1 Tab by mouth nightly. Indications: schizoaffective disorder 10/22/20   Ayafor, Sherryle Purl, MD   traZODone (DESYREL) 100 mg tablet Take 1 Tab by mouth nightly as needed for Sleep. Indications: insomnia associated with depression 10/22/20   Ayafor, Sherryle Purl, MD   sertraline (ZOLOFT) 50 mg tablet Take 1 Tab by mouth daily.  Indications: schizoaffective disorder 10/23/20   Alysha Craig MD     No Known Allergies     Review of Systems - History obtained from chart review and the patient  General ROS: negative  Psychological ROS: positive for - depression, hallucinations and suicidal ideation  Ophthalmic ROS: negative  ENT ROS: negative  Allergy and Immunology ROS: negative  Hematological and Lymphatic ROS: negative  Endocrine ROS: negative  Respiratory ROS: no cough, shortness of breath, or wheezing  Cardiovascular ROS: no chest pain or dyspnea on exertion  Gastrointestinal ROS: no abdominal pain, change in bowel habits, or black or bloody stools  Genito-Urinary ROS: no dysuria, trouble voiding, or hematuria  Musculoskeletal ROS: negative  Neurological ROS: no TIA or stroke symptoms  Dermatological ROS: negative      Objective:     Patient Vitals for the past 8 hrs:   BP Temp Pulse Resp SpO2   01/04/21 1341 137/89 98.4 °F (36.9 °C) 86 16    01/04/21 1312 100/70 98.3 °F (36.8 °C) 82 16 100 %       Mental Status exam: WNL except for    Sensorium  Alert and Oriented x 2   Orientation person and place   Relations vague   Eye Contact intense   Appearance:  disheveled   Motor Behavior:  within normal limits   Speech:  normal pitch, normal volume and non-pressured   Vocabulary below average   Thought Process: illogical   Thought Content hallucinations   Suicidal ideations no plan  and no intention   Homicidal ideations plan and intention   Mood:  stable   Affect:  flat   Memory recent  adequate   Memory remote:  adequate   Concentration:  adequate   Abstraction:  abstract   Insight:  fair   Reliability fair   Judgment:  fair         Physical Exam:   Visit Vitals  /89 (BP 1 Location: Right arm, BP Patient Position: Sitting)   Pulse 86   Temp 98.4 °F (36.9 °C)   Resp 16   Wt 96.6 kg (213 lb)   LMP  (LMP Unknown)   SpO2 100%   Breastfeeding No   BMI 33.36 kg/m²     General appearance: alert, cooperative, no distress, appears stated age  Head: Normocephalic, without obvious abnormality, atraumatic  Eyes: negative  Ears: normal TM's and external ear canals AU  Nose: Nares normal. Septum midline. Mucosa normal. No drainage or sinus tenderness. Throat: Lips, mucosa, and tongue normal. Teeth and gums normal  Neck: supple, symmetrical, trachea midline, no adenopathy, thyroid: not enlarged, symmetric, no tenderness/mass/nodules and no JVD  Lungs: clear to auscultation bilaterally  Heart: regular rate and rhythm, S1, S2 normal, no murmur, click, rub or gallop  Abdomen: soft, non-tender. Bowel sounds normal. No masses,  no organomegaly  Extremities: extremities normal, atraumatic, no cyanosis or edema  Pulses: 2+ and symmetric  Skin: Skin color, texture, turgor normal. No rashes or lesions  Lymph nodes: Cervical, supraclavicular, and axillary nodes normal.  Neurologic: Grossly normal        Impression:      Active Problems:    Schizoaffective disorder ([facility] (1/4/2021)          Plan:     Recommendations for Treatment/Conditions:  Psychiatric treatment recommended while in hospital  Admit to behavioral health for Schizoaffective, HI, and intermittent SI. Referral To:    Inpatient psychiatric care      [place], [place]   1/4/2021 6:04 PM

## 2021-01-04 NOTE — BSMART NOTE
OCCUPATIONAL THERAPY PROGRESS NOTE Group time:1400 The patient was not disturbed for group at staff discretion. In bed, recently admitted.

## 2021-01-05 PROBLEM — F25.9 SCHIZOAFFECTIVE DISORDER (HCC): Status: RESOLVED | Noted: 2021-01-04 | Resolved: 2021-01-05

## 2021-01-05 PROCEDURE — 65220000003 HC RM SEMIPRIVATE PSYCH

## 2021-01-05 PROCEDURE — 99223 1ST HOSP IP/OBS HIGH 75: CPT | Performed by: PSYCHIATRY & NEUROLOGY

## 2021-01-05 PROCEDURE — 74011250637 HC RX REV CODE- 250/637: Performed by: PSYCHIATRY & NEUROLOGY

## 2021-01-05 RX ORDER — LANOLIN ALCOHOL/MO/W.PET/CERES
100 CREAM (GRAM) TOPICAL DAILY
Status: DISCONTINUED | OUTPATIENT
Start: 2021-01-06 | End: 2021-01-07 | Stop reason: HOSPADM

## 2021-01-05 RX ORDER — THERA TABS 400 MCG
1 TAB ORAL DAILY
Status: DISCONTINUED | OUTPATIENT
Start: 2021-01-06 | End: 2021-01-07 | Stop reason: HOSPADM

## 2021-01-05 RX ADMIN — PRAZOSIN HYDROCHLORIDE 1 MG: 1 CAPSULE ORAL at 20:14

## 2021-01-05 RX ADMIN — VENLAFAXINE HYDROCHLORIDE 150 MG: 37.5 CAPSULE, EXTENDED RELEASE ORAL at 08:26

## 2021-01-05 RX ADMIN — TRAZODONE HYDROCHLORIDE 50 MG: 50 TABLET ORAL at 20:14

## 2021-01-05 RX ADMIN — QUETIAPINE FUMARATE 300 MG: 300 TABLET ORAL at 20:14

## 2021-01-05 NOTE — H&P
[address] [facility] [address]  Inpatient Admission Note    Date of Service:  01/05/21    Historian(s): Komal Dill and chart review  Referral Source: [facility]    Chief Complaint   \" I was depressed on homicidal\"    History of Present Illness     Komal Dill is a 26 y.o. BLACK OR  female with a history of schizoaffective disorder, cannabis use disorder, alcohol use disorder who was admitted voluntarily from our ED after she presented there endorsing suicidal ideation with no plan, homicidal ideation and auditory and visual hallucinations. Patient is a limited and guarded historian. She has had multiple hospitalizations in the past year. She had 17 psychiatric hospitalizations in 2020. Patient was just recently discharged from [facility] on December 30 and she presented to our ED here at [facility] on January 2. She has a pattern of just going from one hospital to the next. She reports a history of homelessness and treatment noncompliance in the outpatient setting. She is to follow-up with [address] [place] [address] 2 and says she has an appointment scheduled for tomorrow. Patient reports that she has been feeling depressed and overwhelmed with the auditory hallucinations in her head. She says she is homicidal \" towards the people in my head. \"  She says nothing helps with the hallucinations and she does not believe that any medications can help with that. She is unwilling to disclose any other stressors besides hallucinations. She says she would like to move back to [place] because she has supportive family there. She is waiting on a credit card to be mailed to her which she intends to use to buy a bus ticket to get to [place]. She is homeless and has been living  In hospitals, living on the streets and sometimes living in hotels. Patient at this time denies suicidal ideation. She endorses paranoia.   She feels that people are following her and watching her. She denies history of past trauma or symptoms related to PTSD. However during previous hospitalization to this facility  she had endorsed history of past trauma and PTSD symptoms such as nightmares and flashbacks. Her UDS was positive for cocaine and THC. She admits to daily use of THC and occasional use of cocaine. She says she used cocaine 2 times this month but prior to that had not used in several months. She also admits to drinking beer daily. She is unwilling to quantify how much she drinks on average but says it is a lot. She denies withdrawal symptoms. She denies history of alcohol withdrawal seizures. Her blood alcohol was less than 3 when she came to the ED. She denies ever having any long-term additional treatment. According to records she was admitted to [facility] in December 2020. Medical Review of Systems   Constitutional: Negative. Negative for chills, diaphoresis and fever. HENT: Negative. Negative for congestion, rhinorrhea and sore throat. Eyes: Negative. Negative for pain, discharge and redness. Respiratory: Negative. Negative for cough, chest tightness, shortness of breath and wheezing. Cardiovascular: Negative. Negative for chest pain. Gastrointestinal: Negative. Negative for abdominal pain, constipation, diarrhea, nausea and vomiting. Genitourinary: Negative. Negative for dysuria, flank pain, frequency, hematuria and urgency. Musculoskeletal: Negative. Negative for back pain and neck pain. Skin: Negative. Negative for rash. Neurological: Negative. Negative for syncope, weakness, numbness and headaches. Psychiatric/Behavioral: Positive for  hallucinations. All other systems reviewed and are negative      Psychiatric Treatment History     As earlier mentioned, the patient has had multiple inpatient psychiatric hospitalizations, at least 17 hospitalizations in the past year with  most of them being at [facility] behavioral health. She has also been admitted to [facility] and [facility]. [facility] but has had only one appointment so far. She has been treated with different medications in the past including fluoxetine, Celexa, Risperdal, Trilafon, Wellbutrin, Prozac, Seroquel, Effexor. She is currently on a regimen consisting of Effexor, prazosin and Seroquel. Allergies    No Known Allergies    Medical History     Past Medical History:   Diagnosis Date    Schizoaffective disorder ([facility]          Medication(s)     Prior to Admission Medications   Prescriptions Last Dose Informant Patient Reported? Taking? QUEtiapine (SEROquel) 300 mg tablet Not Taking at Unknown time  No No   Sig: Take 1 Tab by mouth nightly. Indications: schizoaffective disorder   sertraline (ZOLOFT) 50 mg tablet Not Taking at Unknown time  No No   Sig: Take 1 Tab by mouth daily. Indications: schizoaffective disorder   traZODone (DESYREL) 100 mg tablet Not Taking at Unknown time  No No   Sig: Take 1 Tab by mouth nightly as needed for Sleep. Indications: insomnia associated with depression      Facility-Administered Medications: None             Family History     History reviewed. No pertinent family history. Psychiatric Family History    Mother with history of schizophrenia according to prior records    Social History     The patient was born in [place], raised in [place] by \" different people\". She says her parents were involved at different times. She has multiple half siblings with whom she has no contact. She went as far as the 11th grade. She is currently on disability. She is single she has 2 children ages 5 and 9 who are in the foster care system. She denies any pending charges of being on probation. She says she has been to long-term several times for different reasons which she did not want to disclose.     Vitals/Labs      Vitals:    01/04/21 1312 01/04/21 1341 01/04/21 1932 01/05/21 0800   BP: 100/70 137/89  130/84   Pulse: 82 86  76   Resp: 16 16 18 18   Temp: 98.3 °F (36.8 °C) 98.4 °F (36.9 °C) 98.6 °F (37 °C) 97.6 °F (36.4 °C)   SpO2: 100%      Weight:           Labs:   Results for orders placed or performed during the hospital encounter of 01/02/21   CBC WITH AUTOMATED DIFF   Result Value Ref Range    WBC 7.9 4.6 - 13.2 K/uL    RBC 4.02 (L) 4.20 - 5.30 M/uL    HGB 10.1 (L) 12.0 - 16.0 g/dL    HCT 31.9 (L) 35.0 - 45.0 %    MCV 79.4 74.0 - 97.0 FL    MCH 25.1 24.0 - 34.0 PG    MCHC 31.7 31.0 - 37.0 g/dL    RDW 16.6 (H) 11.6 - 14.5 %    PLATELET 708 550 - 069 K/uL    MPV 9.1 (L) 9.2 - 11.8 FL    NEUTROPHILS 58 40 - 73 %    LYMPHOCYTES 34 21 - 52 %    MONOCYTES 6 3 - 10 %    EOSINOPHILS 2 0 - 5 %    BASOPHILS 0 0 - 2 %    ABS. NEUTROPHILS 4.6 1.8 - 8.0 K/UL    ABS. LYMPHOCYTES 2.6 0.9 - 3.6 K/UL    ABS. MONOCYTES 0.4 0.05 - 1.2 K/UL    ABS. EOSINOPHILS 0.2 0.0 - 0.4 K/UL    ABS. BASOPHILS 0.0 0.0 - 0.1 K/UL    DF AUTOMATED     METABOLIC PANEL, COMPREHENSIVE   Result Value Ref Range    Sodium 139 136 - 145 mmol/L    Potassium 3.5 3.5 - 5.5 mmol/L    Chloride 107 100 - 111 mmol/L    CO2 26 21 - 32 mmol/L    Anion gap 6 3.0 - 18 mmol/L    Glucose 103 (H) 74 - 99 mg/dL    BUN 10 7.0 - 18 MG/DL    Creatinine 0.92 0.6 - 1.3 MG/DL    BUN/Creatinine ratio 11 (L) 12 - 20      GFR est AA >60 >60 ml/min/1.73m2    GFR est non-AA >60 >60 ml/min/1.73m2    Calcium 8.8 8.5 - 10.1 MG/DL    Bilirubin, total 0.2 0.2 - 1.0 MG/DL    ALT (SGPT) 29 13 - 56 U/L    AST (SGOT) 7 (L) 10 - 38 U/L    Alk.  phosphatase 99 45 - 117 U/L    Protein, total 8.2 6.4 - 8.2 g/dL    Albumin 3.4 3.4 - 5.0 g/dL    Globulin 4.8 (H) 2.0 - 4.0 g/dL    A-G Ratio 0.7 (L) 0.8 - 1.7     HCG URINE, QL   Result Value Ref Range    HCG urine, QL Negative NEG     DRUG SCREEN, URINE   Result Value Ref Range    BENZODIAZEPINES Negative NEG      BARBITURATES Negative NEG      THC (TH-CANNABINOL) Positive (A) NEG      OPIATES Negative NEG PCP(PHENCYCLIDINE) Negative NEG      COCAINE Positive (A) NEG      AMPHETAMINES Negative NEG      METHADONE Negative NEG      HDSCOM (NOTE)    ETHYL ALCOHOL   Result Value Ref Range    ALCOHOL(ETHYL),SERUM <3 0 - 3 MG/DL   SARS-COV-2   Result Value Ref Range    Specimen source Nasopharyngeal      COVID-19 rapid test Not detected NOTD      Specimen type NP Swab         Mental Status Examination     Appearance/Hygiene 26 y.o. BLACK OR  female  Hygiene: Fair   Behavior/Social Relatedness  appropriate   Musculoskeletal Gait/Station: appropriate  Tone (flaccid, cogwheeling, spastic): not assessed  Psychomotor (hyperkinetic, hypokinetic): calm  Involuntary movements (tics, dyskinesias, akathisa, stereotypies): none   Speech   Rate, rhythm, volume, fluency and articulation are appropriate   Mood   dysphoric   Affect    congruent   Thought Process Linear and goal directed    Vagueness, incoherence, circumstantiality, tangentiality, neologisms, perseveration, flight of ideas, or self-contradictory statements not present on assessment   Thought Content and Perceptual Disturbances  endorses homicidal ideations, auditory and visual hallucinations. Also endorses paranoia   Sensorium and Cognition  A&Ox4, attention intact, concentration is intact memory intact, language use appropriate, and fund of knowledge age appropriate   Insight  Limited   Judgment  Limited       Suicide Risk Assessment     Admission  Date/Time: 01/05/21    [x] Admission  [] Discharge     Key Factors:   Current admission precipitated by suicide attempt?   []  Yes     2    [x]  No     1     Suicide Attempt History  [] Past attempts of high lethality    2 [x]  Past attempts of low lethality    1 []  No previous attempts       0   Suicidal Ideation []  Constant suicidal thoughts      2 []  Intermittent or fleeting suicidal  thoughts  1 [x]  Denies current suicidal thoughts    0   Suicide Plan   []  Has plan with actual OR potential access to planned method    2 []  Has plan without access to planned method      1 [x]  No plan            0   Plan Lethality []  Highly lethal plan (Carbon monoxide, gun, hanging, jumping)    2 []  Moderate lethality of plan          1 []  Low lethality of plan (biting, head banging, superficial scratching, pillow over face)  0   Safety Plan Agreement  []  Unwilling OR unable to agree due to impaired reality testing   2   []  Patient is ambivalent and/or guarded      1 [x]  Reliably agrees        0   Current Morbid Thoughts (reunion fantasies, preoccupations with death) []  Constantly     2     [x]  Frequently    1 []  Rarely    0   Elopement Risk  []  High risk     2 []  Moderate risk    1 [x]   Low risk    0   Symptoms    []  Hopeless  [x]  Helpless  []  Anhedonia   []  Guilt/shame  []  Anger/rage  [x]  Anxiety  []  Insomnia   []  Agitation   []  Impulsivity  []  5-6 symptoms present    2 []  3-4 symptoms present    1  [x]  0-2 symptoms present    0     Total Score: 3  --------------------------------------------------------------------------------------------------------------  Subjective Appraisal of Risk:  []  Patient replies not trustworthy: several non-verbal cues. []  Patient replies questionable: trustworthy: at least 1 non-verbal cue. [x]  Patient replies appear trustworthy.     Protective measures (select all that apply):  [x]  Successful past responses to stress  []  Spiritual/Voodoo beliefs  []  Capacity for reality testing  []  Positive therapeutic relationships  []  Social supports/connections  []  Positive coping skills  []  Frustration tolerance/optimism  []  Children or pets in the home  []  Sense of responsibility to family  [x]  Agrees to treatment plan and follow up    High Risk Diagnoses (select all that apply):  [x]  Depression/Bipolar Disorder  [x]  Dual Diagnosis  []  Cardiovascular Disease  [x]  Schizophrenia  []  Chronic Pain  []  Epilepsy  []  Cancer  []  Personality Disorder  []  HIV/AIDS  [] Multiple Sclerosis    Dangerousness Assessment (Suicide, homicide, property destruction. ..)    Risk Factors reviewed and risk assessed to be:  [] low  [x] low-moderate  [] moderate   [] moderate-high  [] high     Protection factors reviewed and risk assessed to be:  [] low  [x] low-moderate  [] moderate   [] moderate-high  [] high     Response to treatment and risk assessed to be:  [x] low  [] low-moderate  [] moderate   [] moderate-high  [] high     Support reviewed and risk assessed to be:  [] low  [x] low-moderate  [] moderate   [] moderate-high  [] high     Acceptance of Discharge and outpatient treatment reviewed and risk assessed to be:    [] low  [x] low-moderate  [] moderate   [] moderate-high  [] high   Overall risk assessed to be:  [] low  [] low-moderate  [x] moderate   [] moderate-high  [] high       Assessment and Plan     Psychiatric Diagnoses:   Patient Active Problem List   Diagnosis Code    Schizoaffective disorder, bipolar type ([facility] F25.0    Suicidal ideation R45.851    [facility] F10.20    Cannabis use disorder, severe, dependence ([facility] F12.20         Level of impairment/disability: Moderate to severe    Zac Flores is a 26 y.o. who is currently requiring acute stabilization after endorsing hallucinations, depressed mood and HI    1. Admit to locked inpatient behavioral health unit. Start milieu, group, art and occupation therapy. 2. Resume Effexor  mg daily and Seroquel 300 mg daily as well as prazosin 1 mg at bedtime for schizoaffective disorder. 3. Monitor for alcohol withdrawal.  4. Encourage cessation of use of marijuana. 5. Routine labs ordered and reviewed by this provider. 6. Reviewed instructions, risks, benefits and side effects. 7. Start disposition planning; verify upcoming outpatient appointments with therapist and/or psychiatric medication prescriber. 8. Tentative date of discharge: 3-5 days.        Kalen Ball MD  [id number] [facility]

## 2021-01-05 NOTE — BH NOTES
1/2/2020         RE: Merissa Silverman  [address]  [address]  [place] [id number]        Dear Colleague,    Thank you for referring your patient, [facility]issa Silverman, [facility]. Please see a copy of my visit note below.    HPI:  I was asked to see pt by Dr. Morrison. Merissa Silverman is a 32 year old female patient here today for rash on scalp, trunk, arms, and face .  Patient states this has been present for a few months. Started on chest and then spread to arms, face, scalp and trunk.  Patient reports the following symptoms: itch. Pt works as a  .  Patient reports the following previous treatments: none.  Patient reports the following modifying factors: none.  Associated symptoms: none.  Patient has no other skin complaints today.  Remainder of the HPI, Meds, PMH, Allergies, FH, and SH was reviewed in chart.      Past Medical History:   Diagnosis Date     Gastroesophageal reflux disease      Medulloblastoma (H)        Past Surgical History:   Procedure Laterality Date     CRANIOTOMY  04/13/2018     dermoid cyst removed right ovary       INSERT PORT VASCULAR ACCESS Right 5/9/2018    Procedure: INSERT PORT VASCULAR ACCESS;  Right Central Venous Chest Port Placement;  Surgeon: Sung Selby PA-C;  Location: [facility] OR     IR PORT REMOVAL RIGHT  3/8/2019     REMOVE PORT VASCULAR ACCESS Right 3/8/2019    Procedure: Right Port Removal;  Surgeon: Pedro Briceño PA-C;  Location: [facility] OR        Family History   Problem Relation Age of Onset     Colon Cancer Paternal Grandmother      Colon Cancer Paternal Grandfather      Glaucoma No family hx of      Macular Degeneration No family hx of        Social History     Socioeconomic History     Marital status:      Spouse name: Not on file     Number of children: Not on file     Years of education: Not on file     Highest education level: Not on file   Occupational History     Not on file   Social Needs     Financial  Patient was rude and abrasive during shift.  Patient would, seemingly without provocation, target various staff for comments regarding dead individuals and various insults.  It appeared that patient was responding to internal stimuli during these times neither staff nor other patients were speaking towards her or around her.  Patient towards the end of shift began to suddenly make threats of death and violence towards writer, again unprovoked, staff was able to convince patient to go to her room.  Will continue to monitor.   resource strain: Not on file     Food insecurity:     Worry: Not on file     Inability: Not on file     Transportation needs:     Medical: Not on file     Non-medical: Not on file   Tobacco Use     Smoking status: Former Smoker     Types: Other     Smokeless tobacco: Never Used     Tobacco comment: ROBERTO CARLOSigs stopped 5/3/2018   Substance and Sexual Activity     Alcohol use: No     Comment: none     Drug use: No     Sexual activity: Not on file   Lifestyle     Physical activity:     Days per week: Not on file     Minutes per session: Not on file     Stress: Not on file   Relationships     Social connections:     Talks on phone: Not on file     Gets together: Not on file     Attends Jewish service: Not on file     Active member of club or organization: Not on file     Attends meetings of clubs or organizations: Not on file     Relationship status: Not on file     Intimate partner violence:     Fear of current or ex partner: Not on file     Emotionally abused: Not on file     Physically abused: Not on file     Forced sexual activity: Not on file   Other Topics Concern     Not on file   Social History Narrative    Started Radiation on May 14th       Outpatient Encounter Medications as of 1/2/2020   Medication Sig Dispense Refill     acetaminophen (TYLENOL) 325 MG tablet Take 650 mg by mouth       LORazepam (ATIVAN) 1 MG tablet Take 1 tab my mouth 30 minutes prior to imaging and then, repeat as needed just prior to scan. 2 tablet 0     triamcinolone (KENALOG) 0.1 % external cream Apply to AA on chest bid for 10-14 days and then when needed (Patient not taking: Reported on 1/2/2020) 80 g 0     No facility-administered encounter medications on file as of 1/2/2020.        Review Of Systems:  Skin: rash on scalp, face, trunk and arms  Eyes: negative  Ears/Nose/Throat: negative  Respiratory: No shortness of breath, dyspnea on exertion, cough, or hemoptysis  Cardiovascular: negative  Gastrointestinal: negative  Genitourinary:  negative  Musculoskeletal: negative  Neurologic: negative  Psychiatric: negative  Hematologic/Lymphatic/Immunologic: negative  Endocrine: negative      Objective:     /88   Breastfeeding No   Eyes: Conjunctivae/lids: Normal   ENT: Lips:  Normal  MSK: Normal  Cardiovascular: Peripheral edema none  Pulm: Breathing Normal  Neuro/Psych: Orientation: A/O x 3 Normal; Mood/Affect: Normal, NAD, WDWN  Pt accompanied by: self  Following areas examined: face, scalp, neck, ears, chest, abdomen, back, UE  Plata skin type:i   Findings:  Pink patches with trailing scale on face, ears, neck,  trunk and UE.   Pink and white thickened plaques on scalp  Assessment and Plan:  1) tinea corporis vs pityriasis rosea and localized pruritis  koh of chest neg  Take one pill of terbinafine by mouth daily.  Side effects of Terbinafine include but are not limited to elevated liver enzymes, nausea, vomiting, rash, taste changes, vision changes, photosensitivity, loss of smell, and headache.  Rash on face:  Apply a thin layer of triamcinolone 0.025% to affected area 2x a day for 2 weeks. Tapering with improvement.   Rash on body:  Apply a thin layer of triamcinolone to affected area on trunk and arms 2x a day for 2-3 weeks. Tapering with improvement. Do not use on face or body folds.  Discussed side effects of topical steroids including but not limited to atrophy (skin thinning), striae (stretch marks) telangiectasias, steroid acne, and others. Do not apply to normal skin. Do not apply to discolored skin that does not have rash present. Educated patient on post inflammatory hyperpigmentation.     2) seborrheic dermatitis vs psoriasis vs tinea capitis and localized pruritis  Fungal cx of scalp  Scalp:   Wash scalp daily with a medicated shampoo discussed with provider. Wet affected area daily, apply shampoo and lather, let sit for 3-5 minutes and then rinse.   If multiple shampoos discussed begin  two prescription shampoos or one  prescription shampoo and one over the counter shampoo  (examples: Head and shoulders, Selsen Blue, Ketoconazole, T-Sal, and/or T-gel.     Lidex: Apply a thin layer to affected area on scalp 2x a day x 4 weeks, tapering with improvement. Do not apply to face or body folds.     Side effects of topical steroids including but not limited to atrophy (skin thinning), striae (stretch marks) telangiectasias, steroid acne, and others. Do not apply to normal skin. Do not apply to discolored skin that does not have rash present.     Follow up in 3-4 weeks.       Again, thank you for allowing me to participate in the care of your patient.        Sincerely,        Elena Blake PA-C

## 2021-01-05 NOTE — BSMART NOTE
OCCUPATIONAL THERAPY PROGRESS NOTE Group time:1400. The patient did not awaken/get up when called for group.

## 2021-01-05 NOTE — PROGRESS NOTES
Pt. came to the desk and announced rudely and loud \" I want to go home tonight\". Khurram Chaudhari RN talked to her privately and explained about calling [facility] and possible TDO. She agreed to stay but upset and angry. She slammed her bedroom door. Will continue to monitor and provide support.

## 2021-01-05 NOTE — BH NOTES
MHT Note   Patient has been isolated to her room for most of the shift. Patient only came out in the day area during meal times. Patient is having auditory hallucinations and delusions. . patient did not participate in any group activities. Patient was able to contract for safety during the shift. No falls or major issues to report at this time.

## 2021-01-05 NOTE — BSMART NOTE
History: Ilia Arias is a 26 y.o. BLACK OR  female with a history of schizoaffective disorder, cannabis use disorder, alcohol use disorder who was admitted voluntarily from our ED after she presented there endorsing suicidal ideation with no plan, homicidal ideation and auditory and visual hallucinations. SW made contact with patient as she remained in bed. Patient reports she is tired and would like to rest.  Patient did not decline interview however, reports she wishes to continue at a later time. SW will complete interview at later time respecting patients boundaries at this time.   
 
Ari Esparza, CRISS

## 2021-01-05 NOTE — PROGRESS NOTES
Problem: Suicide  Goal: *STG: Remains safe in hospital  Description: Daily will remain safe and not engage in any self injurious behaviors. Outcome: Progressing Towards Goal  Goal: *STG/LTG: Complies with medication therapy  Description: AEB daily will be medication compliant as ordered. Outcome: Progressing Towards Goal     Problem: Falls - Risk of  Goal: *Absence of Falls  Description: Document Pinky Clark Fall Risk and appropriate interventions in the flowsheet. AEB daily will have no falls during her hospitalization. Outcome: Progressing Towards Goal  Note: Fall Risk Interventions:          Yulisa Hays is a 26 y.o. Female that presented today as labile, occasionally speaks out loudly statements; she loudly, out of know where stated \"yall mother fuckers aint gonna tell whats to do, I came here for piece of mind, yall aint gonna fuck that up\". Endorses ah and hi towards voices, denies si and vh .  Defiant, limited, and does not respond positively to staff redirection; redirection makes pt escalate. Yulisa Hays has been medication and meal compliant; has not attended any groups. RN's will initiate, develop, implement, review, and revise treatment plans as necessary. Will continue to provide education, redirection, and support as needed or verbalized by patient.    Signed By: Ayan Collins     January 5, 2021

## 2021-01-05 NOTE — BSMART NOTE
ART THERAPY GROUP PROGRESS NOTE Group time:1300 The patient was not disturbed for group at staff discretion.

## 2021-01-06 PROCEDURE — 65220000003 HC RM SEMIPRIVATE PSYCH

## 2021-01-06 PROCEDURE — 74011250637 HC RX REV CODE- 250/637: Performed by: PSYCHIATRY & NEUROLOGY

## 2021-01-06 PROCEDURE — 99231 SBSQ HOSP IP/OBS SF/LOW 25: CPT | Performed by: PSYCHIATRY & NEUROLOGY

## 2021-01-06 RX ORDER — PRAZOSIN HYDROCHLORIDE 1 MG/1
2 CAPSULE ORAL
Status: DISCONTINUED | OUTPATIENT
Start: 2021-01-06 | End: 2021-01-07 | Stop reason: HOSPADM

## 2021-01-06 RX ORDER — QUETIAPINE FUMARATE 25 MG/1
50 TABLET, FILM COATED ORAL DAILY
Status: DISCONTINUED | OUTPATIENT
Start: 2021-01-07 | End: 2021-01-07 | Stop reason: HOSPADM

## 2021-01-06 RX ADMIN — PRAZOSIN HYDROCHLORIDE 2 MG: 1 CAPSULE ORAL at 21:35

## 2021-01-06 RX ADMIN — QUETIAPINE FUMARATE 300 MG: 300 TABLET ORAL at 21:34

## 2021-01-06 RX ADMIN — VENLAFAXINE HYDROCHLORIDE 150 MG: 37.5 CAPSULE, EXTENDED RELEASE ORAL at 10:39

## 2021-01-06 RX ADMIN — TRAZODONE HYDROCHLORIDE 50 MG: 50 TABLET ORAL at 21:34

## 2021-01-06 NOTE — GROUP NOTE
HORTENCIA  GROUP DOCUMENTATION INDIVIDUAL Group Therapy Note Date: 1/6/2021 Group Start Time: 3023 Group End Time: 1400 Group Topic: Social Work Group [facility] 1 ADULT CHEM DEP Yojana Browning, [address]  Group Therapy Note Attendees: 5 Attendance: Attended Interventions/techniques: Challenged Follows Directions: Followed directions Interactions: Interacted appropriately Mental Status: Elevated Behavior/appearance: Cooperative Goals Achieved: Able to listen to others Dino Richardson
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
## 2021-01-06 NOTE — PROGRESS NOTES
[address]  Inpatient Progress Note     Date of Service: 01/06/21  Hospital Day: 2     Subjective/Interval History   01/06/21    Treatment Team Notes:  Notes reviewed and/or discussed and report that Chantelle Fernandes is a 38-year-old female with history of schizoaffective disorder who was admitted for hallucinations and homicidal ideation. Patient interview: Chantelle Fernandes was interviewed by this writer today. Patient was seen Areatha Born in her bed. Patient continues to endorse depressed mood with no improvement and no improvement in auditory hallucinations. She says she hears voices randomly at different times of the day. She is unwilling to disclose what the voices are saying but she says they are not commanding her to harm herself or others. She says she just does not feel like herself. She complains of having bad dreams at night. Appetite and energy levels are decreased. She is tolerating current medication regimen of Effexor and Seroquel. She says she is usually prescribed 50 mg of Seroquel in the morning. Objective     Visit Vitals  /84 (BP 1 Location: Right arm, BP Patient Position: Sitting)   Pulse 76   Temp 97.6 °F (36.4 °C)   Resp 18   Wt 96.6 kg (213 lb)   SpO2 100%   Breastfeeding No   BMI 33.36 kg/m²       No results found for this or any previous visit (from the past 24 hour(s)). Mental Status Examination     Appearance/Hygiene 26 y.o.  BLACK OR  female  Hygiene: disheveled   Behavior/Social Relatedness Appropriate   Musculoskeletal Gait/Station: appropriate  Tone (flaccid, cogwheeling, spastic): not assessed  Psychomotor (hyperkinetic, hypokinetic): calm   Involuntary movements (tics, dyskinesias, akathisa, stereotypies): none   Speech   Rate, rhythm, volume, fluency and articulation are appropriate   Mood   depressed   Affect    congruent   Thought Process Linear and goal directed   Thought Content and Perceptual Disturbances Endorses auditory and visual hallucinations   Sensorium and Cognition  Grossly intact   Insight  Limited   Judgment Limited        Assessment/Plan      Psychiatric Diagnoses:   Patient Active Problem List   Diagnosis Code    Schizoaffective disorder, bipolar type ([facility] [address] F25.0    Suicidal ideation R45.851    Alcohol dependence ([facility] [address] F10.20    Cannabis use disorder, severe, dependence ([facility] [address] F12.20       Level of impairment/disability: Demarco Hartmann is a 26 y.o. who is currently admitted for depression and psychosis. 1.  Add Seroquel 50 mg daily for psychosis. Continue Effexor X are 150 mg daily for depression. Seroquel 300 mg at bedtime for psychosis. 2.  Reviewed instructions, risks, benefits and side effects of medications  3. Disposition/Discharge Date: self-care/home, TBD.     MD DR. LALITO [facility]  Psychiatry

## 2021-01-06 NOTE — BSMART NOTE
OCCUPATIONAL THERAPY PROGRESS NOTE Group Time:  1400 Attendance: The patient attended full group. Participation: The patient participated with moderate elaboration in the activity. Attention: The patient was able to focus on the activity. Interaction: The patient acknowledges others or responds to questions,  with no spontaneous interaction. .Participation appropriate. Able to focus on activity and follow directions.

## 2021-01-06 NOTE — BSMART NOTE
ART THERAPY GROUP PROGRESS NOTE PATIENT SCHEDULED FOR GROUP AT: 3893 ATTENDANCE: Full PARTICIPATION LEVEL: Participates fully in the art process ATTENTION LEVEL : Able to focus on task FOCUS: Organizational ability SYMBOLIC & THEMATIC CONTENT AS NOTED IN IMAGERY: She was alert, compliant, and invested in the task. She occasionally spoke out loud to herself with random statements, however was noted to look around the room at staff after she spoke as if looking for a reaction. Her approach to task was organized and planned-out, and associations were logical and relevant during group discussion. She claimed that she wants to learn how to \"control\" herself, and spoke about thinking before speaking. Thought process was general and concrete.

## 2021-01-06 NOTE — BH NOTES
MHT Note  Patient refused to let staff take her vital signs this morning. Patient did not eat breakfast or lunch because she stated that she does not like the food. Patient did participate in some group activities this morning. Patient had a few outburst of random thoughts she was having throughout the shift. Patient was able to contract for safety during the shift. No major issues or falls to report at this time.

## 2021-01-06 NOTE — PROGRESS NOTES
Problem: Suicide  Goal: *STG: Remains safe in hospital  Description: Daily will remain safe and not engage in any self injurious behaviors. Outcome: Progressing Towards Goal  Goal: *STG/LTG: Complies with medication therapy  Description: AEB daily will be medication compliant as ordered. Outcome: Progressing Towards Goal     Problem: Falls - Risk of  Goal: *Absence of Falls  Description: Document Roxana Jiménez Fall Risk and appropriate interventions in the flowsheet. AEB daily will have no falls during her hospitalization. Outcome: Progressing Towards Goal  Note: Fall Risk Interventions:          Toni Garcia is a 26 y.o. Female that presented today as improved, not as oppositional and less frequent loud/inappropriate outbursts. Toni Garcia has been medication, meal and group compliant. RN's will initiate, develop, implement, review, and revise treatment plans as necessary. Will continue to provide education, redirection, and support as needed or verbalized by patient.    Signed By: Nadya Ponce     January 6, 2021

## 2021-01-06 NOTE — GROUP NOTE
HORTENCIA  GROUP DOCUMENTATION INDIVIDUAL Group Therapy Note Date: 1/6/2021 Group Start Time: 46 Group End Time: 0900 Group Topic: Nursing [address] SPECIAL TRTMT 1 Rosanne Rnig RN 
 
[facility] GROUP DOCUMENTATION GROUP Group Therapy Note Attendees: 4 Attendance: Did not attend Angie Alvarado RN

## 2021-01-07 VITALS
WEIGHT: 213 LBS | DIASTOLIC BLOOD PRESSURE: 84 MMHG | HEART RATE: 76 BPM | RESPIRATION RATE: 18 BRPM | SYSTOLIC BLOOD PRESSURE: 130 MMHG | OXYGEN SATURATION: 100 % | TEMPERATURE: 97.6 F | BODY MASS INDEX: 33.36 KG/M2

## 2021-01-07 PROCEDURE — 99239 HOSP IP/OBS DSCHRG MGMT >30: CPT | Performed by: PSYCHIATRY & NEUROLOGY

## 2021-01-07 PROCEDURE — 74011250637 HC RX REV CODE- 250/637: Performed by: PSYCHIATRY & NEUROLOGY

## 2021-01-07 RX ORDER — PRAZOSIN HYDROCHLORIDE 2 MG/1
2 CAPSULE ORAL
Qty: 30 CAP | Refills: 0 | Status: SHIPPED | OUTPATIENT
Start: 2021-01-07

## 2021-01-07 RX ORDER — VENLAFAXINE HYDROCHLORIDE 150 MG/1
150 CAPSULE, EXTENDED RELEASE ORAL
Qty: 30 CAP | Refills: 0 | Status: SHIPPED | OUTPATIENT
Start: 2021-01-08

## 2021-01-07 RX ORDER — QUETIAPINE FUMARATE 300 MG/1
300 TABLET, FILM COATED ORAL
Qty: 30 TAB | Refills: 0 | Status: SHIPPED | OUTPATIENT
Start: 2021-01-07

## 2021-01-07 RX ADMIN — VENLAFAXINE HYDROCHLORIDE 150 MG: 37.5 CAPSULE, EXTENDED RELEASE ORAL at 09:35

## 2021-01-07 RX ADMIN — QUETIAPINE FUMARATE 50 MG: 25 TABLET ORAL at 09:36

## 2021-01-07 NOTE — BSMART NOTE
ART THERAPY GROUP PROGRESS NOTE PATIENT SCHEDULED FOR GROUP AT: 2800 ATTENDANCE: Full PARTICIPATION LEVEL: Participates fully in the art process ATTENTION LEVEL : Needs occasional re-direction FOCUS: Boundaries SYMBOLIC & THEMATIC CONTENT AS NOTED IN IMAGERY: She was calm and invested in the task. She offered encouragement and assistance to group members. She made attention-seeking statements at times.

## 2021-01-07 NOTE — BSMART NOTE
Gustavo Curtis a 26 y.o. BLACK OR  female with a history of schizoaffective disorder, cannabis use disorder, alcohol use disorder who was admitted voluntarily from our ED after she presented there endorsing suicidal ideation with no plan, homicidal ideation and auditory and visual hallucinations. Team Treatment Meeting: SW made contact with patient as she attended team treatment meeting. Patient addressed concerns and progress related to treatment. Patient addressed concerns related to her living situation where she reports causes her stress. Patient addressed the need to focus to assist with her seeking a better relationship with her children who are currently in foster care. Team addressed the utilization of coping skills to assist with alternative treatment options instead of frequent hospitalizations. Team addressed the appropriate application of medications and encouraged patient to remain consistent with medications. Plan: SW will continue to assist patient and will address maintaining relationship with mental health  (Watson Villaseñor). SW will reiterate the importance of follow up treatment with the [facility].   
 
Fiordaliza Gale

## 2021-01-07 NOTE — BSMART NOTE
Ayden Mcguire a 26 y.o. BLACK OR  female with a history of schizoaffective disorder, cannabis use disorder, alcohol use disorder who was admitted voluntarily from our ED after she presented there endorsing suicidal ideation with no plan, homicidal ideation and auditory and visual hallucinations. Team Treatment Meeting: SW made contact with patient as she attended team treatment meeting. Patient addressed concerns and progress related to treatment. Patient addressed concerns related to her living situation where she reports causes her stress. Patient addressed the need to focus to assist with her seeking a better relationship with her children who are currently in foster care. Team addressed the utilization of coping skills to assist with alternative treatment options instead of frequent hospitalizations. Team addressed the appropriate application of medications and encouraged patient to remain consistent with medications. Plan: SW will continue to assist patient and will address the possible need for mental health support services which patient declined during last hospitalization. SW will reiterate the importance of follow up treatment with the [facility].   
 
Dusty Taylor

## 2021-01-07 NOTE — SUICIDE SAFETY PLAN
SAFETY PLAN    A suicide Safety Plan is a document that supports someone when they are having thoughts of suicide. Warning Signs that indicate a suicidal crisis may be developing: What (situations, thoughts, feelings, body sensations, behaviors, etc.) do you experience that lets you know you are beginning to think about suicide? 1. Headrasing      Internal Coping Strategies:  What things can I do (relaxation techniques, hobbies, physical activities, etc.) to take my mind off my problems without contacting another person? 1. Music  2. Walking  3. Traveling    People and social settings that provide distraction: Who can I call or where can I go to distract me? 3. Place: [address] [place] [address] Street: [facility] or [address] [place] [address] I can contact during a crisis: Who can I call for help - for example, my doctor, my psychiatrist, my psychologist, a mental health provider, a suicide hotline? 1. Clinician Name: [facility]   Phone: [phone]  3. Suicide Prevention Lifeline: 6-879-950-TALK (8478)    4. Local Behavioral Health Emergency Services:  [facility] [phone]      Making the environment safe: How can I make my environment (house/apartment/living space) safer? For example, can I remove guns, medications, and other items? 1.  Remove [employer]

## 2021-01-07 NOTE — BH NOTES
Treatment team met -     Medical Director:                                __x___present        Psychiatrist:                                        __x___present      Charge nurse:                                     __x___present           MSW:                                                __x___present      :                      __x___present      Nurse Manager:                                  _____present      Student RNs:                                      _____present      Medical Students:                               __x___present      Art Therapist:                                      _____present   Clinical Coordinator:                           _____present   Internal :                      _____present        Plan of care discussed and updated as appropriate. Patient states she has a lot going on and would like to leave the state of [place] but cannot leave her children behind. They are currently in foster care. Plans to get a hotel room temporarily then move back to her godsister's house. Admits she is non medication compliant upon d/c, which leads to frequent hospitalization. Team educated pt on compliance and coping skills. Possible d/c for tomorrow.

## 2021-01-07 NOTE — BH NOTES
Patient has been cooperative and denies thoughts of self harm or harm to others at this time. Patient has been compliant with scheduled medications, has not required PRN medications. Patient voices readiness for discharge. Patient has eaten meals and has been compliant with unit guidelines, free from falls and harm. Patient request [employer] for transportation at time of discharge.

## 2021-01-07 NOTE — DISCHARGE INSTRUCTIONS
***IMPORTANT NUMBERS***        [address] [facility]        [phone] [facility]       [phone]    Suicide Prevention     [phone]          Patient is alert x3 and ambulatory. Patient has copy of discharge papers with follow up appt. Patient has prescriptions to be filled at pharmacy of choice. Patient has all personal belongings and has signed form. Patient denies thoughts of self harm or harm to others at this time. Patient armband taken and shredded. Patient discharged to home address with bus pass.

## 2021-01-07 NOTE — BH NOTES
Patient is alert x3 and ambulatory. Patient has copy of discharge paperwork with follow up appointment. Patient has prescriptions to be filled at pharmacy of choice, patient was given 2 bus passes for transportation to [place], [facility]. patient has all personal belongings and has signed form. Patient denies thoughts of self harm or harm to others at this time. Patient had credit card sent to this facility via [employer] and received credit card prior to discharge. Patient signed for card and this nurse signed as witness. Patient escorted to exit by [facility] at time of discharge verbalizing already knowing which bus stop she will need. Patient filled out safey plan and received copy along with discharge paperwork. Original placed in patient chart. Patient also took both copies of signature sheet of discharge paperwork upon discharge. Patient signed both copies.

## 2021-01-11 NOTE — DISCHARGE SUMMARY
[facility]  Inpatient Psychiatry   Discharge Summary     Admit date: 1/2/2021    Discharge date and time: 1/7/2021  2:40 PM    Discharge Physician: Paresh Bartholomew MD    DISCHARGE DIAGNOSES     Psychiatric Diagnoses:   Patient Active Problem List   Diagnosis Code    Schizoaffective disorder, bipolar type ([facility] F25.0    Suicidal ideation R45.851    Alcohol dependence ([facility] F10.20    Cannabis use disorder, severe, dependence ([facility] F12.20       Level of impairment/disability:  None    HOSPITAL COURSE   Gerald Arias is a 26 y.o. BLACK OR  female with a history of schizoaffective disorder, cannabis use disorder, alcohol use disorder who was admitted voluntarily from our ED after she presented there endorsing suicidal ideation with no plan, homicidal ideation and auditory and visual hallucinations. Patient is a limited and guarded historian. She has had multiple hospitalizations in the past year. She had 17 psychiatric hospitalizations in 2020. Patient was just recently discharged from [facility] on December 30 and she presented to our ED here at [facility] on January 2. She has a pattern of just going from one hospital to the next. She reports a history of homelessness and treatment noncompliance in the outpatient setting. She is to follow-up with [address] [place] [address] and says she has an appointment scheduled for tomorrow.     Patient reports that she has been feeling depressed and overwhelmed with the auditory hallucinations in her head. She says she is homicidal \" towards the people in my head. \"  She says nothing helps with the hallucinations and she does not believe that any medications can help with that. She is unwilling to disclose any other stressors besides hallucinations. She says she would like to move back to [place] because she has supportive family there.   She is waiting on a credit card to be mailed to her which she intends to use to buy a bus ticket to get to [place]. She is homeless and has been living  In hospitals, living on the streets and sometimes living in hotels.     Patient at this time denies suicidal ideation. She endorses paranoia. She feels that people are following her and watching her. She denies history of past trauma or symptoms related to PTSD. However during previous hospitalization to this facility  she had endorsed history of past trauma and PTSD symptoms such as nightmares and flashbacks.     Her UDS was positive for cocaine and THC. She admits to daily use of THC and occasional use of cocaine. She says she used cocaine 2 times this month but prior to that had not used in several months. She also admits to drinking beer daily. She is unwilling to quantify how much she drinks on average but says it is a lot. She denies withdrawal symptoms. She denies history of alcohol withdrawal seizures. Her blood alcohol was less than 3 when she came to the ED. She denies ever having any long-term additional treatment. According to records she was admitted to [facility] in December 2020.  [facility] course: Patient admitted and monitored for psychotic symptoms and suicidal ideation. She did not show any symptoms of psychosis. She was never observed responding to internal stimuli. Secondary gain and malingering is strongly suspected for this hospitalization due to the fact that patient was focused on getting a credit card mailed to the hospital address and once she received the card she requested to be discharged. She was guarded during the hospital course and was unwilling to really disclose any stressors. She kept saying she had a lot going on but would not say what was actually going on. Based on pattern of multiple hospitalizations, it appears there is a strong component of malingering.   Patient was resumed on most recent regimen from last hospitalization which was just a few days prior to this current hospitalization. No changes were made to her medication regimen. Outpatient compliance is questionable. Patient was encouraged to adhere to medication regimen and outpatient treatment but so far she has not been compliant with outpatient follow-up. Patient denies suicidal ideation, homicidal ideation and psychotic symptoms at time of discharge. DISPOSITION/FOLLOW-UP     Disposition: Home (cousin's house or hotel)    Follow-up Appointments: Follow-up Information     Follow up With Specialties Details Why Contact Info    None    None ([phone]) Patient stated that they have no PCP      [facility]  On 1/11/2021 9:00am appointment for continuation of services. Mally [address] [address]  [phone]    [facility]   case management with Sami Singleton  [phone]   has been notified of discharge. MEDICATION CHANGES   Outpatient medications:  No current facility-administered medications on file prior to encounter. No current outpatient medications on file prior to encounter. Discharged medication:  Discharge Medication List as of 1/7/2021  2:26 PM      START taking these medications    Details   prazosin (MINIPRESS) 2 mg capsule Take 1 Cap by mouth nightly. Indications: high blood pressure, Print, Disp-30 Cap, R-0      venlafaxine-SR (EFFEXOR-XR) 150 mg capsule Take 1 Cap by mouth daily (with breakfast). Indications: major depressive disorder, posttraumatic stress syndrome, Print, Disp-30 Cap, R-0         CONTINUE these medications which have CHANGED    Details   QUEtiapine (SEROquel) 300 mg tablet Take 1 Tab by mouth nightly.  Indications: schizoaffective disorder, Print, Disp-30 Tab, R-0         STOP taking these medications       traZODone (DESYREL) 100 mg tablet Comments:   Reason for Stopping:         sertraline (ZOLOFT) 50 mg tablet Comments:   Reason for Stopping:               Instructions, risks (black box warning), benefits and side effects (EPS, TD, NMS) were discussed in detail prior to discharge. Patient denied any adverse medication side effects prior to discharge. LABS/IMAGING DURING ADMISSION     Results for orders placed or performed during the hospital encounter of 01/02/21   CBC WITH AUTOMATED DIFF   Result Value Ref Range    WBC 7.9 4.6 - 13.2 K/uL    RBC 4.02 (L) 4.20 - 5.30 M/uL    HGB 10.1 (L) 12.0 - 16.0 g/dL    HCT 31.9 (L) 35.0 - 45.0 %    MCV 79.4 74.0 - 97.0 FL    MCH 25.1 24.0 - 34.0 PG    MCHC 31.7 31.0 - 37.0 g/dL    RDW 16.6 (H) 11.6 - 14.5 %    PLATELET 105 274 - 579 K/uL    MPV 9.1 (L) 9.2 - 11.8 FL    NEUTROPHILS 58 40 - 73 %    LYMPHOCYTES 34 21 - 52 %    MONOCYTES 6 3 - 10 %    EOSINOPHILS 2 0 - 5 %    BASOPHILS 0 0 - 2 %    ABS. NEUTROPHILS 4.6 1.8 - 8.0 K/UL    ABS. LYMPHOCYTES 2.6 0.9 - 3.6 K/UL    ABS. MONOCYTES 0.4 0.05 - 1.2 K/UL    ABS. EOSINOPHILS 0.2 0.0 - 0.4 K/UL    ABS. BASOPHILS 0.0 0.0 - 0.1 K/UL    DF AUTOMATED     METABOLIC PANEL, COMPREHENSIVE   Result Value Ref Range    Sodium 139 136 - 145 mmol/L    Potassium 3.5 3.5 - 5.5 mmol/L    Chloride 107 100 - 111 mmol/L    CO2 26 21 - 32 mmol/L    Anion gap 6 3.0 - 18 mmol/L    Glucose 103 (H) 74 - 99 mg/dL    BUN 10 7.0 - 18 MG/DL    Creatinine 0.92 0.6 - 1.3 MG/DL    BUN/Creatinine ratio 11 (L) 12 - 20      GFR est AA >60 >60 ml/min/1.73m2    GFR est non-AA >60 >60 ml/min/1.73m2    Calcium 8.8 8.5 - 10.1 MG/DL    Bilirubin, total 0.2 0.2 - 1.0 MG/DL    ALT (SGPT) 29 13 - 56 U/L    AST (SGOT) 7 (L) 10 - 38 U/L    Alk.  phosphatase 99 45 - 117 U/L    Protein, total 8.2 6.4 - 8.2 g/dL    Albumin 3.4 3.4 - 5.0 g/dL    Globulin 4.8 (H) 2.0 - 4.0 g/dL    A-G Ratio 0.7 (L) 0.8 - 1.7     HCG URINE, QL   Result Value Ref Range    HCG urine, QL Negative NEG     DRUG SCREEN, URINE   Result Value Ref Range    BENZODIAZEPINES Negative NEG      BARBITURATES Negative NEG      THC (TH-CANNABINOL) Positive (A) NEG      OPIATES Negative NEG      PCP(PHENCYCLIDINE) Negative NEG      COCAINE Positive (A) NEG      AMPHETAMINES Negative NEG      METHADONE Negative NEG      HDSCOM (NOTE)    ETHYL ALCOHOL   Result Value Ref Range    ALCOHOL(ETHYL),SERUM <3 0 - 3 MG/DL   SARS-COV-2   Result Value Ref Range    Specimen source Nasopharyngeal      COVID-19 rapid test Not detected NOTD      Specimen type NP Swab          DISCHARGE MENTAL STATUS EVALUATION     Appearance/Hygiene 26 y.o. BLACK OR  female  Hygiene: Good   Attitude/Behavior/Social Relatedness Appropriate   Musculoskeletal Gait/Station: appropriate  Tone (flaccid, cogwheeling, spastic): not assessed  Psychomotor (hyperkinetic, hypokinetic): calm  Involuntary movements (tics, dyskinesias, akathisa, stereotypies): none   Speech   Rate, rhythm, volume, fluency and articulation are appropriate   Mood   euthymic   Affect    congruent   Thought Process Linear and goal directed   Thought Content and Perceptual Disturbances Denies self-injurious behavior (SIB), suicidal ideation (SI), aggressive behavior or homicidal ideation (HI)    Denies auditory and visual hallucinations   Sensorium and Cognition  Grossly intact   Insight  Limited   Judgment  Limited       SUICIDE RISK ASSESSMENT     [] Admission  [x] Discharge     Key Factors:   Current admission precipitated by suicide attempt?   []  Yes     2    [x]  No     1     Suicide Attempt History  [] Past attempts of high lethality    2 [x]  Past attempts of low lethality    1 []  No previous attempts       0   Suicidal Ideation []  Constant suicidal thoughts      2 []  Intermittent or fleeting suicidal  thoughts  1 [x]  Denies current suicidal thoughts    0   Suicide Plan   []  Has plan with actual OR potential access to planned method    2 []  Has plan without access to planned method      1 [x]  No plan            0   Plan Lethality []  Highly lethal plan (Carbon monoxide, gun, hanging, jumping)    2 []  Moderate lethality of plan          1 []  Low lethality of plan (biting, head banging, superficial scratching, pillow over face)  0   Safety Plan Agreement  []  Unwilling OR unable to agree due to impaired reality testing   2   []  Patient is ambivalent and/or guarded      1 [x]  Reliably agrees        0   Current Morbid Thoughts (reunion fantasies, preoccupations with death) []  Constantly     2     []  Frequently    1 [x]  Rarely    0   Elopement Risk  []  High risk     2 []  Moderate risk    1 [x]   Low risk    0   Symptoms    []  Hopeless  []  Helpless  []  Anhedonia   []  Guilt/shame  []  Anger/rage  []  Anxiety  []  Insomnia   []  Agitation   []  Impulsivity  []  5-6 symptoms present    2 []  3-4 symptoms present    1  [x]  0-2 symptoms present    0     Scoring Key:  10 or higher = Imminent Risk (consider 1:1)  4 - 9 = Moderate Risk (consider q 15 minute observation)Attended alcohol, tobacco, prescription and other drug psychoeducation group.   0 - 3 = Low Risk (consider q 30 minute observation)    Total Score: 2  ------------------------------------------------------------------------------------------------------------------  PLEASE ADDRESS THE FOLLOWING 5 ISSUES     Physician's Subjective Appraisal of Risk (check one):  []  Patient replies not trustworthy: several non-verbal cues. []  Patient replies questionable: trustworthy: at least 1 non-verbal cue. [x]  Patient replies appear trustworthy. Family History of Suicide?    []  Yes  [x]  No    Protective measures (select all that apply):  [x]  Successful past responses to stress  []  Spiritual/Baptist beliefs  [x]  Capacity for reality testing  []  Positive therapeutic relationships  [x]  Social supports/connections  [x]  Positive coping skills  []  Frustration tolerance/optimism  []  Children or pets in the home  []  Sense of responsibility to family  [x]  Agrees to treatment plan and follow up    Others (list):    High Risk Diagnoses (select all that apply):  [x]  Depression/Bipolar Disorder  [x]  Dual Diagnosis  []  Cardiovascular Disease  [x]  Schizophrenia  []  Chronic Pain  []  Epilepsy  []  Cancer  []  Personality Disorder  []  HIV/AIDS  []  Multiple Sclerosis    Dangerousness Assessment (Suicide, homicide, property destruction. ..)    Risk Factors reviewed and risk assessed to be:  [] low  [] low-moderate  [x] moderate   [] moderate-high  [] high     Protection factors reviewed and risk assessed to be:  [x] low  [] low-moderate  [] moderate   [] moderate-high  [] high     Response to treatment and risk assessed to be:  [x] low  [] low-moderate  [] moderate   [] moderate-high  [] high     Support reviewed and risk assessed to be:  [x] low  [] low-moderate  [] moderate   [] moderate-high  [] high     Acceptance of Discharge and outpatient treatment reviewed and risk assessed to be:    [x] low  [] low-moderate  [] moderate   [] moderate-high  [] high   Overall risk assessed to be:  [x] low  [] low-moderate  [] moderate   [] moderate-high  [] high     Completion of discharge was greater than 30 minutes. Over 50% of today's discharge was geared towards counseling and coordination of care.           Radha Frank MD  Psychiatry  [facility]

## 2022-03-18 PROBLEM — F12.20 CANNABIS USE DISORDER, SEVERE, DEPENDENCE (HCC): Status: ACTIVE | Noted: 2020-09-11

## 2022-03-18 PROBLEM — R45.851 SUICIDAL IDEATION: Status: ACTIVE | Noted: 2020-09-11

## 2022-03-18 PROBLEM — F10.20 ALCOHOL DEPENDENCE (HCC): Status: ACTIVE | Noted: 2020-09-11

## 2022-03-19 PROBLEM — F25.0 SCHIZOAFFECTIVE DISORDER, BIPOLAR TYPE (HCC): Status: ACTIVE | Noted: 2019-12-23

## 2023-03-12 ENCOUNTER — HOSPITAL ENCOUNTER (EMERGENCY)
Age: 33
Discharge: HOME | End: 2023-03-12
Attending: EMERGENCY MEDICINE
Payer: MEDICAID

## 2023-03-12 ENCOUNTER — HOSPITAL ENCOUNTER (INPATIENT)
Age: 33
LOS: 8 days | Discharge: HOME | DRG: 750 | End: 2023-03-20
Attending: PSYCHIATRY & NEUROLOGY | Admitting: PSYCHIATRY & NEUROLOGY
Payer: MEDICAID

## 2023-03-12 VITALS
RESPIRATION RATE: 18 BRPM | HEIGHT: 65 IN | SYSTOLIC BLOOD PRESSURE: 129 MMHG | HEART RATE: 86 BPM | DIASTOLIC BLOOD PRESSURE: 82 MMHG | TEMPERATURE: 98.3 F | OXYGEN SATURATION: 98 % | BODY MASS INDEX: 35.32 KG/M2 | WEIGHT: 212 LBS

## 2023-03-12 DIAGNOSIS — F25.9 SCHIZOAFFECTIVE DISORDER, UNSPECIFIED TYPE (HCC): Primary | ICD-10-CM

## 2023-03-12 PROBLEM — R45.850 HOMICIDAL IDEATION: Status: ACTIVE | Noted: 2023-03-12

## 2023-03-12 LAB
ALBUMIN SERPL-MCNC: 2.9 G/DL (ref 3.5–5)
ALBUMIN/GLOB SERPL: 0.7 (ref 1.1–2.2)
ALP SERPL-CCNC: 74 U/L (ref 45–117)
ALT SERPL-CCNC: 16 U/L (ref 12–78)
AMPHET UR QL SCN: NEGATIVE
ANION GAP SERPL CALC-SCNC: 8 MMOL/L (ref 5–15)
AST SERPL-CCNC: 11 U/L (ref 15–37)
BARBITURATES UR QL SCN: NEGATIVE
BASOPHILS # BLD: 0 K/UL (ref 0–0.1)
BASOPHILS NFR BLD: 0 % (ref 0–1)
BENZODIAZ UR QL: NEGATIVE
BILIRUB SERPL-MCNC: 0.3 MG/DL (ref 0.2–1)
BUN SERPL-MCNC: 9 MG/DL (ref 6–20)
BUN/CREAT SERPL: 14 (ref 12–20)
CALCIUM SERPL-MCNC: 8.7 MG/DL (ref 8.5–10.1)
CANNABINOIDS UR QL SCN: NEGATIVE
CHLORIDE SERPL-SCNC: 108 MMOL/L (ref 97–108)
CO2 SERPL-SCNC: 25 MMOL/L (ref 21–32)
COCAINE UR QL SCN: POSITIVE
CREAT SERPL-MCNC: 0.64 MG/DL (ref 0.55–1.02)
DIFFERENTIAL METHOD BLD: ABNORMAL
DRUG SCRN COMMENT,DRGCM: ABNORMAL
EOSINOPHIL # BLD: 0.1 K/UL (ref 0–0.4)
EOSINOPHIL NFR BLD: 2 % (ref 0–7)
ERYTHROCYTE [DISTWIDTH] IN BLOOD BY AUTOMATED COUNT: 17.4 % (ref 11.5–14.5)
ETHANOL SERPL-MCNC: <10 MG/DL
FLUAV RNA SPEC QL NAA+PROBE: NOT DETECTED
FLUBV RNA SPEC QL NAA+PROBE: NOT DETECTED
GLOBULIN SER CALC-MCNC: 4.1 G/DL (ref 2–4)
GLUCOSE SERPL-MCNC: 102 MG/DL (ref 65–100)
HCG UR QL: NEGATIVE
HCT VFR BLD AUTO: 29.5 % (ref 35–47)
HGB BLD-MCNC: 8.8 G/DL (ref 11.5–16)
IMM GRANULOCYTES # BLD AUTO: 0 K/UL (ref 0–0.04)
IMM GRANULOCYTES NFR BLD AUTO: 0 % (ref 0–0.5)
LYMPHOCYTES # BLD: 1.8 K/UL (ref 0.8–3.5)
LYMPHOCYTES NFR BLD: 26 % (ref 12–49)
MCH RBC QN AUTO: 23 PG (ref 26–34)
MCHC RBC AUTO-ENTMCNC: 29.8 G/DL (ref 30–36.5)
MCV RBC AUTO: 77.2 FL (ref 80–99)
METHADONE UR QL: NEGATIVE
MONOCYTES # BLD: 0.5 K/UL (ref 0–1)
MONOCYTES NFR BLD: 8 % (ref 5–13)
NEUTS SEG # BLD: 4.5 K/UL (ref 1.8–8)
NEUTS SEG NFR BLD: 64 % (ref 32–75)
NRBC # BLD: 0 K/UL (ref 0–0.01)
NRBC BLD-RTO: 0 PER 100 WBC
OPIATES UR QL: NEGATIVE
PCP UR QL: NEGATIVE
PLATELET # BLD AUTO: 494 K/UL (ref 150–400)
PMV BLD AUTO: 8.7 FL (ref 8.9–12.9)
POTASSIUM SERPL-SCNC: 4.1 MMOL/L (ref 3.5–5.1)
PROT SERPL-MCNC: 7 G/DL (ref 6.4–8.2)
RBC # BLD AUTO: 3.82 M/UL (ref 3.8–5.2)
SARS-COV-2 RNA RESP QL NAA+PROBE: NOT DETECTED
SODIUM SERPL-SCNC: 141 MMOL/L (ref 136–145)
WBC # BLD AUTO: 7 K/UL (ref 3.6–11)

## 2023-03-12 PROCEDURE — 87636 SARSCOV2 & INF A&B AMP PRB: CPT

## 2023-03-12 PROCEDURE — 81025 URINE PREGNANCY TEST: CPT

## 2023-03-12 PROCEDURE — 93005 ELECTROCARDIOGRAM TRACING: CPT

## 2023-03-12 PROCEDURE — 99285 EMERGENCY DEPT VISIT HI MDM: CPT

## 2023-03-12 PROCEDURE — 65220000003 HC RM SEMIPRIVATE PSYCH

## 2023-03-12 PROCEDURE — 36415 COLL VENOUS BLD VENIPUNCTURE: CPT

## 2023-03-12 PROCEDURE — 85025 COMPLETE CBC W/AUTO DIFF WBC: CPT

## 2023-03-12 PROCEDURE — 80053 COMPREHEN METABOLIC PANEL: CPT

## 2023-03-12 PROCEDURE — 80307 DRUG TEST PRSMV CHEM ANLYZR: CPT

## 2023-03-12 PROCEDURE — 82077 ASSAY SPEC XCP UR&BREATH IA: CPT

## 2023-03-12 PROCEDURE — 74011250637 HC RX REV CODE- 250/637: Performed by: PSYCHIATRY & NEUROLOGY

## 2023-03-12 PROCEDURE — 90791 PSYCH DIAGNOSTIC EVALUATION: CPT

## 2023-03-12 RX ORDER — ACETAMINOPHEN 325 MG/1
650 TABLET ORAL
Status: DISCONTINUED | OUTPATIENT
Start: 2023-03-12 | End: 2023-03-20 | Stop reason: HOSPADM

## 2023-03-12 RX ORDER — TRAZODONE HYDROCHLORIDE 150 MG/1
150 TABLET ORAL
Status: DISCONTINUED | OUTPATIENT
Start: 2023-03-12 | End: 2023-03-20 | Stop reason: HOSPADM

## 2023-03-12 RX ORDER — QUETIAPINE FUMARATE 25 MG/1
50 TABLET, FILM COATED ORAL 2 TIMES DAILY
Status: DISCONTINUED | OUTPATIENT
Start: 2023-03-13 | End: 2023-03-13 | Stop reason: SDUPTHER

## 2023-03-12 RX ORDER — QUETIAPINE FUMARATE 300 MG/1
300 TABLET, FILM COATED ORAL
Status: DISCONTINUED | OUTPATIENT
Start: 2023-03-12 | End: 2023-03-20 | Stop reason: HOSPADM

## 2023-03-12 RX ORDER — MAG HYDROX/ALUMINUM HYD/SIMETH 200-200-20
30 SUSPENSION, ORAL (FINAL DOSE FORM) ORAL
Status: DISCONTINUED | OUTPATIENT
Start: 2023-03-12 | End: 2023-03-20 | Stop reason: HOSPADM

## 2023-03-12 RX ORDER — ADHESIVE BANDAGE
30 BANDAGE TOPICAL DAILY PRN
Status: DISCONTINUED | OUTPATIENT
Start: 2023-03-12 | End: 2023-03-20 | Stop reason: HOSPADM

## 2023-03-12 RX ORDER — HYDROXYZINE 50 MG/1
50 TABLET, FILM COATED ORAL
Status: DISCONTINUED | OUTPATIENT
Start: 2023-03-12 | End: 2023-03-20 | Stop reason: HOSPADM

## 2023-03-12 RX ORDER — VENLAFAXINE HYDROCHLORIDE 75 MG/1
150 CAPSULE, EXTENDED RELEASE ORAL
Status: DISCONTINUED | OUTPATIENT
Start: 2023-03-13 | End: 2023-03-20 | Stop reason: HOSPADM

## 2023-03-12 RX ADMIN — TRAZODONE HYDROCHLORIDE 150 MG: 150 TABLET ORAL at 21:25

## 2023-03-12 RX ADMIN — QUETIAPINE FUMARATE 300 MG: 300 TABLET ORAL at 21:25

## 2023-03-12 NOTE — ED PROVIDER NOTES
[facility] EMERGENCY DEPT  EMERGENCY DEPARTMENT ENCOUNTER       Pt Name: Huber Alonso  MRN: [id number]  [place] 1990  Date of evaluation: 3/12/2023  Provider: Mitali Hartmann NP   PCP: None  Note Started: 3:18 PM 3/12/23     CHIEF COMPLAINT       Chief Complaint   Patient presents with    Mental Health Problem        HISTORY OF PRESENT ILLNESS: 1 or more elements      History From: Patient  HPI Limitations : Mental Health Disorder     Huber Alonso is a 26 y.o. adult who presents ambulatory to the emergency department. Patient states that she is hearing voices which trigger her. She states that her voices hear her. She states that she has been increasingly depressed due to family stressors. States that she has a roommate and she wants to shoot her. She states she has access to a gun. Patient states that she is recovering from alcohol and is in the  [facility] recovering program.  Patient also admits to recovering from cocaine use. She states she has not had her psych meds in 10 days and just started them. she states she takes Seroquel trazodone and Effexor. Patient also states she has suicidal thoughts but has no plan. Nursing Notes were all reviewed and agreed with or any disagreements were addressed in the HPI. REVIEW OF SYSTEMS      Review of Systems   Constitutional:  Negative for chills, fatigue and fever. HENT:  Negative for congestion and sore throat. Eyes:  Negative for redness. Respiratory:  Negative for cough, chest tightness and wheezing. Cardiovascular:  Negative for chest pain. Gastrointestinal:  Negative for abdominal pain. Genitourinary:  Negative for dysuria. Musculoskeletal:  Negative for arthralgias, back pain, myalgias, neck pain and neck stiffness. Skin:  Negative for rash. Neurological:  Negative for dizziness, syncope, weakness, light-headedness, numbness and headaches. Hematological:  Negative for adenopathy.    Psychiatric/Behavioral:  Negative for agitation and behavioral problems. Depressed HI   All other systems reviewed and are negative. Positives and Pertinent negatives as per HPI. PAST HISTORY     Past Medical History:  Past Medical History:   Diagnosis Date    Schizoaffective disorder [facility])        Past Surgical History:  History reviewed. No pertinent surgical history. Family History:  History reviewed. No pertinent family history. Social History:  Social History     Tobacco Use    Smoking status: Every Day     Types: Cigarettes    Smokeless tobacco: Never   Substance Use Topics    Alcohol use: Not Currently    Drug use: Yes     Types: Cocaine       Allergies:  No Known Allergies    CURRENT MEDICATIONS      Previous Medications    PRAZOSIN (MINIPRESS) 2 MG CAPSULE    Take 1 Cap by mouth nightly. Indications: high blood pressure    QUETIAPINE (SEROQUEL) 300 MG TABLET    Take 1 Tab by mouth nightly. Indications: schizoaffective disorder    VENLAFAXINE-SR (EFFEXOR-XR) 150 MG CAPSULE    Take 1 Cap by mouth daily (with breakfast). Indications: major depressive disorder, posttraumatic stress syndrome       PHYSICAL EXAM      ED Triage Vitals [03/12/23 1038]   ED Encounter Vitals Group      /82      Pulse (Heart Rate) 86      Resp Rate 18      Temp 98.3 °F (36.8 °C)      Temp src       O2 Sat (%) 98 %      Weight 212 lb      Height 5' 5\"        Physical Exam  Vitals and nursing note reviewed. Constitutional:       General: He is not in acute distress. Appearance: Normal appearance. He is well-developed. HENT:      Head: Normocephalic and atraumatic. Right Ear: External ear normal.      Left Ear: External ear normal.      Nose: Nose normal.      Mouth/Throat:      Mouth: Mucous membranes are moist.   Eyes:      Conjunctiva/sclera: Conjunctivae normal.   Cardiovascular:      Rate and Rhythm: Normal rate and regular rhythm. Pulmonary:      Effort: Pulmonary effort is normal. No respiratory distress.       Breath sounds: Normal breath sounds. No wheezing. Abdominal:      General: Bowel sounds are normal.      Palpations: Abdomen is soft. Tenderness: There is no abdominal tenderness. Musculoskeletal:         General: Normal range of motion. Cervical back: Normal range of motion and neck supple. Lymphadenopathy:      Cervical: No cervical adenopathy. Skin:     General: Skin is warm and dry. Findings: No rash. Neurological:      Mental Status: He is alert and oriented to person, place, and time. Cranial Nerves: No cranial nerve deficit. Coordination: Coordination normal.   Psychiatric:         Attention and Perception: Attention normal. He perceives auditory hallucinations. Mood and Affect: Affect is flat. Speech: Speech normal.         Behavior: Behavior normal. Behavior is cooperative. Thought Content: Thought content includes suicidal ideation. Thought content does not include suicidal plan. Judgment: Judgment is impulsive.         DIAGNOSTIC RESULTS   LABS:     Recent Results (from the past 12 hour(s))   DRUG SCREEN, URINE    Collection Time: 03/12/23 12:24 PM   Result Value Ref Range    AMPHETAMINES Negative NEG      BARBITURATES Negative NEG      BENZODIAZEPINES Negative NEG      COCAINE Positive (A) NEG      METHADONE Negative NEG      OPIATES Negative NEG      PCP(PHENCYCLIDINE) Negative NEG      THC (TH-CANNABINOL) Negative NEG      Drug screen comment (NOTE)    ETHYL ALCOHOL    Collection Time: 03/12/23 12:24 PM   Result Value Ref Range    ALCOHOL(ETHYL),SERUM <10 <10 MG/DL   CBC WITH AUTOMATED DIFF    Collection Time: 03/12/23 12:24 PM   Result Value Ref Range    WBC 7.0 3.6 - 11.0 K/uL    RBC 3.82 3.80 - 5.20 M/uL    HGB 8.8 (L) 11.5 - 16.0 g/dL    HCT 29.5 (L) 35.0 - 47.0 %    MCV 77.2 (L) 80.0 - 99.0 FL    MCH 23.0 (L) 26.0 - 34.0 PG    MCHC 29.8 (L) 30.0 - 36.5 g/dL    RDW 17.4 (H) 11.5 - 14.5 %    PLATELET 711 (H) 722 - 400 K/uL    MPV 8.7 (L) 8.9 - 12.9 FL    NRBC 0.0 0  WBC    ABSOLUTE NRBC 0.00 0.00 - 0.01 K/uL    NEUTROPHILS 64 32 - 75 %    LYMPHOCYTES 26 12 - 49 %    MONOCYTES 8 5 - 13 %    EOSINOPHILS 2 0 - 7 %    BASOPHILS 0 0 - 1 %    IMMATURE GRANULOCYTES 0 0.0 - 0.5 %    ABS. NEUTROPHILS 4.5 1.8 - 8.0 K/UL    ABS. LYMPHOCYTES 1.8 0.8 - 3.5 K/UL    ABS. MONOCYTES 0.5 0.0 - 1.0 K/UL    ABS. EOSINOPHILS 0.1 0.0 - 0.4 K/UL    ABS. BASOPHILS 0.0 0.0 - 0.1 K/UL    ABS. IMM. GRANS. 0.0 0.00 - 0.04 K/UL    DF AUTOMATED     METABOLIC PANEL, COMPREHENSIVE    Collection Time: 03/12/23 12:24 PM   Result Value Ref Range    Sodium 141 136 - 145 mmol/L    Potassium 4.1 3.5 - 5.1 mmol/L    Chloride 108 97 - 108 mmol/L    CO2 25 21 - 32 mmol/L    Anion gap 8 5 - 15 mmol/L    Glucose 102 (H) 65 - 100 mg/dL    BUN 9 6 - 20 MG/DL    Creatinine 0.64 0.55 - 1.02 MG/DL    BUN/Creatinine ratio 14 12 - 20      eGFR >60 >60 ml/min/1.73m2    Calcium 8.7 8.5 - 10.1 MG/DL    Bilirubin, total 0.3 0.2 - 1.0 MG/DL    ALT (SGPT) 16 12 - 78 U/L    AST (SGOT) 11 (L) 15 - 37 U/L    Alk.  phosphatase 74 45 - 117 U/L    Protein, total 7.0 6.4 - 8.2 g/dL    Albumin 2.9 (L) 3.5 - 5.0 g/dL    Globulin 4.1 (H) 2.0 - 4.0 g/dL    A-G Ratio 0.7 (L) 1.1 - 2.2     COVID-19 WITH INFLUENZA A/B    Collection Time: 03/12/23 12:24 PM   Result Value Ref Range    SARS-CoV-2 by PCR Not detected NOTD      Influenza A by PCR Not detected      Influenza B by PCR Not detected     HCG URINE, QL. - POC    Collection Time: 03/12/23 12:28 PM   Result Value Ref Range    Pregnancy test,urine (POC) Negative NEG     EKG, 12 LEAD, INITIAL    Collection Time: 03/12/23  1:16 PM   Result Value Ref Range    Ventricular Rate 71 BPM    Atrial Rate 71 BPM    P-R Interval 184 ms    QRS Duration 84 ms    Q-T Interval 390 ms    QTC Calculation (Bezet) 423 ms    Calculated P Axis 46 degrees    Calculated R Axis 57 degrees    Calculated T Axis 43 degrees    Diagnosis       Normal sinus rhythm  Nonspecific T wave abnormality  No previous ECGs available          EKG: When ordered, EKG's are interpreted by the Emergency Department Physician in the absence of a cardiologist.  Please see their note for interpretation of EKG. RADIOLOGY:  Non-plain film images such as CT, Ultrasound and MRI are read by the radiologist. Plain radiographic images are visualized and preliminarily interpreted by the ED Provider with the below findings:          Interpretation per the Radiologist below, if available at the time of this note:     No results found. PROCEDURES   Unless otherwise noted below, none  Procedures     CRITICAL CARE TIME       EMERGENCY DEPARTMENT COURSE and DIFFERENTIAL DIAGNOSIS/MDM   Vitals:    Vitals:    03/12/23 1038   BP: 129/82   Pulse: 86   Resp: 18   Temp: 98.3 °F (36.8 °C)   SpO2: 98%   Weight: 96.2 kg (212 lb)   Height: 5' 5\" (1.651 m)        Patient was given the following medications:  Medications - No data to display    CONSULTS: (Who and What was discussed)  None    Chronic Conditions: Schizoaffective disorder    Social Determinants affecting Dx or Tx: Patient has a substance abuse problem. Records Reviewed (source and summary of external records): Prior medical records and Nursing notes    CC/HPI Summary, DDx, ED Course, and Reassessment:  57-year-old female dents ambulatory to the ER stating she wants to shoot her roommate has access to a gun states she has a lot of stressors at home is hearing voices. Assessment is patient has a flat affect speaks rapidly. I will order a consult with  [facility] counselor. Order labs per protocol differential diagnosis schizoaffective disorder SI substance-induced mood disorder           Patient cooperative,resting comfortably. Waiting transfer to [facility]  PM     ED Course as of 03/12/23 1518   Sun Mar 12, 2023   1404 Per Mili Serrato with [facility], patient will be admitted to [facility] inpatient psychiatry unit by Dr. Phillips Cons.   His UDS is positive for cocaine. Will order an EKG. [MS]   316 ED EKG interpretation:  Rhythm: normal sinus rhythm; and regular . Rate (approx.): 71; Axis: normal; P wave: normal; QRS interval: normal ; ST/T wave: normal; Other findings: normal. This EKG was interpreted by Alexandrea Lizama MD,ED Provider. [MS]   [phone] Patient presents with auditory hallucinations telling him to harm himself. He tells me he has not been taking his Seroquel as prescribed. Patient is responding to internal stimuli but otherwise has am unremarkable physical exam. [MS]      ED Course User Index  [MS] Keyla Piedra MD       Disposition Considerations (Tests not done, Shared Decision Making, Pt Expectation of Test or Tx.):      FINAL IMPRESSION     1. Schizoaffective disorder, unspecified type [facility])          DISPOSITION/PLAN   Decision to Admit   Will be admitted to [facility]. Bed assignment is pending. ED Attending Involvement : I have seen and evaluated the patient. My supervision physician was available for consultation. I am the Primary Clinician of Record. Mitali Hartmann NP (electronically signed)    (Please note that parts of this dictation were completed with voice recognition software. Quite often unanticipated grammatical, syntax, homophones, and other interpretive errors are inadvertently transcribed by the computer software. Please disregards these errors.  Please excuse any errors that have escaped final proofreading.)

## 2023-03-12 NOTE — ED NOTES
Patient here with c/o mental health problem. Patient states (he) has had \"people around me and voices\" telling him to hurt himself. Patient otherwise calm and cooperative. Patient reports recent birthday, states drinks alcohol often and states hx oc cocaine use. Patient asking for food, states \"are you guys going to keep me?\"                Emergency [address] is developed from the Nursing assessment and Emergency Department Attending provider initial evaluation. The plan of care may be reviewed in the ED Provider note.     The Plan of Care was developed with the following considerations:   Patient / Family readiness to learn indicated by:verbalized understanding  Persons(s) to be included in education: patient  Barriers to Learning/Limitations:No    Signed     Calin Patel RN    3/12/2023   11:17 AM

## 2023-03-12 NOTE — ED NOTES
TRANSFER - OUT REPORT:    Cesar Gómez report given to Tosin Ramirez RN (name) on Golden Marks  being transferred to [facility] for routine progression of care   ([phone])     Report consisted of patients Situation, Background, Assessment and   Recommendations(SBAR). Information from the following report(s) SBAR, ED Summary, Intake/Output, MAR, and Recent Results was reviewed with the receiving nurse. Lines:       Opportunity for questions and clarification was provided.       Patient transported with:   Kaylin Mejia

## 2023-03-12 NOTE — BSMART NOTE
Comprehensive Assessment Form Part 1    Section I - Disposition    Axis I - Schizoaffective Disorder   Axis II - Deferred  Axis III - None  Axis IV - Relationship stressors  [place] V - 35      The Medical Doctor to Psychiatrist conference was not completed. The Medical Doctor is in agreement with Psychiatrist disposition because of (reason) patient is requesting voluntary admission. The plan is admit to [facility] room 232 bed 1. The on-call Psychiatrist consulted was Dr. Hoda Martinez. The admitting Psychiatrist will be Dr. Hoda Martinez. The admitting Diagnosis is Schizoaffective Disorder. The Payor source is DEONTE Mcmahon. This writer reviewed the [address] in nursing flowsheet and the risk level assigned is low. Based on this assessment, the risk of suicide is moderate and the plan is admit to inpatient psych. Section II - Integrated Summary  Summary:  Patient is a 26year old transgender male individual seen face to face in the ER. He came to the ER reporting suicidal and homicidal ideation since using cocaine and alcohol yesterday. He reported a long history of treatment for schizoaffective disorder and substance abuse. His most recent psychiatric admission in the [facility] system was at [place] in January 2021. He reported he has not had medications for 1 1/2 weeks. He recently saw the program psychiatrist but his medications have not been supplied by the program yet. He reported that he has been having suicidal thoughts \"a little bit\" but has no plan and doesn't intend to act on them. He reported 2 previous suicide attempts via overdosing on medications, once last year and once 2 years ago. He reported hearing voices telling him to hurt himself.   He reported that homicidal thoughts and depression are more of a problem currently, and that \"people are triggering me\" at the program.  He reported his plan would be to shoot others and while he doesn't have a gun in his possession, he does have access to a firearm. He is requesting voluntary admission. The patient has demonstrated mental capacity to provide informed consent. The information is given by the patient and past medical records. The Chief Complaint is mental health problem. The Precipitant Factors are relationship stressors, chronic substance abuse. Previous Hospitalizations: yes  The patient has not previously been in restraints. Current Psychiatrist and/or  is NA. Lethality Assessment:    The potential for suicide is noted by the following: previous history of attempts, ideation, and current substance abuse. The potential for homicide is noted by the following : defined plan, current substance abuse, and ideation. The patient has not been a perpetrator of sexual or physical abuse. There are not pending charges. The patient is felt to be at risk for self harm or harm to others. The attending nurse was advised the patient needs supervision. Section III - Psychosocial  The patient's overall mood and attitude is withdrawn. Feelings of helplessness and hopelessness are not observed. Generalized anxiety is not observed. Panic is not observed. Phobias are not observed. Obsessive compulsive tendencies are not observed. Section IV - Mental Status Exam  The patient's appearance shows no evidence of impairment. The patient's behavior shows no evidence of impairment. The patient is oriented to time, place, person and situation. The patient's speech shows no evidence of impairment. The patient's mood is withdrawn. The range of affect is constricted. The patient's thought content demonstrates no evidence of impairment. The thought process shows no evidence of impairment. The patient's perception demonstrated changes in the following:  auditory  hallucinations. The patient's memory shows no evidence of impairment. The patient's appetite shows no evidence of impairment. The patient's sleep has evidence of insomnia. The patient's insight is blaming. The patient's judgement is psychologically impaired. Section V - Substance Abuse  The patient is using substances. The patient is using alcohol for greater than 10 years with last use on yesterday and cocaine by smoking for greater than 10 years with last use on yesterday. The patient has experienced the following withdrawal symptoms: cravings. Section VI - Living Arrangements  The patient is single. The patient lives at the recovery program \"[facility]. \" The patient has no children. The patient does plan to return home upon discharge. The patient does not have legal issues pending. The patient's source of income comes from social security. [employer]ybeterian and cultural practices have not been voiced at this time. The patient's greatest support comes from [facility] staff and this person will not be involved with the treatment. The patient has not been in an event described as horrible or outside the realm of ordinary life experience either currently or in the past.  The patient has not been a victim of sexual/physical abuse. Section VII - Other Areas of Clinical Concern  The highest grade achieved is not assessed with the overall quality of school experience being described as unknown. The patient is currently disabled and speaks [place] as a primary language. The patient has no communication impairments affecting communication. The patient's preference for learning can be described as: can read and write adequately.   The patient's hearing is normal.  The patient's vision is normal.      Bernie Sullivan MA

## 2023-03-13 LAB
ATRIAL RATE: 71 BPM
CALCULATED P AXIS, ECG09: 46 DEGREES
CALCULATED R AXIS, ECG10: 57 DEGREES
CALCULATED T AXIS, ECG11: 43 DEGREES
DIAGNOSIS, 93000: NORMAL
P-R INTERVAL, ECG05: 184 MS
Q-T INTERVAL, ECG07: 390 MS
QRS DURATION, ECG06: 84 MS
QTC CALCULATION (BEZET), ECG08: 423 MS
VENTRICULAR RATE, ECG03: 71 BPM

## 2023-03-13 PROCEDURE — 65220000003 HC RM SEMIPRIVATE PSYCH

## 2023-03-13 PROCEDURE — 74011250637 HC RX REV CODE- 250/637: Performed by: PSYCHIATRY & NEUROLOGY

## 2023-03-13 RX ORDER — QUETIAPINE FUMARATE 25 MG/1
50 TABLET, FILM COATED ORAL 2 TIMES DAILY
Status: DISCONTINUED | OUTPATIENT
Start: 2023-03-13 | End: 2023-03-17 | Stop reason: SDUPTHER

## 2023-03-13 RX ORDER — TRAZODONE HYDROCHLORIDE 150 MG/1
150 TABLET ORAL
COMMUNITY
End: 2023-03-20

## 2023-03-13 RX ORDER — QUETIAPINE FUMARATE 50 MG/1
50 TABLET, FILM COATED ORAL 2 TIMES DAILY
COMMUNITY
End: 2023-03-20

## 2023-03-13 RX ADMIN — QUETIAPINE FUMARATE 50 MG: 25 TABLET ORAL at 07:00

## 2023-03-13 RX ADMIN — TRAZODONE HYDROCHLORIDE 150 MG: 150 TABLET ORAL at 21:39

## 2023-03-13 RX ADMIN — QUETIAPINE FUMARATE 300 MG: 300 TABLET ORAL at 21:39

## 2023-03-13 RX ADMIN — VENLAFAXINE HYDROCHLORIDE 150 MG: 75 CAPSULE, EXTENDED RELEASE ORAL at 08:34

## 2023-03-13 RX ADMIN — QUETIAPINE FUMARATE 50 MG: 25 TABLET ORAL at 16:02

## 2023-03-13 NOTE — GROUP NOTE
IP  GROUP DOCUMENTATION INDIVIDUAL                                                                          Group Therapy Note    Date: 3/13/2023    Group Start Time: 5510  Group End Time: 7529  Group Topic: Recreational/Music Therapy    [facility] 2 BH NON ACUTE    Reed Gamma    IP [facility] GROUP DOCUMENTATION GROUP    Group Therapy Note    Facilitated leisure skills group to reinforce positive coping and to manage mood through music, social interaction, group activities and art task    Attendees: 6/9       Attendance: Did not attend    Additional Notes:  Encouraged but did not attend    Roya Weiner, [address]

## 2023-03-13 NOTE — GROUP NOTE
IP  GROUP DOCUMENTATION INDIVIDUAL                                                                          Group Therapy Note    Date: 3/13/2023    Group Start Time: 1800  Group End Time: 1900  Group Topic: Reflection/Relaxation    [facility] 2 BH NON ACUTE    Luis Hansen    IP  GROUP DOCUMENTATION GROUP    Group Therapy Note    Facilitated group to introduce the definition and benefits of diaphragmatic breathing and demonstration of relaxation technique as a positive way to manage anxiety and stress symptoms    Attendees: 6/9       Attendance: Did not attend    Additional Notes:  Encouraged but did not attend    KATHERINE Amador

## 2023-03-13 NOTE — PROGRESS NOTES
Problem: Psychosis  Goal: *STG: Decreased hallucinations  Outcome: Not Progressing Towards Goal  Reports AH      Problem: Suicide  Goal: *STG: Remains safe in hospital  Outcome: Progressing Towards Goal  Reports \"some\" SI, but is able to contract for safety.

## 2023-03-13 NOTE — BH NOTES
Client is pleasant and cooperative. Alert and oriented x 3. Appearance tidy. Good appetite. Denies feeling suicidal  or homicidal.He has been quiet and withdrawn in bed. No interaction noted between peers. Remains on close observation for safety.

## 2023-03-13 NOTE — GROUP NOTE
IP  GROUP DOCUMENTATION INDIVIDUAL                                                                          Group Therapy Note    Date: 3/13/2023    Group Start Time: 0930  Group End Time: 1271  Group Topic: Education Group - Inpatient    [facility] 2  NON ACUTE    Fadumo Velasquez    IP [address] GROUP DOCUMENTATION GROUP    Group Therapy Note    Facilitated group to introduce the definition of self-esteem and discuss information relating to creating steps to greater self-appreciation and recognizing symptoms of self-defeat    Attendees: 4/9       Attendance: Did not attend    Additional Notes:  Encouraged but did not attend    KATHERINE Bynum

## 2023-03-13 NOTE — BH NOTES
[address] [place] Initial Note:    Presented to [facility] ED endorsing SI with no plan and HI towards peers at [employer] to shoot them because they are triggering him; stated to accessor that he does have a firearm. Reports AH of voices telling him to harm himself. Non compliance with medications for 1.5 weeks. ; states that the homicidal ideations and depression are increasing. Patient is female to male transgender, uses the pronouns Him/He; prefers to be called \"Q\". Room is blocked. Patient is a voluntary admit and is under the care of Dr. Caleb Parry for Schizoaffective D/o with SI and HI. UDS is (+) for cocaine; reports smoking crack cocaine for the past 2 years and uses weekly. Reports occasional ETOH use with last drink 3/11/23 and doesn't see ETOH as an issue. Reports smoking cigarettes a pack daily and refuses a nicotine patch after requesting nicotine gum. Psych hx of Schizoaffective D/o and substance abuse. BSMART admission screening reports medical hx of GERD, iron deficiency anemia, back pain, and borderline intellectual functioning. Pleasant and cooperative during admission assessment; continues to endorse SI \"a little bit\", and is able to contract for safety. Denies HI and VH, but continues to report command AH of the voices telling him to harm himself. Does not appear to be responding to internal stimuli. Patient reports 2 SI attempts by overdose in 2021 and 2022. States last psych hospitalization was 2 months ago at [facility]. Denies discomfort and pain; observed eating and drinking during snack time. Medications were restarted by Dr. Caleb Parry. Patient reports being homeless prior to going to [facility] in [place] 2 weeks ago. Reports having 2 children and they are his reason to live and he is on disability. Patient observed resting quietly in bed at this time. Will continue to monitor for safety.

## 2023-03-13 NOTE — GROUP NOTE
HORTENC[facility]  GROUP DOCUMENTATION INDIVIDUAL                                                                          Group Therapy Note    Date: 3/13/2023    Group Start Time: 1130  Group End Time: 0697  Group Topic: Process Group - Inpatient    [facility] 2 [facility] ACUTE    [place]     IP [address] [facility] GROUP DOCUMENTATION GROUP    Group Therapy Note: Facilitator engaged the group in CBT and discussed symptoms and treatment for anxiety.      Attendees: 7       Attendance: Did not attend      Hunt Phy

## 2023-03-13 NOTE — BH NOTES
Nurse Note:    Patient received HS medications and Prn Trazodone; observed resting quietly with eyes closed. Will continue to monitor for safety.

## 2023-03-14 PROCEDURE — 65220000003 HC RM SEMIPRIVATE PSYCH

## 2023-03-14 PROCEDURE — 74011250637 HC RX REV CODE- 250/637: Performed by: PSYCHIATRY & NEUROLOGY

## 2023-03-14 RX ADMIN — QUETIAPINE FUMARATE 300 MG: 300 TABLET ORAL at 21:32

## 2023-03-14 RX ADMIN — QUETIAPINE FUMARATE 50 MG: 25 TABLET ORAL at 14:11

## 2023-03-14 RX ADMIN — TRAZODONE HYDROCHLORIDE 150 MG: 150 TABLET ORAL at 21:32

## 2023-03-14 RX ADMIN — ACETAMINOPHEN 650 MG: 325 TABLET ORAL at 17:54

## 2023-03-14 RX ADMIN — VENLAFAXINE HYDROCHLORIDE 150 MG: 75 CAPSULE, EXTENDED RELEASE ORAL at 08:11

## 2023-03-14 RX ADMIN — QUETIAPINE FUMARATE 50 MG: 25 TABLET ORAL at 06:42

## 2023-03-14 NOTE — PROGRESS NOTES
Problem: Suicide  Goal: *STG: Remains safe in hospital  Outcome: Progressing Towards Goal  Goal: *STG/LTG: Complies with medication therapy  Outcome: Progressing Towards Goal     Problem: General Medical Care Plan  Goal: *Skin integrity maintained  Outcome: Progressing Towards Goal     Problem: *Psychosis: Discharge Outcome  Goal: *Absent or Controlled Active Psychotic Symptoms  Outcome: Progressing Towards Goal    -pt remains safe; no self injurious behavior noted or reported.  -pt is med compliant.  -no c/o any skin discomfort.  -no active psychotic symptoms noted; pt resting quietly in bed

## 2023-03-14 NOTE — GROUP NOTE
IP  GROUP DOCUMENTATION INDIVIDUAL                                                                          Group Therapy Note    Date: 3/14/2023    Group Start Time: 0930  Group End Time: 2883  Group Topic: Psychological Group    [facility] CARE MANAGEMENT    [place] BSW IP  GROUP DOCUMENTATION GROUP    Group Therapy Note    The writer dicussed the difference between a healthy and toxic relationship and solutions patients can attempt to resolve conflict. Attendees: 7/9       Attendance: Did not attend    Additional Notes: The patient was encouraged to come to group but did not attend.      NATALIA Hicks

## 2023-03-14 NOTE — H&P
[email or web address]  PSYCH HISTORY AND PHYSICAL    Name:  Claudette Matias  MR#:  [id number]  :  1990  ACCOUNT #:  [de-identified]  ADMIT DATE:  2023    HISTORY OF PRESENT ILLNESS:  This is a 17-year-old female-to-male transgender person, admitted to behavioral health unit from one of the local emergency rooms for depression, suicidal thoughts, homicidal thoughts, command hallucination after using cocaine and alcohol yesterday. Apparently, had been on psych medication, but off for one and a half weeks. Doing crack cocaine. The patient says that he is at a program, could not tell what exactly the program is . He was supposed to be taking Seroquel 300 mg at night, 50 mg twice a day, venlafaxine 150 mg daily. He is also using two beers a day. Voices tell him to harm himself prior to coming here. PAST HISTORY:  For a while in [id number], he took an overdose to kill himself. TRAUMA HISTORY:  Unknown. FAMILY HISTORY:  Unknown. SUBSTANCE ABUSE HISTORY:  Crack cocaine. Smokes one pack of cigarettes a day, does not want a Nicoderm patch; apparently wants gum, Nicoderm. ALLERGIES TO MEDICATIONS:  NONE NOTED. MEDICAL HISTORY:  Unremarkable. MENTAL STATUS:  Average height, medium-built person, alert, verbal, affect flat, a little bit guarded. Some irritability. Had a command hallucination prior to coming here, telling to harm himself, which he did not want to do. Had homicidal thoughts, nothing directed at anybody, none at present. Flat affect, withdrawn, isolated. He wants help. IQ probably borderline. Insight is limited. Judgment is poor. Did seek hospitalization and treatment. Normal motor activity. At this time, no more hallucination, paranoia, a little bit guarded, defensive. DIAGNOSES:  AXIS I:  Major depression, recurrent, acute, sev sort of psychosis; cocaine abuse; THC abuse; nicotine abuse.   The psychotic symptom may be exacerbated by drug abuse.    DISPOSITION:  The patient needs an inpatient level of care, close observation as he made suicidal threat, homicidal threat. He did guarantee for safety. Medication reviewed, reconciled. Memory recall is fair. He will be attending individual therapy, group therapy, learning coping skill, stress management and try to establish some collateral information, what transpired and also try to establish support system, followup system. Focus on medication compliance. Consider long-acting depot injection.         Rogelio Castro MD      [id number]  D:  03/13/2023 23:12  T:  03/14/2023 4:27  JOB #:  [id number]

## 2023-03-14 NOTE — BH NOTES
The pt has been resting quietly in bed throughout the evening. The pt was out of the room for something to drink. The pt refused to have their vitals taken. The individual is quiet, soft spoken, guarded, and seems paranoid. The pt rated their anxiety a 3/10 and their depression a 6/10. The individual has vivid hallucinations, but did not elaborate on specifics. They continue to experience AH and is not able to decipher what is being said. The individual rated their SI a 2/10 and stated that they have the suicidal thoughts all the time. No plan voiced with their SI. The pt isolates, has a flat/sad affect, and is withdrawn. The pt appears to be preoccupied with their inner thoughts. The patience's appearance is appropriate. Their room is disorganized with multiple items on the floor. The individual has limited interaction with the staff and no interaction with their peers. No c/o pain or discomfort. The pt is med compliant and received prn Trazodone for sleep. Respirations are quiet and unlabored. The pt remains A+O and ambulates independently. The pt has been resting quietly in bed without any distress. Respirations are quiet and unlabored. The pt remains on close observation for safety.

## 2023-03-14 NOTE — BH NOTES
PSA PART II ADDITIONAL INFORMATION        Access To Fire Arms: Yes: Comment: The patient doesn't own any firearms but reports having access. Substance Use: YES    Last Use: 3/11/2023    Type of Substance: Alcohol use and  Cocaine use    Frequency of Use: Every other day     Request to See : NO    If yes, notified : NO    Release of Information Signed: YES    Release of Information Signed For: [facility]     Guardian/POA: NO    The patient signed and reviewed her treatment plan.

## 2023-03-14 NOTE — PROGRESS NOTES
Problem: Suicide  Goal: *STG/LTG: No longer expresses self destructive or suicidal thoughts    Note: Affect flat. Good eye to eye contact. Rated her depression level 8/10 and her anxiety level 3/10. Denied having thoughts to self harm; agreed to let staff know, if she does. Up to the day room, for bfkt; consumed 100% and immediately returned to bed. Consumed 100% of lunch, in the day room. Consumed 100% of lunch and dinner, in the day room. Problem: Suicide  Goal: *STG/LTG: Complies with medication therapy    Note: Compliant with scheduled med; asked appropriate questions. Problem: Suicide  Goal: *STG: Attends activities and groups    Note: Encouraged to attend groups; however, she remained in bed. .      Problem: Suicide  Goal: *STG: Remains safe in hospital    Note: Q 15 mins checks maintained, for safety.

## 2023-03-14 NOTE — BH NOTES
PSYCHOSOCIAL ASSESSMENT  :Patient identifying info:   Lovey Ganser is a 26 y.o., adult admitted 3/12/2023  6:38 PM     Presenting problem and precipitating factors: The patient reported that she has been experiencing an increase in depression rating it a 7/10 and endorsed HI towards people in general. She rated her anxiety a 3/10 and reported that she didn't want to speak any further with the writer regarding what's going on in her life. Mental status assessment: The patient denied SI/HI/AVH at this time and was oriented x4, his affect was guarded with the writer but he responded to all questions asked. His appearance was disheveled and appeared to be in need of grooming. The patient identifies as male and goes by Mr. Taurus Campoverde: listening to music, cooking, and poetry     Collateral information: [facility]     Current psychiatric /substance abuse providers and contact info: The patient reports she has access to a therapist and psychiatrist through [facility]     Previous psychiatric/substance abuse providers and response to treatment: The patient has a history of hospitalization and was last at [facility] 2 months ago for stress     Family history of mental illness or substance abuse: The patient denied     Substance abuse history:  Alcohol (every other day, last use Saturday 3/11) + Cocaine (every other day, last usage 3/11)   Social History     Tobacco Use    Smoking status: Every Day     Types: Cigarettes    Smokeless tobacco: Never   Substance Use Topics    Alcohol use: Not Currently       History of biomedical complications associated with substance abuse: The patient reported her last attempted OD was last year via prescription drugs     Patient's current acceptance of treatment or motivation for change: \"dealing with my mind\"     Family constellation: The patient denied     Is significant other involved?    The patient denied     Describe support system: \"myself\"     Describe living arrangements and home environment: The patient reported being set up in hotels through [employer] issues: The patient denied   Hospital Problems  Date Reviewed: 2020            Codes Class Noted POA    Schizoaffective disorder ([facility] ICD-10-CM: F25.9  ICD-9-CM: 295.70  3/12/2023 Unknown        Homicidal ideation ICD-10-CM: R45.850  ICD-9-CM: V62.85  3/12/2023 Unknown           Trauma history: The patient denied     Legal issues: The patient denied     History of  service: The patient denied     Financial status: SSI: $900    Uatsdin/cultural factors: The patient denied     Education/work history: 11th grade     Have you been licensed as a health care professional (current or ):  The patient denied     Leisure and recreation preferences:  listening to music, cooking, and poetry     Describe coping skills: listening to music, cooking, and poetry     Litzy Kirby  3/14/2023

## 2023-03-15 LAB
BASOPHILS # BLD: 0 K/UL (ref 0–0.1)
BASOPHILS NFR BLD: 0 % (ref 0–1)
DIFFERENTIAL METHOD BLD: ABNORMAL
EOSINOPHIL # BLD: 0.2 K/UL (ref 0–0.4)
EOSINOPHIL NFR BLD: 3 % (ref 0–7)
ERYTHROCYTE [DISTWIDTH] IN BLOOD BY AUTOMATED COUNT: 17 % (ref 11.5–14.5)
HCT VFR BLD AUTO: 30.9 % (ref 35–47)
HGB BLD-MCNC: 9.2 G/DL (ref 11.5–16)
IMM GRANULOCYTES # BLD AUTO: 0 K/UL (ref 0–0.04)
IMM GRANULOCYTES NFR BLD AUTO: 0 % (ref 0–0.5)
LYMPHOCYTES # BLD: 1.7 K/UL (ref 0.8–3.5)
LYMPHOCYTES NFR BLD: 23 % (ref 12–49)
MCH RBC QN AUTO: 23 PG (ref 26–34)
MCHC RBC AUTO-ENTMCNC: 29.8 G/DL (ref 30–36.5)
MCV RBC AUTO: 77.3 FL (ref 80–99)
MONOCYTES # BLD: 0.6 K/UL (ref 0–1)
MONOCYTES NFR BLD: 8 % (ref 5–13)
NEUTS SEG # BLD: 4.7 K/UL (ref 1.8–8)
NEUTS SEG NFR BLD: 66 % (ref 32–75)
NRBC # BLD: 0 K/UL (ref 0–0.01)
NRBC BLD-RTO: 0 PER 100 WBC
PLATELET # BLD AUTO: 488 K/UL (ref 150–400)
PMV BLD AUTO: 8.6 FL (ref 8.9–12.9)
RBC # BLD AUTO: 4 M/UL (ref 3.8–5.2)
TSH SERPL DL<=0.05 MIU/L-ACNC: 0.02 UIU/ML (ref 0.36–3.74)
WBC # BLD AUTO: 7.2 K/UL (ref 3.6–11)

## 2023-03-15 PROCEDURE — 84439 ASSAY OF FREE THYROXINE: CPT

## 2023-03-15 PROCEDURE — 83540 ASSAY OF IRON: CPT

## 2023-03-15 PROCEDURE — 36415 COLL VENOUS BLD VENIPUNCTURE: CPT

## 2023-03-15 PROCEDURE — 85025 COMPLETE CBC W/AUTO DIFF WBC: CPT

## 2023-03-15 PROCEDURE — 65220000003 HC RM SEMIPRIVATE PSYCH

## 2023-03-15 PROCEDURE — 74011250637 HC RX REV CODE- 250/637: Performed by: PSYCHIATRY & NEUROLOGY

## 2023-03-15 PROCEDURE — 84443 ASSAY THYROID STIM HORMONE: CPT

## 2023-03-15 PROCEDURE — 82746 ASSAY OF FOLIC ACID SERUM: CPT

## 2023-03-15 RX ADMIN — VENLAFAXINE HYDROCHLORIDE 150 MG: 75 CAPSULE, EXTENDED RELEASE ORAL at 08:38

## 2023-03-15 RX ADMIN — QUETIAPINE FUMARATE 50 MG: 25 TABLET ORAL at 06:18

## 2023-03-15 RX ADMIN — QUETIAPINE FUMARATE 50 MG: 25 TABLET ORAL at 15:52

## 2023-03-15 RX ADMIN — QUETIAPINE FUMARATE 300 MG: 300 TABLET ORAL at 21:08

## 2023-03-15 RX ADMIN — TRAZODONE HYDROCHLORIDE 150 MG: 150 TABLET ORAL at 21:08

## 2023-03-15 NOTE — PROGRESS NOTES
Progress Note  Date:3/14/2023       Room:[facility]  Patient Ricky Metcalf     YOB: 1990     Age:33 y.o. Subjective    Subjective   Review of Systems  Objective         Vitals Last 24 Hours:  TEMPERATURE:  Temp  Av.3 °F (36.8 °C)  Min: 97.7 °F (36.5 °C)  Max: 98.9 °F (37.2 °C)  RESPIRATIONS RANGE: Resp  Av  Min: 16  Max: 18  PULSE OXIMETRY RANGE: No data recorded  PULSE RANGE: Pulse  Av  Min: 74  Max: 88  BLOOD PRESSURE RANGE: Systolic (72CZH), KXX:568 , Min:122 , XLF:441   ; Diastolic (67SJA), VXE:97, Min:70, Max:74    I/O (24Hr): No intake or output data in the 24 hours ending 23 2306  Objective  Labs/Imaging/Diagnostics    Labs:  CBC:  Recent Labs     23  1224   WBC 7.0   RBC 3.82   HGB 8.8*   HCT 29.5*   MCV 77.2*   RDW 17.4*   *     CHEMISTRIES:  Recent Labs     23  1224      K 4.1      CO2 25   BUN 9   CA 8.7   PT/INR:No results for input(s): INR, INREXT in the last 72 hours. No lab exists for component: PROTIME  APTT:No results for input(s): APTT in the last 72 hours. LIVER PROFILE:  Recent Labs     23  1224   AST 11*   ALT 16     Lab Results   Component Value Date/Time    ALT (SGPT) 16 2023 12:24 PM    AST (SGOT) 11 (L) 2023 12:24 PM    Alk. phosphatase 74 2023 12:24 PM    Bilirubin, total 0.3 2023 12:24 PM       Imaging Last 24 Hours:  No results found. Assessment//Plan   Active Problems:    Schizoaffective disorder ([facility] [facility] (3/12/2023)      Homicidal ideation (3/12/2023)      Assessment & Plan patient case discussed in the treatment team patient seen for follow-up chart reviewed patient isolated himself in the bed dishevel untidy and her close also in the room.   80 food items regarding irritable encouraged to take care of personal hygiene and also attempting to clean the groups continued inpatient level of care indicated no side effects noted compliant with medication no side effects    Electronically signed by Agapito Hurley MD on 3/14/2023 at 11:06 PM

## 2023-03-15 NOTE — BH NOTES
Behavioral Health Treatment Team Note     Patient goal(s) for today: \"to think positive\"  Treatment team focus/goals: continue medication management, group therapy, decrease anxiety/depression and provide a safe discharge    Progress note: Pt was laying in bed with the covers close to his face. He did not respond when writer asked about SI/HI but shared that [address] is constant and will never go away. Pt shared that the voices get stronger when he is not doing anything and tries to keep busy to lower them. Pt shared that he likes to color, listen to music and do crosswords to keep his mind off the voices. He appeared worried that he will lose his placement with [facility] if he is in the hospital to long. Writer reached out to [facility] and left a VM to have his  her to coordinate care.  An inpatient level of care is needed to further stabilize pt and to provide a safe discharge    LOS:  3  Expected LOS: 5-7 days    Insurance info/prescription coverage:  [facility]  Date of last family contact:  3.15.23   Family requesting physician contact today:  no  Discharge plan:  to stabilize pt and return to [address] in the home:  no   Outpatient provider(s):  [facility]    Participating treatment team members: Abby Bañuelos, * (assigned SW), Jann Sena LMSW

## 2023-03-15 NOTE — GROUP NOTE
HORTENCIA  GROUP DOCUMENTATION INDIVIDUAL                                                                          Group Therapy Note    Date: 3/15/2023    Group Start Time: 0930  Group End Time: 1000  Group Topic: Education Group - Inpatient    [facility] [address] [facility] GROUP DOCUMENTATION GROUP    Group Therapy Note: Facilitator assisted patients with completing their safety plans.      Attendees: 7       Attendance: Did not attend      Hunt Phy

## 2023-03-15 NOTE — BH NOTES
Patient was pleasant, cooperative, but overly open to affection to writer. Patient ran up to writer upon writer introducing herself to all of the patients in the group room and asked  writer for a hug, writer stepped back and explained that no hugging was allowed on the unit and he then asked for a fist bump which was done. Patient rated his depression 8/10, anxiety 3/10, and also positive for SI, HI and auditory hallucinations. Patient said he had no plant for the Suicide, and would not tell writer who the HI was towards but said some were here and some were outside of this facility. Patient refused to elaborate and side \"It's not like I'm going to do anything to anyone here\". Patient was saying he had command hallucinations telling him that \"I should leave this place\". Patient was med compliant, and trazodone for sleep was requested and administered. Patient has been sleeping well, with no signs of distress noted, respirations regular and unlabored. Will continue to monitor patient every 15 mins as per unit protocol.

## 2023-03-15 NOTE — PROGRESS NOTES
Problem: Suicide  Goal: *STG: Remains safe in hospital  Outcome: Progressing Towards Goal  Goal: *STG: Seeks staff when feelings of self harm or harm towards others arise  Outcome: Progressing Towards Goal  Goal: *STG/LTG: Complies with medication therapy  Outcome: Progressing Towards Goal     Problem: Psychosis  Goal: *STG: Remains safe in hospital  Outcome: Progressing Towards Goal     Problem: Suicide  Goal: *STG/LTG: No longer expresses self destructive or suicidal thoughts  Outcome: Not Progressing Towards Goal     Problem: Psychosis  Goal: *STG: Decreased hallucinations  Outcome: Not Progressing Towards Goal  Goal: *STG: Patient will develop strategies to regulate emotions and corresponding behaviors  Outcome: Not Progressing Towards Goal  Goal: *STG: Accept constructive criticism without injury or isolation  Outcome: Not Progressing Towards Goal

## 2023-03-15 NOTE — BH NOTES
Client is pleasant and cooperative. Alert and oriented x 3. Appearance is neat and clean. Client has a good appetite and reports sleeping well. Denies feeling suicidal at this time. She has been attending all sheduled groups and unit activities. Needy at times. Mumbles to herself. Remains on close observation for safety.

## 2023-03-15 NOTE — BH NOTES
Writer reached out and spoke with [facility] and they said that pt is able to return for treatment when he is ready to discharge

## 2023-03-15 NOTE — GROUP NOTE
IP  GROUP DOCUMENTATION INDIVIDUAL                                                                          Group Therapy Note    Date: 3/14/2023    Group Start Time: 6973  Group End Time: 1930  Group Topic: Recreational/Music Therapy    [facility] 2  NON ACUTE    Eliseo Juan Diego    IP [address] [facility] GROUP DOCUMENTATION GROUP    Group Therapy Note    Attendees: 7/10    Facilitated structured leisure skills group to introduce healthy leisure skills as positive way to cope and manage mood. Attendance: Attended    Patient's Goal:  STG/LTG: Demonstrates improved social functioning by responding appropriately to staff      Interventions/techniques: Art integration and Supported    Follows Directions: Followed directions    Interactions: Interacted appropriately    Mental Status: Calm    Behavior/appearance: Attentive    Goals Achieved: Able to engage in interactions and Able to listen to others      Additional Notes: Attended group and listened to songs with peers. Was receptive to intervention and responded to prompts from staff. Attended entire session and was attentive during group. Verbalized enjoyment.      Rey Guerrero

## 2023-03-15 NOTE — GROUP NOTE
IP  GROUP DOCUMENTATION INDIVIDUAL                                                                          Group Therapy Note    Date: 3/15/2023    Group Start Time: 1115  Group End Time: 1200  Group Topic: Process Group - Inpatient    [facility] 2 [facility] ACUTE    Gladys Eis    IP [address] [facility] [address] GROUP DOCUMENTATION GROUP    Group Therapy Note: Facilitator engaged the group in a team building exercise and provided a handout and education on Protective factors and SMART goals. Attendees: 7       Attendance: Attended    Patient's Goal:  To attend groups    Interventions/techniques: Informed, Validated, and Supported    Follows Directions: Followed directions    Interactions: Interacted appropriately    Mental Status: Calm and Congruent    Behavior/appearance: Attentive and Cooperative    Goals Achieved: Able to engage in interactions, Able to listen to others, and Able to give feedback to another      Additional Notes:   Pt openly shared ways to increase protective factors.      Bushra Perez

## 2023-03-16 LAB
FOLATE SERPL-MCNC: 10.3 NG/ML (ref 5–21)
IRON SATN MFR SERPL: 7 % (ref 20–50)
IRON SERPL-MCNC: 36 UG/DL (ref 35–150)
T4 FREE SERPL-MCNC: 1.1 NG/DL (ref 0.8–1.5)
TIBC SERPL-MCNC: 503 UG/DL (ref 250–450)
VIT B12 SERPL-MCNC: 372 PG/ML (ref 193–986)

## 2023-03-16 PROCEDURE — 65220000003 HC RM SEMIPRIVATE PSYCH

## 2023-03-16 PROCEDURE — 74011250637 HC RX REV CODE- 250/637: Performed by: PSYCHIATRY & NEUROLOGY

## 2023-03-16 RX ADMIN — QUETIAPINE FUMARATE 300 MG: 300 TABLET ORAL at 21:28

## 2023-03-16 RX ADMIN — VENLAFAXINE HYDROCHLORIDE 150 MG: 75 CAPSULE, EXTENDED RELEASE ORAL at 08:56

## 2023-03-16 RX ADMIN — ACETAMINOPHEN 650 MG: 325 TABLET ORAL at 20:15

## 2023-03-16 RX ADMIN — QUETIAPINE FUMARATE 50 MG: 25 TABLET ORAL at 16:17

## 2023-03-16 RX ADMIN — QUETIAPINE FUMARATE 50 MG: 25 TABLET ORAL at 08:56

## 2023-03-16 RX ADMIN — ACETAMINOPHEN 650 MG: 325 TABLET ORAL at 08:56

## 2023-03-16 RX ADMIN — ACETAMINOPHEN 650 MG: 325 TABLET ORAL at 16:17

## 2023-03-16 RX ADMIN — TRAZODONE HYDROCHLORIDE 150 MG: 150 TABLET ORAL at 21:28

## 2023-03-16 NOTE — PROGRESS NOTES
Problem: Suicide  Goal: *STG: Remains safe in hospital  Outcome: Progressing Towards Goal     Problem: Suicide  Goal: *STG/LTG: Complies with medication therapy  Outcome: Progressing Towards Goal     Problem: Suicide  Goal: *STG/LTG: No longer expresses self destructive or suicidal thoughts  Outcome: Progressing Towards Goal

## 2023-03-16 NOTE — GROUP NOTE
IP  GROUP DOCUMENTATION INDIVIDUAL                                                                          Group Therapy Note    Date: 3/16/2023    Group Start Time: 0930  Group End Time: 3349  Group Topic: Education Group - Inpatient    [facility] 2  NON ACUTE    Sharon Ramos    IP [address] [facility] [address] GROUP DOCUMENTATION GROUP    Group Therapy Note    Facilitated group to introduce information relating to  the grieving process / consisting of different types of grief, the symptoms and coping strategies    Attendees: 9/11       Attendance: Attended    Patient's Goal:  Attend group daily     Interventions/techniques: Informed and Supported    Follows Directions: Followed directions    Interactions: Interacted appropriately    Mental Status: Calm    Behavior/appearance: Cooperative    Goals Achieved: Able to engage in interactions, Able to listen to others, and Able to self-disclose      Additional Notes:  Receptive to information discussed and engaged.   Was able to recognize the symptoms and shared he  is currently in the stage of  complicated grief    Yehuda Belle [place]

## 2023-03-16 NOTE — BH NOTES
Behavioral Health Treatment Team Note     Patient goal(s) for today: \"to leave\"  Treatment team focus/goals: continue medication management, group therapy and provide a safe discharge    Progress note: Pt presented with a flat, aggressive affect and congruent mood. Pt approached the writer closely not providing personal space. Pt stated \"did you call\", writer assured him that she spoke with Ms. Maria Antonia Majano and he is able to return to the [facility]. Pt looked hard into the writer's eye not blinking when speaking with her. Pt demanded to leave tomorrow and writer informed her the the program likes to organize discharge the day prior. Pt then stated \"I will leave today\". Writer acknowledged the pts feels and told her she would keep her informed. Pt denied any SI/HI/VH at the time and stated AH is better today. Later in the day pt yelled from the day room for the writer. When writer entered the room pt said that the writer must \"give me 20 dollars\" and was hyper focused on the writer giving him money. Writer informed the pt she does not have money and that is not an appropriate conversation. Pt then asked for crosswords and that he does not feel ready to go back. An inpatient level of care is needed to further stabilize pt    LOS:  4  Expected LOS: 5-7 days    Insurance info/prescription coverage:  [place]  Date of last family contact: 3.15.23   Family requesting physician contact today:  no  Discharge plan:  to stabilize pt and return to [address] [facility]  Guns in the home:  no   Outpatient provider(s):  [facility]    Participating treatment team members: Marrie Osgood, * (assigned SW), Salvatore Dillard LMSW

## 2023-03-16 NOTE — GROUP NOTE
IP  GROUP DOCUMENTATION INDIVIDUAL                                                                          Group Therapy Note    Date: 3/16/2023    Group Start Time: 3116  Group End Time: 6138  Group Topic: Recreational/Music Therapy    [facility] 2  NON ACUTE    Dalia Medeiros    IP [address] [facility] GROUP DOCUMENTATION GROUP    Group Therapy Note    Facilitated leisure skills group to reinforce positive coping and to manage mood through music, social interaction, group activities and art task    Attendees: 7/10       Attendance: Attended    Patient's Goal:  Attend group daily     Interventions/techniques: Art integration and Supported    Follows Directions: Followed directions    Interactions: Interacted appropriately    Mental Status: Calm    Behavior/appearance: Cooperative    Goals Achieved: Able to engage in interactions and Able to listen to others      Additional Notes:  Receptive to listening to music and songs he selected. Declined to work on leisure task.  Interacted with peers and staff     Win Segal

## 2023-03-16 NOTE — PROGRESS NOTES
Progress Note  Date:3/15/2023       Room:[facility]  Patient Ether Codding     YOB: 1990     Age:33 y.o. Subjective    Subjective   Review of Systems  Objective         Vitals Last 24 Hours:  TEMPERATURE:  Temp  Av.8 °F (37.1 °C)  Min: 98.6 °F (37 °C)  Max: 99 °F (37.2 °C)  RESPIRATIONS RANGE: Resp  Avg: 15  Min: 12  Max: 18  PULSE OXIMETRY RANGE: No data recorded  PULSE RANGE: Pulse  Av.5  Min: 65  Max: 74  BLOOD PRESSURE RANGE: Systolic (13WVL), YJF:06 , Min:86 , WUT:506   ; Diastolic (73NUZ), BAY:95, Min:43, Max:63    I/O (24Hr): No intake or output data in the 24 hours ending 03/15/23 2318  Objective  Labs/Imaging/Diagnostics    Labs:  CBC:  Recent Labs     03/15/23  1508   WBC 7.2   RBC 4.00   HGB 9.2*   HCT 30.9*   MCV 77.3*   RDW 17.0*   *     CHEMISTRIES:No results for input(s): NA, K, CL, CO2, BUN, CA, PHOS, MG in the last 72 hours. No lab exists for component: CREATININE, GLUCOSEPT/INR:No results for input(s): INR, INREXT in the last 72 hours. No lab exists for component: PROTIME  APTT:No results for input(s): APTT in the last 72 hours. LIVER PROFILE:No results for input(s): AST, ALT in the last 72 hours. No lab exists for component: Erminio Estimable, ALKPHOS  Lab Results   Component Value Date/Time    ALT (SGPT) 16 2023 12:24 PM    AST (SGOT) 11 (L) 2023 12:24 PM    Alk. phosphatase 74 2023 12:24 PM    Bilirubin, total 0.3 2023 12:24 PM       Imaging Last 24 Hours:  No results found.   Assessment//Plan   Active Problems:    Schizoaffective disorder ([address] (3/12/2023)      Homicidal ideation (3/12/2023)      Assessment & Plan patient seen for follow-up chart reviewed discussed in treatment team patient out of his room attending the groups indicated that he is eating in the middle of the peers patient not suicidal or homicidal he is anxious about it if he stays too long he madea by program staff called and they are receptive to have a backup time comes. Compliant with medication nutrition personal hygiene and grooming mood bright thank you continue to monitor him.   Patient is stabilized and safe    Electronically signed by Yana Perry MD on 3/15/2023 at 11:18 PM

## 2023-03-16 NOTE — BH NOTES
Pt has been interactive with staff and peers this shift. Pt has been medication and group compliant this shift. Pt denied Depression, but stated that Anxiety was 5/10. No behavioral issues noted. Staff will continue to monitor.

## 2023-03-16 NOTE — GROUP NOTE
IP  GROUP DOCUMENTATION INDIVIDUAL                                                                          Group Therapy Note    Date: 3/16/2023    Group Start Time: 1430  Group End Time: 3724  Group Topic: Relapse Prevention/Role Play Group    [facility] CARE MANAGEMENT    Di Rock BSW    [facility] GROUP DOCUMENTATION GROUP    Group Therapy Note    The writer discussed the importance of occupying your time to avoid triggers and or boredom that could lead to relapse. Attendees: 2/8       Attendance: Did not attend    Additional Notes: The patient was encouraged to come to group but did not attend.      NATALIA Caro

## 2023-03-16 NOTE — GROUP NOTE
IP  GROUP DOCUMENTATION INDIVIDUAL                                                                          Group Therapy Note    Date: 3/16/2023    Group Start Time: 1115  Group End Time: 1200  Group Topic: Process Group - Inpatient    [facility] CARE MANAGEMENT    Di Rock BSW    [facility] GROUP DOCUMENTATION GROUP    Group Therapy Note    The writer defined and discussed forgiveness with the group. Attendees: 10/11       Attendance: Attended    Patient's Goal:  Attend Group     Interventions/techniques: Provide feedback    Follows Directions: Followed directions    Interactions: Interacted appropriately    Mental Status: Calm    Behavior/appearance: Attentive, Disheveled, and Grooming impaired    Goals Achieved: Able to engage in interactions, Able to listen to others, Able to give feedback to another, Able to reflect/comment on own behavior, Able to receive feedback, and Able to self-disclose      Additional Notes: The patient shared that he has been hurt by many people close to him but voiced that he keeps his distance from them to keep himself protected.      NATALIA King

## 2023-03-16 NOTE — BH NOTES
Patient approached nurse and requested tylenol for pain. Writer acknowledged patient request and explained morning medications would be administered shortly. Approximately 5 minutes later patient stated \"I'm waiting and I am in pain\". RN acknowledged patient request and explained medications were being pulled. Patient proceeded to stand at nurses' station at window. Writer went to medication room and patient followed nurse and stood at window looking into medication room at [facility]. Patient was told to move away from door/window as violation of patient privacy/HIPAA. When writer approached patent with morning meds, patient refused. \"I'll just deal with the pain\". Patient approached nursing station 5 minutes later and requested for another nurse to administer medications. Patient accepted scheduled medications. Will continue to monitor.

## 2023-03-16 NOTE — GROUP NOTE
IP  GROUP DOCUMENTATION INDIVIDUAL                                                                          Group Therapy Note    Date: 3/15/2023    Group Start Time: 8381  Group End Time: 1930  Group Topic: Recreational/Music Therapy    [facility] 2  NON ACUTE    Joshua Agrawal    IP [facility] GROUP DOCUMENTATION GROUP    Group Therapy Note    Attendees: 6/9    Facilitated structured leisure skills group to introduce healthy leisure skills as positive way to cope and manage mood. Attendance: Attended    Patient's Goal:  STG/LTG: Demonstrates improved social functioning by responding appropriately to staff      Interventions/techniques: Art integration and Supported    Follows Directions: Followed directions    Interactions: Interacted appropriately    Mental Status: Calm    Behavior/appearance: Attentive    Goals Achieved: Able to engage in interactions and Able to listen to others      Additional Notes: Attended group and listened to songs with peers. Was receptive to intervention and responded to prompts from staff.  Attended entire session and was attentive during group    Mckayla Golden, [address]

## 2023-03-17 PROCEDURE — 65220000003 HC RM SEMIPRIVATE PSYCH

## 2023-03-17 PROCEDURE — 74011250637 HC RX REV CODE- 250/637: Performed by: PSYCHIATRY & NEUROLOGY

## 2023-03-17 RX ORDER — QUETIAPINE FUMARATE 25 MG/1
50 TABLET, FILM COATED ORAL 2 TIMES DAILY WITH MEALS
Status: DISCONTINUED | OUTPATIENT
Start: 2023-03-17 | End: 2023-03-20 | Stop reason: HOSPADM

## 2023-03-17 RX ADMIN — QUETIAPINE FUMARATE 50 MG: 25 TABLET ORAL at 08:45

## 2023-03-17 RX ADMIN — QUETIAPINE FUMARATE 300 MG: 300 TABLET ORAL at 21:06

## 2023-03-17 RX ADMIN — TRAZODONE HYDROCHLORIDE 150 MG: 150 TABLET ORAL at 21:26

## 2023-03-17 RX ADMIN — ACETAMINOPHEN 650 MG: 325 TABLET ORAL at 08:48

## 2023-03-17 RX ADMIN — VENLAFAXINE HYDROCHLORIDE 150 MG: 75 CAPSULE, EXTENDED RELEASE ORAL at 08:45

## 2023-03-17 RX ADMIN — QUETIAPINE FUMARATE 50 MG: 25 TABLET ORAL at 17:20

## 2023-03-17 NOTE — BH NOTES
Behavioral Health Treatment Team Note     Patient goal(s) for today: \"to do some of the crosswords\"  Treatment team focus/goals: continue medication management, group therapy and provide a safe discharge    Progress note: Pt was laying in bed talking when the writer went in. He said that the writer woke her up and asked if he was talking in his sleep. Pt presented in a bright mood and congruent mood. Pt denied any SI/HI/VH and reported that he endorses AH but they are not overwhelming. Pt continued to laugh and made jokes about talking in his sleep. Writer informed the pt that she spoke with Ms. Stevo Lindquist and that everything is set up for Monday for him to return to [facility]. Pt stated that he is \"ready to get back on it\" and is motivated to receive substance use treatment. An inpatient level of care is needed to coordinate a safe discharge due to high risk of relapse. LOS:  5  Expected LOS: 3.20.23    Insurance info/prescription coverage:  [employer]   Date of last family contact:  3.17.23  Family requesting physician contact today:  no  Discharge plan:  [facility]  Guns in the home:  no   Outpatient provider(s):  [facility]    Participating treatment team members: Kennedi Ch, * (assigned SW), Dylon Luna LMSW

## 2023-03-17 NOTE — GROUP NOTE
HORTENCIA  GROUP DOCUMENTATION INDIVIDUAL                                                                          Group Therapy Note    Date: 3/17/2023    Group Start Time: 1055  Group End Time: 1115  Group Topic: Nursing    [facility] 2 BEHA TH ACUTE    Jocelyn Chou LPN IP  GROUP DOCUMENTATION GROUP    Group Therapy Note    Attendees: 5/10       Attendance: Attended    Patient's Goal:  Id facts & Myths about Mental illness. Interventions/techniques: Follows Directions:     Interactions:     Mental Status:   Behavior/appearance:     Goals Achieved: Additional Notes: Patient  was  invited did not attend.     Teja Oleary LPN

## 2023-03-17 NOTE — PROGRESS NOTES
Progress Note  Date:3/16/2023       Room:[facility]  Patient Naila Martinez     YOB: 1990     Age:33 y.o. Subjective    Subjective   Review of Systems  Objective         Vitals Last 24 Hours:  TEMPERATURE:  Temp  Av.5 °F (36.9 °C)  Min: 98.2 °F (36.8 °C)  Max: 98.7 °F (37.1 °C)  RESPIRATIONS RANGE: Resp  Av  Min: 18  Max: 18  PULSE OXIMETRY RANGE: SpO2  Av %  Min: 100 %  Max: 100 %  PULSE RANGE: Pulse  Av.5  Min: 71  Max: 94  BLOOD PRESSURE RANGE: Systolic (85OGM), RBS:965 , Min:107 , GBW:634   ; Diastolic (38CXY), DANDRE:16, Min:53, Max:69    I/O (24Hr): No intake or output data in the 24 hours ending 23 2340  Objective  Labs/Imaging/Diagnostics    Labs:  CBC:  Recent Labs     03/15/23  1508   WBC 7.2   RBC 4.00   HGB 9.2*   HCT 30.9*   MCV 77.3*   RDW 17.0*   *     CHEMISTRIES:No results for input(s): NA, K, CL, CO2, BUN, CA, PHOS, MG in the last 72 hours. No lab exists for component: CREATININE, GLUCOSEPT/INR:No results for input(s): INR, INREXT in the last 72 hours. No lab exists for component: PROTIME  APTT:No results for input(s): APTT in the last 72 hours. LIVER PROFILE:No results for input(s): AST, ALT in the last 72 hours. No lab exists for component: Jamas Brooke, ALKPHOS  Lab Results   Component Value Date/Time    ALT (SGPT) 16 2023 12:24 PM    AST (SGOT) 11 (L) 2023 12:24 PM    Alk. phosphatase 74 2023 12:24 PM    Bilirubin, total 0.3 2023 12:24 PM       Imaging Last 24 Hours:  No results found.   Assessment//Plan   Active Problems:    Schizoaffective disorder ([address] (3/12/2023)      Homicidal ideation (3/12/2023)      Assessment & Plan    Electronically signed by Nae Barbosa MD on 3/16/2023 at 11:40 PM

## 2023-03-17 NOTE — BH NOTES
Patient  refused to allow staff to obtain vitals signs. was up for breakfast. Pt.accepted the medications. Pt. has c/o menstrual cramps. Pt. received Tylenol. Pt.went to bed after getting pads, body wash. was sleeping during the a.m. groups. Pt. has been up for lunch. Pt,report cramps w Patient denies any feeling of SI. Patient  reports  always HI . \"Q \"declined to say a name. Pt. Reports having hallucinations last night, Seeing \"Claribel Mac \"walking thru  the wall of  the room last night. Pt. Medication has been medication compliant. Pt has been safe from self harm. Pt remains free of falls. Pt. Has been walking bare footed all day. She has been encouraged to wear the skid guards that she was given . Remains on close observation.

## 2023-03-17 NOTE — BH NOTES
Pt currently denies SI,HI, A/V hallucinations, Anxiety, or Depression. Pt remains medication and meal compliant. No behavioral issues noted. Pt c/o menstrual cramping and received Tylenol per request along with her night medications. Staff will continue to monitor.

## 2023-03-17 NOTE — PROGRESS NOTES
Progress Note  Date:3/16/2023       Room:[facility]  Patient Cristy Sung     YOB: 1990     Age:33 y.o. Subjective    Subjective   Review of Systems  Objective         Vitals Last 24 Hours:  TEMPERATURE:  Temp  Av.5 °F (36.9 °C)  Min: 98.2 °F (36.8 °C)  Max: 98.7 °F (37.1 °C)  RESPIRATIONS RANGE: Resp  Av  Min: 18  Max: 18  PULSE OXIMETRY RANGE: SpO2  Av %  Min: 100 %  Max: 100 %  PULSE RANGE: Pulse  Av.5  Min: 71  Max: 94  BLOOD PRESSURE RANGE: Systolic (35TQB), VMN:269 , Min:107 , MJW:049   ; Diastolic (56SSE), IZJ:50, Min:53, Max:69    I/O (24Hr): No intake or output data in the 24 hours ending 23 2341  Objective  Labs/Imaging/Diagnostics    Labs:  CBC:  Recent Labs     03/15/23  1508   WBC 7.2   RBC 4.00   HGB 9.2*   HCT 30.9*   MCV 77.3*   RDW 17.0*   *     CHEMISTRIES:No results for input(s): NA, K, CL, CO2, BUN, CA, PHOS, MG in the last 72 hours. No lab exists for component: CREATININE, GLUCOSEPT/INR:No results for input(s): INR, INREXT in the last 72 hours. No lab exists for component: PROTIME  APTT:No results for input(s): APTT in the last 72 hours. LIVER PROFILE:No results for input(s): AST, ALT in the last 72 hours. No lab exists for component: Dyke Munda, ALKPHOS  Lab Results   Component Value Date/Time    ALT (SGPT) 16 2023 12:24 PM    AST (SGOT) 11 (L) 2023 12:24 PM    Alk. phosphatase 74 2023 12:24 PM    Bilirubin, total 0.3 2023 12:24 PM       Imaging Last 24 Hours:  No results found. Assessment//Plan   Active Problems:    Schizoaffective disorder ([facility] [address] (3/12/2023)      Homicidal ideation (3/12/2023)      Assessment & Plan patient seen follow-up chart reviewed patient.   Dancing and singing mood improved interact with the peers continued inpatient level of care indicated    Electronically signed by Manpreet Gutierrez MD on 3/16/2023 at 11:41 PM

## 2023-03-17 NOTE — GROUP NOTE
HORTENCIA  GROUP DOCUMENTATION INDIVIDUAL                                                                          Group Therapy Note    Date: 3/17/2023    Group Start Time: 3941  Group End Time: 2823  Group Topic: [employer] Meeting    [facility] 2  NON ACUTE    Jami Perez    IP [address] [facility] GROUP DOCUMENTATION GROUP    Group Therapy Note    Attendees:6/10       Attendance: Did not attend    Patient's Goal:      Interventions/techniques: Follows Directions:     Interactions:     Mental Status:     Behavior/appearance:     Goals Achieved: Additional Notes:  Pt was encouraged to attend group but Pt chose not to attend.     [facility] [employer]

## 2023-03-18 PROCEDURE — 65220000003 HC RM SEMIPRIVATE PSYCH

## 2023-03-18 PROCEDURE — 74011250637 HC RX REV CODE- 250/637: Performed by: PSYCHIATRY & NEUROLOGY

## 2023-03-18 RX ADMIN — QUETIAPINE FUMARATE 50 MG: 25 TABLET ORAL at 16:49

## 2023-03-18 RX ADMIN — QUETIAPINE FUMARATE 50 MG: 25 TABLET ORAL at 11:41

## 2023-03-18 RX ADMIN — VENLAFAXINE HYDROCHLORIDE 150 MG: 75 CAPSULE, EXTENDED RELEASE ORAL at 11:41

## 2023-03-18 RX ADMIN — TRAZODONE HYDROCHLORIDE 150 MG: 150 TABLET ORAL at 21:17

## 2023-03-18 RX ADMIN — QUETIAPINE FUMARATE 300 MG: 300 TABLET ORAL at 21:17

## 2023-03-18 NOTE — GROUP NOTE
IP  GROUP DOCUMENTATION INDIVIDUAL                                                                          Group Therapy Note    Date: 3/17/2023    Group Start Time: 8902  Group End Time: 1930  Group Topic: Recreational/Music Therapy    [facility] 2 BH NON ACUTE    Belen Del Real    IP [address] [facility] GROUP DOCUMENTATION GROUP    Group Therapy Note    Attendees: 8/9    Facilitated structured leisure skills group to introduce healthy leisure skills as positive way to cope and manage mood. Attendance: Attended    Patient's Goal:  STG: Attends activities and groups      Interventions/techniques: Art integration and Supported    Follows Directions: Followed directions    Interactions: Interacted appropriately    Mental Status: Calm    Behavior/appearance: Attentive    Goals Achieved: Able to engage in interactions and Able to listen to others      Additional Notes: Attended group and listened to songs with peers. Was receptive to intervention and responded to prompts from staff. Attended entire session and was attentive during group. Verbalized feeling \"frustrated\" during group.      Camelia Dutta, CTRS

## 2023-03-18 NOTE — PROGRESS NOTES
Progress Note  Date:3/18/2023       Room:[facility]  Patient Sim Hui     YOB: 1990     Age:33 y.o. Subjective    Subjective   Review of Systems  Objective         Vitals Last 24 Hours:  TEMPERATURE:  No data recorded  RESPIRATIONS RANGE: Resp  Av  Min: 16  Max: 16  PULSE OXIMETRY RANGE: No data recorded  PULSE RANGE: No data recorded  BLOOD PRESSURE RANGE: No data recorded.  ; No data recorded. I/O (24Hr): No intake or output data in the 24 hours ending 23 0131  Objective  Labs/Imaging/Diagnostics    Labs:  CBC:  Recent Labs     03/15/23  1508   WBC 7.2   RBC 4.00   HGB 9.2*   HCT 30.9*   MCV 77.3*   RDW 17.0*   *     CHEMISTRIES:No results for input(s): NA, K, CL, CO2, BUN, CA, PHOS, MG in the last 72 hours. No lab exists for component: CREATININE, GLUCOSEPT/INR:No results for input(s): INR, INREXT in the last 72 hours. No lab exists for component: PROTIME  APTT:No results for input(s): APTT in the last 72 hours. LIVER PROFILE:No results for input(s): AST, ALT in the last 72 hours. No lab exists for component: Lujean Alamin, ALKPHOS  Lab Results   Component Value Date/Time    ALT (SGPT) 16 2023 12:24 PM    AST (SGOT) 11 (L) 2023 12:24 PM    Alk. phosphatase 74 2023 12:24 PM    Bilirubin, total 0.3 2023 12:24 PM       Imaging Last 24 Hours:  No results found. Assessment//Plan   Active Problems:    Schizoaffective disorder ([facility] [address] (3/12/2023)      Homicidal ideation (3/12/2023)      Assessment & Plan progress note for 2023 patient seen for follow-up discussed with the staff patient is polite with me after no complaint was glad that he can return back to preprogram on Monday however compliant with medication nutrition personal hygiene and grooming refused vital signs sometimes she seems to be having a need to be assertive as if is showing masculine assertiveness.   Continued inpatient level of care indicated thank you    Electronically signed by Kieran Ragsdale MD on 3/18/2023 at 1:31 AM

## 2023-03-18 NOTE — PROGRESS NOTES
Psychiatric Progress Note      Patient: Bambi Hurtado MRN: [id number]  SSN: [id number]    YOB: 1990  Age: 26 y.o. Sex: adult      Admit Date: 3/12/2023       Subjective:     Bambi Hurtado  reports feeling *** . Evaluated in patents room    Denies SI/HI/AH/VH. No aggression or violence. Appropriately interactive and aware. Tolerating medications well. Eating well and sleeping well. Case reviewed with nursing staff and no significant issues or events reported in the last 24 hours. Staff has observed patient interacting on the unit and attending group. Objective:     Vitals:    03/15/23 2027 03/16/23 0742 03/16/23 1956 03/17/23 0803   BP: 100/63 (!) 107/53 124/69    Pulse: 74 71 94    Resp: 18 18 18 16   Temp: 99 °F (37.2 °C) 98.7 °F (37.1 °C) 98.2 °F (36.8 °C)    SpO2:  100%          Mental Status Exam:     Patient is alert, oriented x4  Speech is coherent, moderate volume, no flight of ideas, no looseness of association. Appropriate dressed and groomed  No Active suicidal ideations, plan or intent.   No active homicidal ideations plan or intent  No command hallucinations  Thought Processes linear  No persecutory delusions  Not seen responding to any active internal stimuli  Mood depressed with congruent affect  Insight impaired  Judgement impaired    No signs of tardive dyskinesia or extrapyramidal symptoms    MEDICATIONS:  Current Facility-Administered Medications   Medication Dose Route Frequency    QUEtiapine (SEROquel) tablet 50 mg  50 mg Oral BID WITH MEALS    hydrOXYzine HCL (ATARAX) tablet 50 mg  50 mg Oral TID PRN    traZODone (DESYREL) tablet 150 mg  150 mg Oral QHS PRN    acetaminophen (TYLENOL) tablet 650 mg  650 mg Oral Q4H PRN    magnesium hydroxide (MILK OF MAGNESIA) 400 mg/5 mL oral suspension 30 mL  30 mL Oral DAILY PRN    QUEtiapine (SEROquel) tablet 300 mg  300 mg Oral QHS    venlafaxine-SR (EFFEXOR-XR) capsule 150 mg  150 mg Oral DAILY WITH BREAKFAST    alum-mag hydroxide-simeth (MYLANTA) oral suspension 30 mL  30 mL Oral Q4H PRN        DISCUSSION:  Discussed risk and benefits of medication, provided an opportunity to answer any questions, reviewed discharge goals. Lab/Data Review:  No results found for this or any previous visit (from the past 24 hour(s)). Assessment:     Active Problems:    Schizoaffective disorder ([facility] [address] (3/12/2023)      Homicidal ideation (3/12/2023)        Plan:     Continue current plan of care and working towards discharge goals    Continue to participate in the milieu of activities and work towards discharge goals. Contracts for safety and has no immediate requests    Disposition planning with social work with the goal of a transition to an outpatient level of care. Patient would benefit from ongoing care as they have not yet reached their discharge goals and/or psychotropic medication optimization.     Tentative discharge TBD     Signed By: Caitlin Orourke, PhD, PA-C, Ana Luisa Henry  [phone]    March 18, 2023

## 2023-03-18 NOTE — GROUP NOTE
IP  GROUP DOCUMENTATION INDIVIDUAL                                                                          Group Therapy Note    Date: 3/18/2023    Group Start Time: 0467  Group End Time: 2246  Group Topic: Recreational/Music Therapy    [facility] 2  NON ACUTE    Natasha Serge    IP [address] [facility] GROUP DOCUMENTATION GROUP    Group Therapy Note    Attendees: 10/11    Facilitated structured leisure skills group to introduce healthy leisure skills as positive way to cope and manage mood. Attendance: Attended    Patient's Goal:  STG: Attends activities and groups      Interventions/techniques: Art integration and Supported    Follows Directions: Followed directions    Interactions: Other minimal engagement with peers    Mental Status: Blunted and Preoccupied    Behavior/appearance: Needed prompting    Goals Achieved: Able to listen to others      Additional Notes: Attended group and listened to songs,   Was receptive to intervention and responded to prompts from staff. Had 1 episode in which pt verbalized preoccupation with perception that others were causing conflict. Pt was able to step out into lozano and get self together and then able to rejoin group without incident.      Ana Chambers, CTRS

## 2023-03-18 NOTE — BH NOTES
Pt.is up and visible on unit, affect is flat, appetite good, low energy level, isolates in bed most of shift,seeks out attention when she is not receiving it. refused am vital signs, pt,has not complained of menses discomfort. Denies hallucinations or delusions,remains on close observation.

## 2023-03-18 NOTE — GROUP NOTE
IP  GROUP DOCUMENTATION INDIVIDUAL                                                                          Group Therapy Note    Date: 3/18/2023    Group Start Time: 2068  Group End Time: 1400  Group Topic: Reflection/Relaxation    [facility] 2  NON ACUTE    Princess Earing    IP [address] [facility] GROUP DOCUMENTATION GROUP    Group Therapy Note    Attendees: 6/11    Facilitated structured group to identify positive ways to cope and manage daily stressors. Introduced relaxation techniques using Guided Imagery to practice relaxation in group. Attendance: Attended    Patient's Goal:  STG: Attends activities and groups      Interventions/techniques: Informed, Provide feedback, and Supported    Follows Directions: Followed directions    Interactions: Interacted appropriately    Mental Status: Calm    Behavior/appearance: Attentive    Goals Achieved: Able to engage in interactions and Able to listen to others      Additional Notes: Attended group and actively participated. Was receptive to intervention. Verbalized group was positive way to relax. Verbalized enjoyment. Verbalized meditation as a positive way to relax.      Yumiko Richardson, CTRS

## 2023-03-18 NOTE — GROUP NOTE
HORTENCIA  GROUP DOCUMENTATION INDIVIDUAL                                                                          Group Therapy Note    Date: 3/18/2023    Group Start Time: 0930  Group End Time: 0866  Group Topic: Education Group - Inpatient    [facility] 2  NON ACUTE    Addison Saldana     [facility]    Group Therapy Note    Attendees: 6/11    Facilitated structured group to identify triggers, impact of triggers on mental health, and positive ways to manage triggers.         Attendance: Did not attend    Additional Notes:  Did not attend group despite encouragement    Urszula Snyder, CTRS

## 2023-03-18 NOTE — BH NOTES
NIGHT SHIFT    Pt up in day room socializing with peers and watching tv. Pt refused vital signs for BHT and RN. Pt pleasant and cooperative, but can be labile in mood. Pt states he is on his cycle and needs pads and states \"I need a lot because my cycle is heavy\" this writer provided pt with 6 pads, pt asked for more this writer educated pt that he needed to use the 6 he had until he needed more, pt understood. Pt reports having a 3/10 depression and anxiety but denies needing any PRN medications for anxiety. Pt deneis SI at this time. Pt reports HI towards others but will not elaborate and states \" yall don't need to worry about that. \". Pt denies AVH, but reports \"I saw Rey Evans in my room last night. \" Pt med complaint and requests PRN trazadone to help with sleep. Close observations continued to ensure to safety.

## 2023-03-19 PROCEDURE — 74011250637 HC RX REV CODE- 250/637: Performed by: PSYCHIATRY & NEUROLOGY

## 2023-03-19 PROCEDURE — 65220000003 HC RM SEMIPRIVATE PSYCH

## 2023-03-19 RX ADMIN — QUETIAPINE FUMARATE 300 MG: 300 TABLET ORAL at 21:55

## 2023-03-19 RX ADMIN — TRAZODONE HYDROCHLORIDE 150 MG: 150 TABLET ORAL at 21:55

## 2023-03-19 RX ADMIN — QUETIAPINE FUMARATE 50 MG: 25 TABLET ORAL at 17:01

## 2023-03-19 NOTE — BH NOTES
Nurse Note:    Patient observed sitting quietly in the dayroom watching TV; no behaviors reported and none were observed. No report of SI, HI, A/V hallucinations. No report of anxiety or depression; offered Prn medications; patient received Prn Trazodone for sleep. Patient is medication compliant. Observed eating and drinking during snack time. No report of pain; requested pads and states, \"My cycle is about to stop now\". Observed resting quietly with eyes closed. Will continue to monitor for safety.

## 2023-03-19 NOTE — BH NOTES
Patient was heard yelling out in the dayroom during shift reports; states, Ardia Shaggy are triggering me and I'm not leaving\". Writer validated patients feelings but set boundaries that she would have to leave the dayroom if she continued yell; patient given a choice to stop the behavior or to return to her room. Writer offered to talk to patient after report. Patient was receptive to redirection and stopped yelling and continued watching TV. Writer talked with patient after report was over; patient stated, \"She was triggered but she is feeling better now\". Patient encouraged to talk with staff when having these feelings/emotions; patient agreed. Will continue to monitor for safety.

## 2023-03-19 NOTE — PROGRESS NOTES
Problem: Suicide  Goal: *STG: Remains safe in hospital  Outcome: Progressing Towards Goal     Problem: Suicide  Goal: *STG/LTG: Complies with medication therapy  Outcome: Progressing Towards Goal     Problem: Psychosis  Goal: *STG: Remains safe in hospital  Outcome: Progressing Towards Goal

## 2023-03-19 NOTE — PROGRESS NOTES
Problem: Suicide  Goal: *STG: Remains safe in hospital  Outcome: Progressing Towards Goal     Problem: Psychosis  Goal: *STG: Decreased hallucinations  Outcome: Not Progressing Towards Goal  Goal: *STG: Decreased delusional thinking  Outcome: Not Progressing Towards Goal  Pt has isolated in bed all shift only out for meals and returned back to bed. Pt denied SI/HI. Pt denied hallucinations and delusions. Pt declined morning meds when offered, she di accept 5p Seroquel and requested and rec'd Effexor at 5p. Pt social with peer in the dayroom during dinner. Pt asked writer who offered hr meds this morning, writer informed pt of being assigned nurse today. Pt stated \"If I knew it was you I would have taken them\" referring to 9am meds. Pt educated on the importance of med adherence.

## 2023-03-19 NOTE — GROUP NOTE
IP  GROUP DOCUMENTATION INDIVIDUAL                                                                          Group Therapy Note    Date: 3/19/2023    Group Start Time: 0930  Group End Time: 9538  Group Topic: Education Group - Inpatient    [facility] 2  NON ACUTE    Mojgan Ordonez    IP [facility] GROUP DOCUMENTATION GROUP    Group Therapy Note    Attendees: 8/12    Facilitated group discussion on Common Reactions to Trauma and to identify Stress Risk Factors       Attendance: Did not attend    Additional Notes:  Did not attend group despite encouragement    Celeste Landa, CTRS

## 2023-03-20 VITALS
HEART RATE: 72 BPM | RESPIRATION RATE: 18 BRPM | DIASTOLIC BLOOD PRESSURE: 55 MMHG | OXYGEN SATURATION: 100 % | TEMPERATURE: 97.8 F | SYSTOLIC BLOOD PRESSURE: 104 MMHG

## 2023-03-20 PROCEDURE — 74011250637 HC RX REV CODE- 250/637: Performed by: PSYCHIATRY & NEUROLOGY

## 2023-03-20 RX ORDER — QUETIAPINE FUMARATE 300 MG/1
300 TABLET, FILM COATED ORAL
Qty: 30 TABLET | Refills: 0 | Status: SHIPPED | OUTPATIENT
Start: 2023-03-20

## 2023-03-20 RX ORDER — VENLAFAXINE HYDROCHLORIDE 150 MG/1
150 CAPSULE, EXTENDED RELEASE ORAL
Qty: 30 CAPSULE | Refills: 0 | Status: SHIPPED | OUTPATIENT
Start: 2023-03-21 | End: 2023-03-20 | Stop reason: SDUPTHER

## 2023-03-20 RX ORDER — QUETIAPINE FUMARATE 50 MG/1
50 TABLET, FILM COATED ORAL 2 TIMES DAILY WITH MEALS
Qty: 60 TABLET | Refills: 0 | Status: SHIPPED | OUTPATIENT
Start: 2023-03-20

## 2023-03-20 RX ORDER — VENLAFAXINE HYDROCHLORIDE 150 MG/1
150 CAPSULE, EXTENDED RELEASE ORAL DAILY
Qty: 30 CAPSULE | Refills: 0 | Status: SHIPPED | OUTPATIENT
Start: 2023-03-20

## 2023-03-20 RX ADMIN — QUETIAPINE FUMARATE 50 MG: 25 TABLET ORAL at 09:32

## 2023-03-20 RX ADMIN — VENLAFAXINE HYDROCHLORIDE 150 MG: 75 CAPSULE, EXTENDED RELEASE ORAL at 09:32

## 2023-03-20 NOTE — BH NOTES
DISCHARGE SUMMARY    Kim Vasques  : 1990  MRN: [id number]    The patient Nicho Capps exhibits the ability to control behavior in a less restrictive environment. Patient's level of functioning is improving. No assaultive/destructive behavior has been observed for the past 24 hours. No suicidal/homicidal threat or behavior has been observed for the past 24 hours. There is no evidence of serious medication side effects. Patient has not been in physical or protective restraints for at least the past 24 hours. If weapons involved, how are they secured? N/a    Is patient aware of and in agreement with discharge plan? yes    Arrangements for medication:  Prescriptions will be sent to the [facility] on [address] [place] [address] [facility]. Pt will  his prescriptions and take the as directed by the doctor    Copy of discharge instructions to provider?:  yes    Arrangements for transportation home:  Pt will take a medicaid cab [facility] [address] [place], [place] [address]    Keep all follow up appointments as scheduled, continue to take prescribed medications per physician instructions.   Mental health crisis number:  579 or your local mental health crisis line number at [phone]      [facility] Emergency WARM LINE      [phone] ([phone])      M-F: 9am to 9pm      Sat & Sun: 5pm - 9pm  National suicide prevention lines:                             8-595-YYCAWWE (2-018-680-3365)       [phone]-TALK ([phone])    Crisis Text Line:  Text HOME to 174518

## 2023-03-20 NOTE — BH NOTES
Pt ordered to be discharged today by Dr. Caleb Parry. Pt received and signed for all belongings in safe and storage. Writer reviewed discharge instructions with pt and pt verbalized understanding. Pt received hard copy prescriptions in discharge folder and additional copies placed in pt chart. Pt continued to deny SI/HI/AVH, depression, and anxiety at time of initial assessment and at time of discharge. No physical complaints noted by pt. Pt signed safety plan. Pt escorted off unit by [facility] at [phone].

## 2023-03-20 NOTE — BH NOTES
Behavioral Health Transition Record to Provider    Patient Name: Ezekiel Brooks  YOB: 1990  Medical Record Number: [id number]  Date of Admission: 3/12/2023  Date of Discharge: 3.20.23    Attending Provider: Reji Azul MD  Discharging Provider: Dr. Puneet Bryan  To contact this individual call [phone]. and ask the  to page. If unavailable, ask to be transferred to [facility] Provider on call. [facility] Provider will be available on call 24/7 and during holidays. Primary Care Provider: None    No Known Allergies    Reason for Admission: Pt was admitted himself to the ED with SI/HI after using substances. Pt reported that he was currently drinking and using cocaine. He has a hx two suicidal attempts with the last one being a years ago by an OD.      Admission Diagnosis: Schizoaffective disorder ([facility] [address] [F25.9]  Homicidal ideation [R45.850]    * No surgery found *    Results for orders placed or performed during the hospital encounter of 03/12/23   IRON PROFILE   Result Value Ref Range    Iron 36 35 - 150 ug/dL    TIBC 503 (H) 250 - 450 ug/dL    Iron % saturation 7 (L) 20 - 50 %   T4, FREE   Result Value Ref Range    T4, Free 1.1 0.8 - 1.5 ng/dL   TSH 3RD GENERATION   Result Value Ref Range    TSH 0.02 (L) 0.36 - 3.74 uIU/mL   VITAMIN B12 & FOLATE   Result Value Ref Range    Vitamin B12 372 193 - 986 pg/mL    Folate 10.3 5.0 - 21.0 ng/mL   CBC WITH AUTOMATED DIFF   Result Value Ref Range    WBC 7.2 3.6 - 11.0 K/uL    RBC 4.00 3.80 - 5.20 M/uL    HGB 9.2 (L) 11.5 - 16.0 g/dL    HCT 30.9 (L) 35.0 - 47.0 %    MCV 77.3 (L) 80.0 - 99.0 FL    MCH 23.0 (L) 26.0 - 34.0 PG    MCHC 29.8 (L) 30.0 - 36.5 g/dL    RDW 17.0 (H) 11.5 - 14.5 %    PLATELET 152 (H) 649 - 400 K/uL    MPV 8.6 (L) 8.9 - 12.9 FL    NRBC 0.0 0.0  WBC    ABSOLUTE NRBC 0.00 0.00 - 0.01 K/uL    NEUTROPHILS 66 32 - 75 %    LYMPHOCYTES 23 12 - 49 %    MONOCYTES 8 5 - 13 %    EOSINOPHILS 3 0 - 7 %    BASOPHILS 0 0 - 1 %    IMMATURE GRANULOCYTES 0 0 - 0.5 %    ABS. NEUTROPHILS 4.7 1.8 - 8.0 K/UL    ABS. LYMPHOCYTES 1.7 0.8 - 3.5 K/UL    ABS. MONOCYTES 0.6 0.0 - 1.0 K/UL    ABS. EOSINOPHILS 0.2 0.0 - 0.4 K/UL    ABS. BASOPHILS 0.0 0.0 - 0.1 K/UL    ABS. IMM. GRANS. 0.0 0.00 - 0.04 K/UL    DF AUTOMATED         Immunizations administered during this encounter: There is no immunization history on file for this patient. Screening for Metabolic Disorders for Patients on Antipsychotic Medications  (Data obtained from the EMR)    Estimated Body Mass Index  Estimated body mass index is 35.28 kg/m² as calculated from the following:    Height as of an earlier encounter on 3/12/23: 5' 5\" (1.651 m). Weight as of an earlier encounter on 3/12/23: 96.2 kg (212 lb). Vital Signs/Blood Pressure  Visit Vitals  /79   Pulse 81   Temp 98.2 °F (36.8 °C)   Resp 16   SpO2 100%   Breastfeeding No       Blood Glucose/Hemoglobin A1c  Lab Results   Component Value Date/Time    Glucose 102 (H) 03/12/2023 12:24 PM       Lab Results   Component Value Date/Time    Hemoglobin A1c 5.3 09/11/2020 05:43 AM        Lipid Panel  Lab Results   Component Value Date/Time    Cholesterol, total 191 09/11/2020 05:43 AM    HDL Cholesterol 41 09/11/2020 05:43 AM    LDL, calculated 133.8 (H) 09/11/2020 05:43 AM    Triglyceride 81 09/11/2020 05:43 AM    CHOL/HDL Ratio 4.7 09/11/2020 05:43 AM        Discharge Diagnosis: Schizoaffective disorder ([facility] [address] [F25.9]  Homicidal ideation [R45.850]    Discharge Plan: Pt will take a medicaid cab to [address] [place] [address]. [facility] for [facility] use treatment. Pt will be his medications up at the [facility] on [address] [facility], [address] and take the mediations as directed by the doctor.      Discharge Medication List and Instructions:   Current Discharge Medication List          Unresulted Labs (24h ago, onward)      None          To obtain results of studies pending at discharge, please contact [phone]    Follow-up Information    None         Advanced Directive:   Does the patient have an appointed surrogate decision maker? No  Does the patient have a Medical Advance Directive? No  Does the patient have a Psychiatric Advance Directive? No  If the patient does not have a surrogate or Medical Advance Directive AND Psychiatric Advance Directive, the patient was offered information on these advance directives Patient will complete at a later time    Patient Instructions: Please continue all medications until otherwise directed by physician. Tobacco Cessation Discharge Plan:   Is the patient a smoker and needs referral for smoking cessation? No  Patient referred to the following for smoking cessation with an appointment? Not applicable     Patient was offered medication to assist with smoking cessation at discharge? Not applicable  Was education for smoking cessation added to the discharge instructions? Not applicable    Alcohol/Substance Abuse Discharge Plan:   Does the patient have a history of substance/alcohol abuse and requires a referral for treatment? Yes  Patient referred to the following for substance/alcohol abuse treatment with an appointment? Yes  Patient was offered medication to assist with alcohol cessation at discharge? Not applicable  Was education for substance/alcohol abuse added to discharge instructions?  Yes    Patient discharged to Inpatient facility; discussed with the receiving facility  Primary physician, other healthcare professional, or site designated for follow up care

## 2023-03-20 NOTE — BH NOTES
[facility] Shift Note:    Ms. Yoly Serna was in the dayroom withdrawn and watching TV at the onset of the shift. She presented labile complaining loudly that her toilet. Maintenance came and unstopped toilet and stated that there was washcloths and toiletry items in the commode. She accepted medications as prescribed but refused to talk at length with this nurse. She is currently in bed with her eyes closed with even and unlabored breathing. Staff will continue to monitor on every 15 minute checks.

## 2023-03-20 NOTE — GROUP NOTE
IP  GROUP DOCUMENTATION INDIVIDUAL                                                                          Group Therapy Note    Date: 3/20/2023    Group Start Time: 1330  Group End Time: 1042  Group Topic: Recreational/Music Therapy    [facility] 2  NON ACUTE    Tex Chang    IP [address] [facility] GROUP DOCUMENTATION GROUP    Group Therapy Note    Facilitated leisure skills group to reinforce positive coping and to manage mood through music, social interaction, group activities and art task    Attendees: 6/9       Attendance: Attended    Patient's Goal:  Attend group daily     Interventions/techniques: Art integration and Supported    Follows Directions: Followed directions    Interactions: Interacted appropriately    Mental Status: Calm    Behavior/appearance: Cooperative    Goals Achieved: Able to engage in interactions and Able to listen to others      Additional Notes: Receptive to listening to music and a song selected. Declined to work on leisure task. Interacted with peers and staff.  Pulled from group to be discharged    Cole Biggs, [address]

## 2023-03-20 NOTE — GROUP NOTE
IP  GROUP DOCUMENTATION INDIVIDUAL                                                                          Group Therapy Note    Date: 3/20/2023    Group Start Time: 1115  Group End Time: 1200  Group Topic: Process Group - Inpatient    [facility] ACUTE    Sharyle Rands    IP [facility] [facility] GROUP DOCUMENTATION GROUP    Group Therapy Note: Facilitator encouraged the group to label emotions and discussed ways to manage/regulate emotions using the CBT triangle.      Attendees: 8       Attendance: Did not attend          Chon Heath

## 2023-03-20 NOTE — GROUP NOTE
IP  GROUP DOCUMENTATION INDIVIDUAL                                                                          Group Therapy Note    Date: 3/20/2023    Group Start Time: 0935  Group End Time: 2815  Group Topic: Education Group - Inpatient    [facility] 2  NON ACUTE    Rogers Gordon    IP 1150 [facility] GROUP DOCUMENTATION GROUP    Group Therapy Note    Facilitated group to introduce information on the cognitive triangle and discuss how thoughts about a situation, emotions and behaviors affect one another    Attendees: 7/10       Attendance: Attended    Patient's Goal:  Attend group daily     Interventions/techniques: Informed and Supported    Follows Directions: Followed directions    Interactions: Interacted appropriately    Mental Status: Calm    Behavior/appearance: Cooperative    Goals Achieved: Able to engage in interactions, Able to listen to others, Able to reflect/comment on own behavior, Able to self-disclose, Discussed coping, and Identified distorted thoughts/beliefs      Additional Notes:  Receptive to information discussed and engaged. Recognized examples of different negative thoughts about a situation and was able to challenge thoughts to be more accurate to help change how one feel and behave in a healthy way.  Acknowledged thinking negative prior to admission    Maritza Stovall, [address]

## 2023-03-20 NOTE — PROGRESS NOTES
Problem: Suicide  Goal: *STG: Remains safe in hospital  Outcome: Progressing Towards Goal  Goal: *STG/LTG: Complies with medication therapy  Outcome: Progressing Towards Goal  Goal: *STG/LTG: No longer expresses self destructive or suicidal thoughts  Outcome: Progressing Towards Goal     Problem: Psychosis  Goal: *STG/LTG: Demonstrates improved thought patterns as evidenced by logical and coherent speech  Outcome: Progressing Towards Goal  Goal: *STG/LTG: Demonstrates improved social functioning by responding appropriately to staff  Outcome: Not Progressing Towards Goal

## 2023-03-23 ENCOUNTER — HOSPITAL ENCOUNTER (EMERGENCY)
Age: 33
Discharge: HOME | End: 2023-03-23
Attending: STUDENT IN AN ORGANIZED HEALTH CARE EDUCATION/TRAINING PROGRAM
Payer: MEDICAID

## 2023-03-23 VITALS
TEMPERATURE: 97.5 F | RESPIRATION RATE: 19 BRPM | BODY MASS INDEX: 33.74 KG/M2 | HEIGHT: 67 IN | DIASTOLIC BLOOD PRESSURE: 78 MMHG | HEART RATE: 80 BPM | OXYGEN SATURATION: 100 % | WEIGHT: 215 LBS | SYSTOLIC BLOOD PRESSURE: 124 MMHG

## 2023-03-23 DIAGNOSIS — F48.9 MENTAL HEALTH PROBLEM: Primary | ICD-10-CM

## 2023-03-23 LAB
ALBUMIN SERPL-MCNC: 3.4 G/DL (ref 3.5–5)
ALBUMIN/GLOB SERPL: 0.7 (ref 1.1–2.2)
ALP SERPL-CCNC: 99 U/L (ref 45–117)
ALT SERPL-CCNC: 33 U/L (ref 12–78)
AMPHET UR QL SCN: NEGATIVE
ANION GAP SERPL CALC-SCNC: 8 MMOL/L (ref 5–15)
APPEARANCE UR: ABNORMAL
AST SERPL-CCNC: 16 U/L (ref 15–37)
BACTERIA URNS QL MICRO: NEGATIVE /HPF
BARBITURATES UR QL SCN: NEGATIVE
BASOPHILS # BLD: 0 K/UL (ref 0–0.1)
BASOPHILS NFR BLD: 0 % (ref 0–1)
BENZODIAZ UR QL: NEGATIVE
BILIRUB SERPL-MCNC: 0.2 MG/DL (ref 0.2–1)
BILIRUB UR QL: NEGATIVE
BUN SERPL-MCNC: 11 MG/DL (ref 6–20)
BUN/CREAT SERPL: 19 (ref 12–20)
CALCIUM SERPL-MCNC: 9 MG/DL (ref 8.5–10.1)
CANNABINOIDS UR QL SCN: POSITIVE
CHLORIDE SERPL-SCNC: 105 MMOL/L (ref 97–108)
CO2 SERPL-SCNC: 28 MMOL/L (ref 21–32)
COCAINE UR QL SCN: POSITIVE
COLOR UR: ABNORMAL
CREAT SERPL-MCNC: 0.59 MG/DL (ref 0.55–1.02)
DIFFERENTIAL METHOD BLD: ABNORMAL
DRUG SCRN COMMENT,DRGCM: ABNORMAL
EOSINOPHIL # BLD: 0.1 K/UL (ref 0–0.4)
EOSINOPHIL NFR BLD: 2 % (ref 0–7)
EPITH CASTS URNS QL MICRO: ABNORMAL /LPF
ERYTHROCYTE [DISTWIDTH] IN BLOOD BY AUTOMATED COUNT: 17.9 % (ref 11.5–14.5)
ETHANOL SERPL-MCNC: <10 MG/DL
GLOBULIN SER CALC-MCNC: 4.7 G/DL (ref 2–4)
GLUCOSE SERPL-MCNC: 89 MG/DL (ref 65–100)
GLUCOSE UR STRIP.AUTO-MCNC: NEGATIVE MG/DL
HCT VFR BLD AUTO: 30.5 % (ref 35–47)
HGB BLD-MCNC: 9.1 G/DL (ref 11.5–16)
HGB UR QL STRIP: ABNORMAL
IMM GRANULOCYTES # BLD AUTO: 0 K/UL
IMM GRANULOCYTES NFR BLD AUTO: 0 %
KETONES UR QL STRIP.AUTO: NEGATIVE MG/DL
LEUKOCYTE ESTERASE UR QL STRIP.AUTO: ABNORMAL
LYMPHOCYTES # BLD: 2 K/UL (ref 0.8–3.5)
LYMPHOCYTES NFR BLD: 28 % (ref 12–49)
MCH RBC QN AUTO: 23.5 PG (ref 26–34)
MCHC RBC AUTO-ENTMCNC: 29.8 G/DL (ref 30–36.5)
MCV RBC AUTO: 78.8 FL (ref 80–99)
METHADONE UR QL: NEGATIVE
MONOCYTES # BLD: 0.6 K/UL (ref 0–1)
MONOCYTES NFR BLD: 9 % (ref 5–13)
NEUTS SEG # BLD: 4.5 K/UL (ref 1.8–8)
NEUTS SEG NFR BLD: 61 % (ref 32–75)
NITRITE UR QL STRIP.AUTO: NEGATIVE
NRBC # BLD: 0 K/UL (ref 0–0.01)
NRBC BLD-RTO: 0 PER 100 WBC
OPIATES UR QL: NEGATIVE
PCP UR QL: NEGATIVE
PH UR STRIP: 5.5 (ref 5–8)
PLATELET # BLD AUTO: 398 K/UL (ref 150–400)
PMV BLD AUTO: 8.6 FL (ref 8.9–12.9)
POTASSIUM SERPL-SCNC: 3.9 MMOL/L (ref 3.5–5.1)
PROT SERPL-MCNC: 8.1 G/DL (ref 6.4–8.2)
PROT UR STRIP-MCNC: ABNORMAL MG/DL
RBC # BLD AUTO: 3.87 M/UL (ref 3.8–5.2)
RBC #/AREA URNS HPF: ABNORMAL /HPF (ref 0–5)
RBC MORPH BLD: ABNORMAL
SODIUM SERPL-SCNC: 141 MMOL/L (ref 136–145)
SP GR UR REFRACTOMETRY: 1.02
UA: UC IF INDICATED,UAUC: ABNORMAL
UROBILINOGEN UR QL STRIP.AUTO: 0.2 EU/DL (ref 0.2–1)
WBC # BLD AUTO: 7.2 K/UL (ref 3.6–11)
WBC URNS QL MICRO: ABNORMAL /HPF (ref 0–4)

## 2023-03-23 PROCEDURE — 82077 ASSAY SPEC XCP UR&BREATH IA: CPT

## 2023-03-23 PROCEDURE — 80053 COMPREHEN METABOLIC PANEL: CPT

## 2023-03-23 PROCEDURE — 80307 DRUG TEST PRSMV CHEM ANLYZR: CPT

## 2023-03-23 PROCEDURE — 81001 URINALYSIS AUTO W/SCOPE: CPT

## 2023-03-23 PROCEDURE — 99285 EMERGENCY DEPT VISIT HI MDM: CPT

## 2023-03-23 PROCEDURE — 36415 COLL VENOUS BLD VENIPUNCTURE: CPT

## 2023-03-23 PROCEDURE — 85025 COMPLETE CBC W/AUTO DIFF WBC: CPT

## 2023-03-23 NOTE — BSMART NOTE
Comprehensive Assessment Form Part 1      Section I - Disposition    Axis I - ***   Axis II - ***  Axis III - ***  Axis IV - ***  Axis V - ***      The Medical Doctor to Psychiatrist conference {WAS/NOT:11247::\"was\"} completed. The Medical Doctor is in agreement with Psychiatrist disposition because of (reason) ***. The plan is ***. The on-call Psychiatrist consulted was Dr. Rosario Hart The admitting Psychiatrist will be Dr. Rosario Hart The admitting Diagnosis is ***. The Payor source is ***. The name of the representative was ***. This was {[facility] APPROVED/NOT APPROVED:00877}. Section II - Integrated Summary  Summary:  ***  The patient{has/has not:03277} demonstrated mental capacity to provide informed consent. The information is given by the {Information source:72345}. The Chief Complaint is ***. The Precipitant Factors are ***. Previous Hospitalizations: ***  The patient {HAS BEEN IN RESTRAINTS:48151}  Current Psychiatrist and/or  is ***. Lethality Assessment:    The potential for suicide noted by the following: {Suicide Potential Choices:55783} . The potential for homicide is {NOTED:78148}. The patient {HAS/NOT:51493} been a perpetrator of sexual or physical abuse. There {ARE/ARE NOT PENDING CHARGES:73120}  The patient {IS/IS NOT:98894} felt to be at risk for self harm or harm to others. The attending nurse was advised {[facility] HARM TO SELF OR OTHERS:24845}. Section III - Psychosocial  The patient's overall mood and attitude is ***. Feelings of helplessness and hopelessness are {OBSERVED/NOT OBSERVED:68945}. Generalized anxiety is {OBSERVED/NOT OBSERVED:72720}. Panic is {OBSERVED/NOT OBSERVED:08507}. Phobias are {OBSERVED/NOT OBSERVED:34361}. Obsessive compulsive tendencies are {OBSERVED/NOT OBSERVED:24328}. Section IV - Mental Status Exam  The patient's appearance {APPEARANCE:59681}. The patient's behavior {BEHAVIOR:60740}. The patient is {ORIENTATION:67578}.   The patient's speech {SPEECH:55155}. The patient's mood {MOOD BH:28184}. The range of affect {RANGE OF UINCBO:99629}. The patient's thought content demonstrates {THOUGHT CONTENT:01827}. The thought process {THOUGHT PROCESS:78732}. The patient's perception {PERCEPTION:15608}. The patient's memory {MEMORY BH:92468}. The patient's appetite {APPETITIE:26854}. The patient's sleep {SLEEP BH:68044}. {GLTDLEE:14398}. The patient's judgement {JUDGEMENT:44648}. Section V - Substance Abuse  The patient {IS/NOT:} using substances. The patient is using {SUBSTANCE TYPE:71989}. The patient has experienced the following withdrawal symptoms: {SUBSTANCE ABUSE SYMPTOMS:18681}. Section VI - Living Arrangements  The patient {MARITIAL STATUS:54506}. The patient lives {CAREGIVER:16047}. The patient has {CHILDREN:29794}. The patient {DOES/DOES NOT/WILD CARD (D):18003} plan to return home upon discharge. The patient {DOES/DOES NOT/WILD CARD (D):37451} have legal issues pending. The patient's source of income comes from [facility] SOURCE:}. Rastafarian and cultural practices {CULTURAL/Mu-ism PRACTICES:41697}    The patient's greatest support comes from *** and this person {WILL/WILL NOT:09972} be involved with the treatment. The patient {HAS/HAS NOT:50717585} been in an event described as horrible or outside the realm of ordinary life experience either currently or in the past.  The patient {HAS/NOT:89512} been a victim of sexual/physical abuse. Section VII - Other Areas of Clinical Concern  The highest grade achieved is *** with the overall quality of school experience being described as ***. The patient is currently {EMPLOYED:00010} and speaks {LANGUAGE:45964} as a primary language. The patient has {IMPAIRED COMMUNICATION:35131} affecting communication. The patient's preference for learning can be described as: {Learning assessment:54552}. The patient's hearing is { HEARIN}.   The patient's vision is { VISION:93400}.       Michelle Saha

## 2023-03-23 NOTE — ED NOTES
Pt called jessica to pick her up. Discharge instructions were given to the patient by John Rhodes. The patient left the Emergency Department ambulatory, alert and oriented and in no acute distress with no prescriptions. The patient was encouraged to call or return to the ED for worsening issues or problems and was encouraged to schedule a follow up appointment for continuing care. The patient verbalized understanding of discharge instructions and prescriptions, all questions were answered. The patient has no further concerns at this time.

## 2023-03-23 NOTE — ED NOTES
Pt given clear belongings bag for personal items such as clothes and shoes. Cords placed in locked cabinet.

## 2023-03-23 NOTE — ED NOTES
Per BSMART Dr. Pamelia Boas advised to send patient back to [address] [place] [address]. Patient does not meet inpatient criteria.

## 2023-03-23 NOTE — ED PROVIDER NOTES
EMERGENCY DEPARTMENT HISTORY AND PHYSICAL EXAM      Date: 3/23/2023  Patient Name: Kehinde Strong    History of Presenting Illness     Chief Complaint   Patient presents with    Suicidal     Per pt reports SI with a plan to overdose on medication, +depression, A/V hallucinations and etoh/drug use. HPI: History From: Patient, History limited by: none  Kehinde Strong, 26 y.o. adult presents to the ED with cc of depression and feeling suicidal.  She states that this has been going on \"always\" but got worse over the past few days. She has been having suicidal thoughts of overdosing. She reports prior attempts. She has not actually made any actions recently. She has been taking her medications as prescribed and seeing her therapist at her rehab facility. She reports drinking alcohol, smoking marijuana, and smoking crack yesterday. She denies any fevers, no cough or congestion. There are no other complaints, changes, or physical findings at this time. PCP: None    No current facility-administered medications on file prior to encounter. Current Outpatient Medications on File Prior to Encounter   Medication Sig Dispense Refill    QUEtiapine (SEROquel) 300 mg tablet Take 1 Tablet by mouth nightly. 30 Tablet 0    QUEtiapine (SEROquel) 50 mg tablet Take 1 Tablet by mouth two (2) times daily (with meals). 60 Tablet 0    venlafaxine-SR (EFFEXOR-XR) 150 mg capsule Take 1 Capsule by mouth daily. 30 Capsule 0       Past History     Past Medical History:  Past Medical History:   Diagnosis Date    Schizoaffective disorder ([facility] [address]        Past Surgical History:  No past surgical history on file. Family History:  No family history on file.     Social History:  Social History     Tobacco Use    Smoking status: Every Day     Types: Cigarettes    Smokeless tobacco: Never   Substance Use Topics    Alcohol use: Not Currently    Drug use: Yes     Types: Cocaine       Allergies:  No Known Allergies      Physical Exam   Physical Exam  Constitutional:       General: He is not in acute distress. Appearance: He is not toxic-appearing. Cardiovascular:      Rate and Rhythm: Normal rate and regular rhythm. Pulmonary:      Effort: Pulmonary effort is normal.      Breath sounds: Normal breath sounds. Abdominal:      Palpations: Abdomen is soft. Tenderness: There is no abdominal tenderness. Musculoskeletal:      Cervical back: Neck supple. Skin:     General: Skin is warm. Neurological:      General: No focal deficit present. Mental Status: He is alert. Sensory: No sensory deficit. Motor: No weakness. Psychiatric:      Comments: Depressed mood       Diagnostic Study Results     Labs -     Recent Results (from the past 24 hour(s))   CBC WITH AUTOMATED DIFF    Collection Time: 03/23/23 12:10 PM   Result Value Ref Range    WBC 7.2 3.6 - 11.0 K/uL    RBC 3.87 3.80 - 5.20 M/uL    HGB 9.1 (L) 11.5 - 16.0 g/dL    HCT 30.5 (L) 35.0 - 47.0 %    MCV 78.8 (L) 80.0 - 99.0 FL    MCH 23.5 (L) 26.0 - 34.0 PG    MCHC 29.8 (L) 30.0 - 36.5 g/dL    RDW 17.9 (H) 11.5 - 14.5 %    PLATELET 338 937 - 538 K/uL    MPV 8.6 (L) 8.9 - 12.9 FL    NRBC 0.0 0  WBC    ABSOLUTE NRBC 0.00 0.00 - 0.01 K/uL    NEUTROPHILS PENDING %    LYMPHOCYTES PENDING %    MONOCYTES PENDING %    EOSINOPHILS PENDING %    BASOPHILS PENDING %    IMMATURE GRANULOCYTES PENDING %    ABS. NEUTROPHILS PENDING K/UL    ABS. LYMPHOCYTES PENDING K/UL    ABS. MONOCYTES PENDING K/UL    ABS. EOSINOPHILS PENDING K/UL    ABS. BASOPHILS PENDING K/UL    ABS. IMM. GRANS. PENDING K/UL    DF PENDING        Radiologic Studies -   No orders to display     CT Results  (Last 48 hours)      None          CXR Results  (Last 48 hours)      None              Medical Decision Making   I am the first provider for this patient.     I reviewed the vital signs, available nursing notes, past medical history, past surgical history, family history and social history. Vital Signs-Reviewed the patient's vital signs. Patient Vitals for the past 24 hrs:   Temp Pulse Resp BP SpO2   03/23/23 1146 97.5 °F (36.4 °C) 80 19 124/78 100 %         Provider Notes (Medical Decision Making):    25-year-old presenting with depression and suicidal ideation. They have a known history of mental health disorder, I am concerned for exacerbation of this, potential depression in setting of polysubstance use, stressful life events. They deny headache, neuro exam unremarkable, normal vitals, afebrile and nontoxic-appearing, unlikely acute intracranial emergency or systemic infection. ED Course:     Initial assessment performed. The patients presenting problems have been discussed, and they are in agreement with the care plan formulated and outlined with them. I have encouraged them to ask questions as they arise throughout their visit. Medications - No data to display     ED Course as of 03/23/23 1509   Thu Mar 23, 2023   1338 CBC negative for leukocytosis, basic metabolic panel with normal renal function, no worrisome electrolyte abnormalities. She is medically cleared for psychiatric disposition. [CM]      ED Course User Index  [CM] Jonah-Anne Jay MD        I reviewed their admission from 3/12/2023, noted to be in group therapy at that time. Patient is cleared for discharge from Critical access hospital. Patient is counseled on supportive care and return precautions. Will return to the ED for any new or worrisome symptoms. Will followup with mental health resources within 1 days. Critical Care Time:         Disposition:  Home  Lilibeth Watt  results have been reviewed with him. He has been counseled regarding his diagnosis, treatment, and plan. He verbally conveys understanding and agreement of the signs, symptoms, diagnosis, treatment and prognosis and additionally agrees to follow up as discussed.   He also agrees with the care-plan and conveys that all of his questions have been answered. I have also provided discharge instructions for him that include: educational information regarding their diagnosis and treatment, and list of reasons why they would want to return to the ED prior to their follow-up appointment, should his condition change. PLAN:  1. Current Discharge Medication List        2.    Follow-up Information    None       Return to ED if worse     Diagnosis     Clinical Impression: Acute mental health problem

## 2023-03-23 NOTE — ED NOTES
Pt presents to ED complaining of suicidal ideation. Pt states that she lives in a recovery home and that she has a plan to overdose on her medications and endorses auditory and visual hallucinations. Pt has a hx of an attempted suicide, and endorses alcohol and drug use. Pt is alert and oriented x 4, RR even and unlabored, skin is warm and dry. Assessment completed and pt updated on plan of care. Call bell in reach. Emergency Department Nursing Plan of Care       The Nursing Plan of Care is developed from the Nursing assessment and Emergency Department Attending provider initial evaluation. The plan of care may be reviewed in the ED Provider note.     The Plan of Care was developed with the following considerations:   Patient / Family readiness to learn indicated by:verbalized understanding  Persons(s) to be included in education: patient  Barriers to Learning/Limitations:No    Signed     Etienne Felton RN    3/23/2023

## 2023-04-24 NOTE — BH NOTES
PSYCHIATRIC PROGRESS NOTE         Patient Name  Shiv Cassidy   Date of Birth 1990   [facility] [id number]   Medical Record Number  [id number]      Age  30 y.o. PCP None   Admit date:  12/21/2019    Room Number  321/01  @ [facility]   Date of Service  12/27/2019         E & M PROGRESS NOTE:         HISTORY       CC:  \"suicidal ideation\"  HISTORY OF PRESENT ILLNESS/INTERVAL HISTORY:  (reviewed/updated 12/27/2019). per initial evaluation: Luisito Michel \"Q\" Kecia Chappell is a 30year old female with a history of schizoaffective disorder admitted to [facility] for increased depression and suicidal ideation. She also reports AH and \"people who speak for her. \" She was recently discharged from the psychiatric unit at [facility]. She does not think the the medications given in her most recent hospitalization have been helpful, risperidone and lithium. She would like to take seroquel again, she reports an improvement in sleep with seroquel but reports that \"people speaking for her\" will never go away. She does have multiple social stressors such as losing custody of her children due to neglect.      12/23 - patient received Haldol Benadryl and Ativan PRN for threatening staff, menacing and gross psychosis. She continues to endorse AH and states that her recent change to Seroquel was not helpful for her as the voices did not go away but her depression returned. Patient requests going back on Seroquel. She is discharge focused but spontaneously states she will likely be killed once discharged. 12/24 - patient got PRN Benadryl Haldol and Ativan for agitation and psychosis, she is compliant with Seroquel. She states that her usual dose is 50 mg QDAY and 400 mg QHS. She reports that she is not \"safe\" in the hospital, focused on discharge but also acknowledges that she cannot locate her family in [place] to go there instead.  Patient is tolerating medication; told staff she would · US carotid (7/15/22): 50-69% stenosis of the right internal carotid artery  · Continue asa and statin likely cut herself when discharged. Refused labs this AM.    12/25 - no acute overnight events. Patient blunted, non-spontaneous speech; refused labwork again. Patient medication compliant, no PRNs given. Slept 7 hours. Patient discharge focused, unable to contact her family in [place] and still grossly internally preoccupied. She has not given treatment team the authority to reach out to anyone on her behalf.    12/26 patient labile, irritable, per nursing she has been making homicidal threats and may need to return to more acute side of the unit. Patient refused labs this AM, no significant change in her thought process, and denies SI/HI. Patient compliant with Seroquel, still grossly internally preoccupied, blunted. 12/27 - patient remains irritable, odd, with occasional outbursts toward peers and staff. She is medication compliant, and is generally calm on interview. She is internally preoccupied and appears to be responding to internally stimuli, which she denies. Unclear if this is a baseline behavior as no collateral has been found. SIDE EFFECTS: (reviewed/updated 12/27/2019)  Sedation on lithium     ALLERGIES:(reviewed/updated 12/27/2019)  No Known Allergies   MEDICATIONS PRIOR TO ADMISSION:(reviewed/updated 12/27/2019)  Medications Prior to Admission   Medication Sig    lithium carbonate SR (LITHOBID) 300 mg CR tablet Take 300 mg by mouth two (2) times a day.  risperiDONE (RISPERDAL) 2 mg tablet Take 2 mg by mouth nightly.  traZODone (DESYREL) 100 mg tablet Take 100 mg by mouth nightly. PAST MEDICAL HISTORY: Past medical history from the initial psychiatric evaluation has been reviewed (reviewed/updated 12/27/2019) with no additional updates (I asked patient and no additional past medical history provided). Past Medical History:   Diagnosis Date    Schizoaffective disorder ([facility] [address]    No past surgical history on file.    SOCIAL HISTORY: Social history from the initial psychiatric evaluation has been reviewed (reviewed/updated 12/27/2019) with no additional updates (I asked patient and no additional social history provided). Social History     Socioeconomic History    Marital status: SINGLE     Spouse name: Not on file    Number of children: Not on file    Years of education: Not on file    Highest education level: Not on file   Occupational History    Not on file   Social Needs    Financial resource strain: Not on file    Food insecurity:     Worry: Not on file     Inability: Not on file    Transportation needs:     Medical: Not on file     Non-medical: Not on file   Tobacco Use    Smoking status: Never Smoker    Smokeless tobacco: Never Used   Substance and Sexual Activity    Alcohol use: Not Currently    Drug use: Never    Sexual activity: Not on file   Lifestyle    Physical activity:     Days per week: Not on file     Minutes per session: Not on file    Stress: Not on file   Relationships    Social connections:     Talks on phone: Not on file     Gets together: Not on file     Attends Christian service: Not on file     Active member of club or organization: Not on file     Attends meetings of clubs or organizations: Not on file     Relationship status: Not on file    Intimate partner violence:     Fear of current or ex partner: Not on file     Emotionally abused: Not on file     Physically abused: Not on file     Forced sexual activity: Not on file   Other Topics Concern    Not on file   Social History Narrative    Not on file      FAMILY HISTORY: Family history from the initial psychiatric evaluation has been reviewed (reviewed/updated 12/27/2019) with no additional updates (I asked patient and no additional family history provided). No family history on file.     REVIEW OF SYSTEMS: (reviewed/updated 12/27/2019)  Appetite:no change from normal   Sleep: poor with DIMS (difficulty initiating & maintaining sleep)   All other Review of Systems: Negative except per HPI         MENTAL STATUS EXAM & VITALS     MENTAL STATUS EXAM (MSE):    MSE FINDINGS ARE WITHIN NORMAL LIMITS (WNL) UNLESS OTHERWISE STATED BELOW. ( ALL OF THE BELOW CATEGORIES OF THE MSE HAVE BEEN REVIEWED (reviewed 12/27/2019) AND UPDATED AS DEEMED APPROPRIATE )  General Presentation age appropriate, passive and vague   Orientation not oriented to situation   Vital Signs  See below (reviewed 12/27/2019); Vital Signs (BP, Pulse, & Temp) are within normal limits if not listed below.    Gait and Station Stable/steady, no ataxia   Musculoskeletal System No extrapyramidal symptoms (EPS); no abnormal muscular movements or Tardive Dyskinesia (TD); muscle strength and tone are within normal limits   Language No aphasia or dysarthria   Speech:  normal volume and non-pressured   Thought Processes illogical; normal rate of thoughts; poor abstract reasoning/computation   Thought Associations circumstantial   Thought Content paranoid delusions and internally preoccupied   Suicidal Ideations contracts for safety   Homicidal Ideations none   Mood:  depressed   Affect:  full range and mood-congruent   Memory recent  intact   Memory remote:  intact   Concentration/Attention:  intact   Fund of Knowledge average   Insight:  limited   Reliability fair   Judgment:  poor          VITALS:     Patient Vitals for the past 24 hrs:   Temp Pulse Resp BP SpO2   12/26/19 1936 98.1 °F (36.7 °C) 80 18 116/56 100 %     Wt Readings from Last 3 Encounters:   12/21/19 84.4 kg (186 lb)   12/20/19 85.3 kg (188 lb)   03/04/14 76.7 kg (169 lb)     Temp Readings from Last 3 Encounters:   12/21/19 97.1 °F (36.2 °C)   12/04/19 98.4 °F (36.9 °C)   03/04/14 98.1 °F (36.7 °C)     BP Readings from Last 3 Encounters:   12/26/19 116/56   12/21/19 114/67   12/04/19 117/75     Pulse Readings from Last 3 Encounters:   12/26/19 80   12/21/19 76   12/04/19 68            DATA     LABORATORY DATA:(reviewed/updated 12/27/2019)  No results found for this or any previous visit (from the past 24 hour(s)). No results found for: VALF2, VALAC, VALP, VALPR, DS6, CRBAM, CRBAMP, CARB2, XCRBAM  Lab Results   Component Value Date/Time    Lithium level 0.50 (L) 12/20/2019 02:13 AM      RADIOLOGY REPORTS:(reviewed/updated 12/27/2019)  Xr Chest Port    Result Date: 12/4/2019  AP CHEST, PORTABLE INDICATION: Chest pain COMPARISON: None. FINDINGS:  EKG leads overlie the patient. The cardiac silhouette is top normal in size. The pulmonary vasculature is unremarkable. No focal consolidation, pleural effusion, or pneumothorax. No acute osseous abnormalities are identified. Impression:  No acute finding.           MEDICATIONS     ALL MEDICATIONS:   Current Facility-Administered Medications   Medication Dose Route Frequency    QUEtiapine (SEROquel) tablet 300 mg  300 mg Oral Q12H    traZODone (DESYREL) tablet 150 mg  150 mg Oral QHS    FLUoxetine (PROzac) capsule 20 mg  20 mg Oral DAILY    OLANZapine (ZyPREXA) tablet 5 mg  5 mg Oral Q6H PRN    haloperidol lactate (HALDOL) injection 5 mg  5 mg IntraMUSCular Q6H PRN    benztropine (COGENTIN) tablet 1 mg  1 mg Oral BID PRN    diphenhydrAMINE (BENADRYL) injection 50 mg  50 mg IntraMUSCular BID PRN    hydrOXYzine HCl (ATARAX) tablet 50 mg  50 mg Oral TID PRN    LORazepam (ATIVAN) injection 1 mg  1 mg IntraMUSCular Q4H PRN    traZODone (DESYREL) tablet 50 mg  50 mg Oral QHS PRN    acetaminophen (TYLENOL) tablet 650 mg  650 mg Oral Q4H PRN    magnesium hydroxide (MILK OF MAGNESIA) 400 mg/5 mL oral suspension 30 mL  30 mL Oral DAILY PRN      SCHEDULED MEDICATIONS:   Current Facility-Administered Medications   Medication Dose Route Frequency    QUEtiapine (SEROquel) tablet 300 mg  300 mg Oral Q12H    traZODone (DESYREL) tablet 150 mg  150 mg Oral QHS    FLUoxetine (PROzac) capsule 20 mg  20 mg Oral DAILY          ASSESSMENT & PLAN     DIAGNOSES REQUIRING ACTIVE TREATMENT AND MONITORING: (reviewed/updated 12/27/2019)  Patient Active Hospital Problem List: Schizoaffective disorder, depressive type ([facility] (12/23/2019)    Assessment: patient with recent decompensation in her thought process following a recent medication change. Per patient, she has not had any attenuation of symptoms since starting new agents. Plan:  - CONTINUE Prozac 20 mg QDAY for depression  - CONTINUE Seroquel 300 mg Q12H for psychosis  - CONTINUE Trazodone 150 mg QHS for insomnia    In summary, Sukumar Villela is a 30 y.o.  female who presents with a severe exacerbation of the principal diagnosis of Schizoaffective disorder, depressive type ([facility] [address]    Patient's condition is not improving. Patient requires continued inpatient hospitalization for further stabilization, safety monitoring and medication management. I will continue to coordinate the provision of individual, milieu, occupational, group, and substance abuse therapies to address target symptoms/diagnoses as deemed appropriate for the individual patient. A coordinated, multidisplinary treatment team round was conducted with the patient (this team consists of the nurse, psychiatric unit pharmcist,  and writer). Complete current electronic health record for patient has been reviewed today including consultant notes, ancillary staff notes, nurses and psychiatric tech notes. Suicide risk assessment completed and patient deemed to be of low risk for suicide at this time. The following regarding medications was addressed during rounds with patient:   the risks and benefits of the proposed medication. The patient was given the opportunity to ask questions. Informed consent given to the use of the above medications. Will continue to adjust psychiatric and non-psychiatric medications (see above \"medication\" section and orders section for details) as deemed appropriate & based upon diagnoses and response to treatment.      I will continue to order blood tests/labs and diagnostic tests as deemed appropriate and review results as they become available (see orders for details and above listed lab/test results). I will order psychiatric records from previous [facility] hospitals to further elucidate the nature of patient's psychopathology and review once available. I will gather additional collateral information from friends, family and o/p treatment team to further elucidate the nature of patient's psychopathology and baselline level of psychiatric functioning. I certify that this patient's inpatient psychiatric hospital services furnished since the previous certification were, and continue to be, required for treatment that could reasonably be expected to improve the patient's condition, or for diagnostic study, and that the patient continues to need, on a daily basis, active treatment furnished directly by or requiring the supervision of inpatient psychiatric facility personnel. In addition, the hospital records show that services furnished were intensive treatment services, admission or related services, or equivalent services.     EXPECTED DISCHARGE DATE/DAY: TBD     DISPOSITION: Home       Signed By:   Dakota Sheridan MD  12/27/2019

## 2023-06-15 ENCOUNTER — HOSPITAL ENCOUNTER (INPATIENT)
Facility: HOSPITAL | Age: 33
LOS: 12 days | Discharge: HOME | DRG: 750 | End: 2023-06-27
Attending: EMERGENCY MEDICINE | Admitting: PSYCHIATRY & NEUROLOGY
Payer: MEDICAID

## 2023-06-15 DIAGNOSIS — R45.851 SUICIDAL IDEATION: Primary | ICD-10-CM

## 2023-06-15 LAB
ALBUMIN SERPL-MCNC: 3.7 G/DL (ref 3.5–5)
ALBUMIN/GLOB SERPL: 0.8 (ref 1.1–2.2)
ALP SERPL-CCNC: 96 U/L (ref 45–117)
ALT SERPL-CCNC: 23 U/L (ref 12–78)
AMPHET UR QL SCN: NEGATIVE
ANION GAP SERPL CALC-SCNC: 11 MMOL/L (ref 5–15)
APPEARANCE UR: ABNORMAL
AST SERPL-CCNC: 13 U/L (ref 15–37)
BACTERIA URNS QL MICRO: NEGATIVE /HPF
BARBITURATES UR QL SCN: NEGATIVE
BASOPHILS # BLD: 0.1 K/UL (ref 0–0.1)
BASOPHILS NFR BLD: 1 % (ref 0–1)
BENZODIAZ UR QL: NEGATIVE
BILIRUB SERPL-MCNC: 0.3 MG/DL (ref 0.2–1)
BILIRUB UR QL: NEGATIVE
BUN SERPL-MCNC: 8 MG/DL (ref 6–20)
BUN/CREAT SERPL: 12 (ref 12–20)
CALCIUM SERPL-MCNC: 8.9 MG/DL (ref 8.5–10.1)
CANNABINOIDS UR QL SCN: POSITIVE
CHLORIDE SERPL-SCNC: 105 MMOL/L (ref 97–108)
CO2 SERPL-SCNC: 22 MMOL/L (ref 21–32)
COCAINE UR QL SCN: POSITIVE
COLOR UR: ABNORMAL
CREAT SERPL-MCNC: 0.65 MG/DL (ref 0.55–1.02)
DIFFERENTIAL METHOD BLD: ABNORMAL
EOSINOPHIL # BLD: 0.2 K/UL (ref 0–0.4)
EOSINOPHIL NFR BLD: 2 % (ref 0–7)
EPITH CASTS URNS QL MICRO: ABNORMAL /LPF
ERYTHROCYTE [DISTWIDTH] IN BLOOD BY AUTOMATED COUNT: 16.6 % (ref 11.5–14.5)
ETHANOL SERPL-MCNC: 86 MG/DL (ref 0–0.08)
FLUAV RNA SPEC QL NAA+PROBE: NOT DETECTED
FLUBV RNA SPEC QL NAA+PROBE: NOT DETECTED
GLOBULIN SER CALC-MCNC: 4.6 G/DL (ref 2–4)
GLUCOSE SERPL-MCNC: 93 MG/DL (ref 65–100)
GLUCOSE UR STRIP.AUTO-MCNC: NEGATIVE MG/DL
HCG UR QL: NEGATIVE
HCT VFR BLD AUTO: 30.2 % (ref 35–47)
HGB BLD-MCNC: 9.1 G/DL (ref 11.5–16)
HGB UR QL STRIP: NEGATIVE
IMM GRANULOCYTES # BLD AUTO: 0 K/UL (ref 0–0.04)
IMM GRANULOCYTES NFR BLD AUTO: 0 % (ref 0–0.5)
KETONES UR QL STRIP.AUTO: NEGATIVE MG/DL
LEUKOCYTE ESTERASE UR QL STRIP.AUTO: NEGATIVE
LYMPHOCYTES # BLD: 2.9 K/UL (ref 0.8–3.5)
LYMPHOCYTES NFR BLD: 30 % (ref 12–49)
Lab: ABNORMAL
MCH RBC QN AUTO: 21.8 PG (ref 26–34)
MCHC RBC AUTO-ENTMCNC: 30.1 G/DL (ref 30–36.5)
MCV RBC AUTO: 72.2 FL (ref 80–99)
METHADONE UR QL: NEGATIVE
MONOCYTES # BLD: 0.7 K/UL (ref 0–1)
MONOCYTES NFR BLD: 7 % (ref 5–13)
NEUTS SEG # BLD: 6 K/UL (ref 1.8–8)
NEUTS SEG NFR BLD: 60 % (ref 32–75)
NITRITE UR QL STRIP.AUTO: NEGATIVE
NRBC # BLD: 0 K/UL (ref 0–0.01)
NRBC BLD-RTO: 0 PER 100 WBC
OPIATES UR QL: NEGATIVE
PCP UR QL: NEGATIVE
PH UR STRIP: 5.5 (ref 5–8)
PLATELET # BLD AUTO: 529 K/UL (ref 150–400)
PMV BLD AUTO: 8.5 FL (ref 8.9–12.9)
POTASSIUM SERPL-SCNC: 3.7 MMOL/L (ref 3.5–5.1)
PROT SERPL-MCNC: 8.3 G/DL (ref 6.4–8.2)
PROT UR STRIP-MCNC: ABNORMAL MG/DL
RBC # BLD AUTO: 4.18 M/UL (ref 3.8–5.2)
RBC #/AREA URNS HPF: ABNORMAL /HPF (ref 0–5)
SARS-COV-2 RNA RESP QL NAA+PROBE: NOT DETECTED
SODIUM SERPL-SCNC: 138 MMOL/L (ref 136–145)
SP GR UR REFRACTOMETRY: 1.02
URINE CULTURE IF INDICATED: ABNORMAL
UROBILINOGEN UR QL STRIP.AUTO: 1 EU/DL (ref 0.2–1)
WBC # BLD AUTO: 9.8 K/UL (ref 3.6–11)
WBC URNS QL MICRO: ABNORMAL /HPF (ref 0–4)

## 2023-06-15 PROCEDURE — 81001 URINALYSIS AUTO W/SCOPE: CPT

## 2023-06-15 PROCEDURE — 87636 SARSCOV2 & INF A&B AMP PRB: CPT

## 2023-06-15 PROCEDURE — 93005 ELECTROCARDIOGRAM TRACING: CPT | Performed by: EMERGENCY MEDICINE

## 2023-06-15 PROCEDURE — 1240000000 HC EMOTIONAL WELLNESS R&B

## 2023-06-15 PROCEDURE — 85025 COMPLETE CBC W/AUTO DIFF WBC: CPT

## 2023-06-15 PROCEDURE — 80307 DRUG TEST PRSMV CHEM ANLYZR: CPT

## 2023-06-15 PROCEDURE — 6370000000 HC RX 637 (ALT 250 FOR IP): Performed by: PSYCHIATRY & NEUROLOGY

## 2023-06-15 PROCEDURE — 36415 COLL VENOUS BLD VENIPUNCTURE: CPT

## 2023-06-15 PROCEDURE — 80053 COMPREHEN METABOLIC PANEL: CPT

## 2023-06-15 PROCEDURE — 82077 ASSAY SPEC XCP UR&BREATH IA: CPT

## 2023-06-15 PROCEDURE — 81025 URINE PREGNANCY TEST: CPT

## 2023-06-15 PROCEDURE — 99285 EMERGENCY DEPT VISIT HI MDM: CPT

## 2023-06-15 PROCEDURE — 90791 PSYCH DIAGNOSTIC EVALUATION: CPT

## 2023-06-15 RX ORDER — MAGNESIUM HYDROXIDE/ALUMINUM HYDROXICE/SIMETHICONE 120; 1200; 1200 MG/30ML; MG/30ML; MG/30ML
30 SUSPENSION ORAL EVERY 6 HOURS PRN
Status: DISCONTINUED | OUTPATIENT
Start: 2023-06-15 | End: 2023-06-27 | Stop reason: HOSPADM

## 2023-06-15 RX ORDER — TRAZODONE HYDROCHLORIDE 50 MG/1
50 TABLET ORAL NIGHTLY PRN
Status: DISCONTINUED | OUTPATIENT
Start: 2023-06-15 | End: 2023-06-27 | Stop reason: HOSPADM

## 2023-06-15 RX ORDER — LORAZEPAM 2 MG/ML
2 INJECTION INTRAMUSCULAR EVERY 6 HOURS PRN
Status: DISCONTINUED | OUTPATIENT
Start: 2023-06-15 | End: 2023-06-26

## 2023-06-15 RX ORDER — ACETAMINOPHEN 325 MG/1
650 TABLET ORAL EVERY 4 HOURS PRN
Status: DISCONTINUED | OUTPATIENT
Start: 2023-06-15 | End: 2023-06-27 | Stop reason: HOSPADM

## 2023-06-15 RX ORDER — DIPHENHYDRAMINE HYDROCHLORIDE 50 MG/ML
50 INJECTION INTRAMUSCULAR; INTRAVENOUS EVERY 4 HOURS PRN
Status: DISCONTINUED | OUTPATIENT
Start: 2023-06-15 | End: 2023-06-27 | Stop reason: HOSPADM

## 2023-06-15 RX ORDER — HYDROXYZINE HYDROCHLORIDE 25 MG/1
50 TABLET, FILM COATED ORAL 3 TIMES DAILY PRN
Status: DISCONTINUED | OUTPATIENT
Start: 2023-06-15 | End: 2023-06-27 | Stop reason: HOSPADM

## 2023-06-15 RX ORDER — POLYETHYLENE GLYCOL 3350 17 G
2 POWDER IN PACKET (EA) ORAL
Status: DISCONTINUED | OUTPATIENT
Start: 2023-06-15 | End: 2023-06-27 | Stop reason: HOSPADM

## 2023-06-15 RX ORDER — HALOPERIDOL 5 MG/1
5 TABLET ORAL EVERY 4 HOURS PRN
Status: DISCONTINUED | OUTPATIENT
Start: 2023-06-15 | End: 2023-06-27 | Stop reason: HOSPADM

## 2023-06-15 RX ORDER — HALOPERIDOL 5 MG/ML
5 INJECTION INTRAMUSCULAR EVERY 4 HOURS PRN
Status: DISCONTINUED | OUTPATIENT
Start: 2023-06-15 | End: 2023-06-27 | Stop reason: HOSPADM

## 2023-06-15 RX ORDER — VENLAFAXINE HYDROCHLORIDE 75 MG/1
150 CAPSULE, EXTENDED RELEASE ORAL
Status: DISCONTINUED | OUTPATIENT
Start: 2023-06-16 | End: 2023-06-27 | Stop reason: HOSPADM

## 2023-06-15 RX ORDER — POLYETHYLENE GLYCOL 3350 17 G/17G
17 POWDER, FOR SOLUTION ORAL DAILY PRN
Status: DISCONTINUED | OUTPATIENT
Start: 2023-06-15 | End: 2023-06-27 | Stop reason: HOSPADM

## 2023-06-15 RX ADMIN — NICOTINE POLACRILEX 2 MG: 2 LOZENGE ORAL at 17:37

## 2023-06-15 RX ADMIN — HYDROXYZINE HYDROCHLORIDE 50 MG: 25 TABLET, FILM COATED ORAL at 17:37

## 2023-06-15 RX ADMIN — TRAZODONE HYDROCHLORIDE 150 MG: 100 TABLET ORAL at 21:08

## 2023-06-15 RX ADMIN — QUETIAPINE FUMARATE 300 MG: 200 TABLET ORAL at 21:08

## 2023-06-15 RX ADMIN — HALOPERIDOL 5 MG: 5 TABLET ORAL at 17:37

## 2023-06-15 ASSESSMENT — PATIENT HEALTH QUESTIONNAIRE - PHQ9: SUM OF ALL RESPONSES TO PHQ QUESTIONS 1-9: 8

## 2023-06-15 ASSESSMENT — SLEEP AND FATIGUE QUESTIONNAIRES
DO YOU USE A SLEEP AID: NO
AVERAGE NUMBER OF SLEEP HOURS: 8
SLEEP PATTERN: NORMAL
DO YOU HAVE DIFFICULTY SLEEPING: NO

## 2023-06-15 ASSESSMENT — PAIN - FUNCTIONAL ASSESSMENT: PAIN_FUNCTIONAL_ASSESSMENT: NONE - DENIES PAIN

## 2023-06-15 ASSESSMENT — LIFESTYLE VARIABLES
HOW MANY STANDARD DRINKS CONTAINING ALCOHOL DO YOU HAVE ON A TYPICAL DAY: 1 OR 2
HOW OFTEN DO YOU HAVE A DRINK CONTAINING ALCOHOL: MONTHLY OR LESS

## 2023-06-16 LAB
EKG ATRIAL RATE: 68 BPM
EKG DIAGNOSIS: NORMAL
EKG P AXIS: 34 DEGREES
EKG P-R INTERVAL: 174 MS
EKG Q-T INTERVAL: 430 MS
EKG QRS DURATION: 88 MS
EKG QTC CALCULATION (BAZETT): 457 MS
EKG R AXIS: 57 DEGREES
EKG T AXIS: 48 DEGREES
EKG VENTRICULAR RATE: 68 BPM

## 2023-06-16 PROCEDURE — 1240000000 HC EMOTIONAL WELLNESS R&B

## 2023-06-16 PROCEDURE — 93010 ELECTROCARDIOGRAM REPORT: CPT | Performed by: SPECIALIST

## 2023-06-16 PROCEDURE — 6370000000 HC RX 637 (ALT 250 FOR IP): Performed by: PSYCHIATRY & NEUROLOGY

## 2023-06-16 RX ORDER — VENLAFAXINE HYDROCHLORIDE 75 MG/1
75 CAPSULE, EXTENDED RELEASE ORAL DAILY
Status: ON HOLD | COMMUNITY
End: 2023-06-27 | Stop reason: HOSPADM

## 2023-06-16 RX ORDER — TRAZODONE HYDROCHLORIDE 150 MG/1
150 TABLET ORAL NIGHTLY
Status: ON HOLD | COMMUNITY
End: 2023-06-27 | Stop reason: HOSPADM

## 2023-06-16 RX ADMIN — HYDROXYZINE HYDROCHLORIDE 50 MG: 25 TABLET, FILM COATED ORAL at 20:38

## 2023-06-16 RX ADMIN — QUETIAPINE FUMARATE 300 MG: 200 TABLET ORAL at 20:38

## 2023-06-16 RX ADMIN — TRAZODONE HYDROCHLORIDE 150 MG: 100 TABLET ORAL at 20:38

## 2023-06-16 RX ADMIN — HYDROXYZINE HYDROCHLORIDE 50 MG: 25 TABLET, FILM COATED ORAL at 08:42

## 2023-06-16 RX ADMIN — VENLAFAXINE HYDROCHLORIDE 150 MG: 75 CAPSULE, EXTENDED RELEASE ORAL at 08:42

## 2023-06-17 PROCEDURE — 6370000000 HC RX 637 (ALT 250 FOR IP): Performed by: PSYCHIATRY & NEUROLOGY

## 2023-06-17 PROCEDURE — 1240000000 HC EMOTIONAL WELLNESS R&B

## 2023-06-17 RX ORDER — TRAZODONE HYDROCHLORIDE 100 MG/1
200 TABLET ORAL NIGHTLY
Status: DISCONTINUED | OUTPATIENT
Start: 2023-06-17 | End: 2023-06-27 | Stop reason: HOSPADM

## 2023-06-17 RX ADMIN — HYDROXYZINE HYDROCHLORIDE 50 MG: 25 TABLET, FILM COATED ORAL at 21:08

## 2023-06-17 RX ADMIN — VENLAFAXINE HYDROCHLORIDE 150 MG: 75 CAPSULE, EXTENDED RELEASE ORAL at 08:33

## 2023-06-17 RX ADMIN — TRAZODONE HYDROCHLORIDE 200 MG: 100 TABLET ORAL at 21:08

## 2023-06-17 RX ADMIN — QUETIAPINE FUMARATE 300 MG: 200 TABLET ORAL at 21:09

## 2023-06-18 PROCEDURE — 6370000000 HC RX 637 (ALT 250 FOR IP): Performed by: PSYCHIATRY & NEUROLOGY

## 2023-06-18 PROCEDURE — 1240000000 HC EMOTIONAL WELLNESS R&B

## 2023-06-18 RX ORDER — QUETIAPINE FUMARATE 200 MG/1
400 TABLET, FILM COATED ORAL NIGHTLY
Status: DISCONTINUED | OUTPATIENT
Start: 2023-06-18 | End: 2023-06-27 | Stop reason: HOSPADM

## 2023-06-18 RX ADMIN — VENLAFAXINE HYDROCHLORIDE 150 MG: 75 CAPSULE, EXTENDED RELEASE ORAL at 08:38

## 2023-06-18 RX ADMIN — HYDROXYZINE HYDROCHLORIDE 50 MG: 25 TABLET, FILM COATED ORAL at 21:19

## 2023-06-18 RX ADMIN — ACETAMINOPHEN 650 MG: 325 TABLET ORAL at 21:19

## 2023-06-18 RX ADMIN — QUETIAPINE FUMARATE 400 MG: 200 TABLET ORAL at 21:19

## 2023-06-18 RX ADMIN — ACETAMINOPHEN 650 MG: 325 TABLET ORAL at 04:56

## 2023-06-18 RX ADMIN — TRAZODONE HYDROCHLORIDE 200 MG: 100 TABLET ORAL at 21:19

## 2023-06-18 ASSESSMENT — PAIN DESCRIPTION - LOCATION
LOCATION: TEETH
LOCATION: ABDOMEN;PELVIS

## 2023-06-18 ASSESSMENT — PAIN - FUNCTIONAL ASSESSMENT: PAIN_FUNCTIONAL_ASSESSMENT: 0-10

## 2023-06-18 ASSESSMENT — PAIN DESCRIPTION - DESCRIPTORS: DESCRIPTORS: ACHING

## 2023-06-18 ASSESSMENT — PAIN SCALES - GENERAL
PAINLEVEL_OUTOF10: 3
PAINLEVEL_OUTOF10: 0
PAINLEVEL_OUTOF10: 5
PAINLEVEL_OUTOF10: 0

## 2023-06-18 ASSESSMENT — PAIN SCALES - WONG BAKER
WONGBAKER_NUMERICALRESPONSE: 0
WONGBAKER_NUMERICALRESPONSE: 0

## 2023-06-18 ASSESSMENT — PAIN DESCRIPTION - ORIENTATION: ORIENTATION: RIGHT

## 2023-06-19 PROCEDURE — 6370000000 HC RX 637 (ALT 250 FOR IP): Performed by: PSYCHIATRY & NEUROLOGY

## 2023-06-19 PROCEDURE — 1240000000 HC EMOTIONAL WELLNESS R&B

## 2023-06-19 RX ORDER — IBUPROFEN 600 MG/1
600 TABLET ORAL EVERY 6 HOURS PRN
Status: DISCONTINUED | OUTPATIENT
Start: 2023-06-19 | End: 2023-06-27 | Stop reason: HOSPADM

## 2023-06-19 RX ADMIN — QUETIAPINE FUMARATE 400 MG: 200 TABLET ORAL at 21:08

## 2023-06-19 RX ADMIN — IBUPROFEN 600 MG: 600 TABLET, FILM COATED ORAL at 15:06

## 2023-06-19 RX ADMIN — ACETAMINOPHEN 650 MG: 325 TABLET ORAL at 06:46

## 2023-06-19 RX ADMIN — VENLAFAXINE HYDROCHLORIDE 150 MG: 75 CAPSULE, EXTENDED RELEASE ORAL at 08:35

## 2023-06-19 RX ADMIN — IBUPROFEN 600 MG: 600 TABLET, FILM COATED ORAL at 21:08

## 2023-06-19 RX ADMIN — TRAZODONE HYDROCHLORIDE 200 MG: 100 TABLET ORAL at 21:08

## 2023-06-19 ASSESSMENT — PAIN SCALES - WONG BAKER
WONGBAKER_NUMERICALRESPONSE: 0
WONGBAKER_NUMERICALRESPONSE: 0

## 2023-06-19 ASSESSMENT — PAIN DESCRIPTION - ORIENTATION
ORIENTATION: LOWER

## 2023-06-19 ASSESSMENT — PAIN DESCRIPTION - LOCATION
LOCATION: ABDOMEN;VAGINA
LOCATION: ABDOMEN

## 2023-06-19 ASSESSMENT — PAIN SCALES - GENERAL
PAINLEVEL_OUTOF10: 6
PAINLEVEL_OUTOF10: 0
PAINLEVEL_OUTOF10: 6
PAINLEVEL_OUTOF10: 0
PAINLEVEL_OUTOF10: 6
PAINLEVEL_OUTOF10: 6

## 2023-06-19 ASSESSMENT — PAIN DESCRIPTION - DESCRIPTORS
DESCRIPTORS: CRAMPING
DESCRIPTORS: CRAMPING
DESCRIPTORS: ACHING

## 2023-06-19 ASSESSMENT — PAIN - FUNCTIONAL ASSESSMENT: PAIN_FUNCTIONAL_ASSESSMENT: ACTIVITIES ARE NOT PREVENTED

## 2023-06-20 PROCEDURE — 6370000000 HC RX 637 (ALT 250 FOR IP): Performed by: PSYCHIATRY & NEUROLOGY

## 2023-06-20 PROCEDURE — 1240000000 HC EMOTIONAL WELLNESS R&B

## 2023-06-20 RX ADMIN — QUETIAPINE FUMARATE 400 MG: 200 TABLET ORAL at 22:14

## 2023-06-20 RX ADMIN — TRAZODONE HYDROCHLORIDE 200 MG: 100 TABLET ORAL at 22:14

## 2023-06-20 RX ADMIN — IBUPROFEN 600 MG: 600 TABLET, FILM COATED ORAL at 18:57

## 2023-06-20 ASSESSMENT — PAIN SCALES - GENERAL
PAINLEVEL_OUTOF10: 8
PAINLEVEL_OUTOF10: 0
PAINLEVEL_OUTOF10: 0

## 2023-06-20 ASSESSMENT — PAIN DESCRIPTION - ORIENTATION: ORIENTATION: OTHER (COMMENT)

## 2023-06-20 ASSESSMENT — PAIN DESCRIPTION - LOCATION: LOCATION: GENERALIZED

## 2023-06-20 ASSESSMENT — PAIN DESCRIPTION - DESCRIPTORS: DESCRIPTORS: ACHING

## 2023-06-21 PROCEDURE — 6370000000 HC RX 637 (ALT 250 FOR IP): Performed by: PSYCHIATRY & NEUROLOGY

## 2023-06-21 PROCEDURE — 1240000000 HC EMOTIONAL WELLNESS R&B

## 2023-06-21 RX ADMIN — ALUMINUM HYDROXIDE, MAGNESIUM HYDROXIDE, AND SIMETHICONE 30 ML: 200; 200; 20 SUSPENSION ORAL at 21:15

## 2023-06-21 RX ADMIN — IBUPROFEN 600 MG: 600 TABLET, FILM COATED ORAL at 21:22

## 2023-06-21 RX ADMIN — VENLAFAXINE HYDROCHLORIDE 150 MG: 75 CAPSULE, EXTENDED RELEASE ORAL at 08:23

## 2023-06-21 RX ADMIN — QUETIAPINE FUMARATE 400 MG: 200 TABLET ORAL at 21:14

## 2023-06-21 RX ADMIN — TRAZODONE HYDROCHLORIDE 200 MG: 100 TABLET ORAL at 21:14

## 2023-06-21 RX ADMIN — HALOPERIDOL 5 MG: 5 TABLET ORAL at 11:47

## 2023-06-21 ASSESSMENT — PAIN SCALES - GENERAL
PAINLEVEL_OUTOF10: 0
PAINLEVEL_OUTOF10: 0
PAINLEVEL_OUTOF10: 5

## 2023-06-21 ASSESSMENT — PAIN DESCRIPTION - LOCATION: LOCATION: ABDOMEN

## 2023-06-22 PROCEDURE — 1240000000 HC EMOTIONAL WELLNESS R&B

## 2023-06-22 PROCEDURE — 6370000000 HC RX 637 (ALT 250 FOR IP): Performed by: PSYCHIATRY & NEUROLOGY

## 2023-06-22 RX ADMIN — IBUPROFEN 600 MG: 600 TABLET, FILM COATED ORAL at 21:52

## 2023-06-22 RX ADMIN — QUETIAPINE FUMARATE 400 MG: 200 TABLET ORAL at 21:48

## 2023-06-22 RX ADMIN — VENLAFAXINE HYDROCHLORIDE 150 MG: 75 CAPSULE, EXTENDED RELEASE ORAL at 11:47

## 2023-06-22 RX ADMIN — TRAZODONE HYDROCHLORIDE 200 MG: 100 TABLET ORAL at 21:48

## 2023-06-22 ASSESSMENT — PAIN SCALES - GENERAL
PAINLEVEL_OUTOF10: 0
PAINLEVEL_OUTOF10: 0
PAINLEVEL_OUTOF10: 5

## 2023-06-22 ASSESSMENT — PAIN DESCRIPTION - LOCATION: LOCATION: ABDOMEN

## 2023-06-23 PROCEDURE — 6370000000 HC RX 637 (ALT 250 FOR IP): Performed by: PSYCHIATRY & NEUROLOGY

## 2023-06-23 PROCEDURE — 1240000000 HC EMOTIONAL WELLNESS R&B

## 2023-06-23 RX ADMIN — VENLAFAXINE HYDROCHLORIDE 150 MG: 75 CAPSULE, EXTENDED RELEASE ORAL at 08:34

## 2023-06-23 RX ADMIN — IBUPROFEN 600 MG: 600 TABLET, FILM COATED ORAL at 13:33

## 2023-06-23 RX ADMIN — HYDROXYZINE HYDROCHLORIDE 50 MG: 25 TABLET, FILM COATED ORAL at 19:36

## 2023-06-23 RX ADMIN — QUETIAPINE FUMARATE 400 MG: 200 TABLET ORAL at 19:36

## 2023-06-23 RX ADMIN — TRAZODONE HYDROCHLORIDE 200 MG: 100 TABLET ORAL at 19:36

## 2023-06-23 ASSESSMENT — PAIN DESCRIPTION - LOCATION
LOCATION: TEETH
LOCATION: OTHER (COMMENT)

## 2023-06-23 ASSESSMENT — PAIN SCALES - GENERAL
PAINLEVEL_OUTOF10: 4
PAINLEVEL_OUTOF10: 4
PAINLEVEL_OUTOF10: 0

## 2023-06-24 PROCEDURE — 6370000000 HC RX 637 (ALT 250 FOR IP): Performed by: PSYCHIATRY & NEUROLOGY

## 2023-06-24 PROCEDURE — 1240000000 HC EMOTIONAL WELLNESS R&B

## 2023-06-24 RX ADMIN — QUETIAPINE FUMARATE 400 MG: 200 TABLET ORAL at 20:29

## 2023-06-24 RX ADMIN — HYDROXYZINE HYDROCHLORIDE 50 MG: 25 TABLET, FILM COATED ORAL at 20:28

## 2023-06-24 RX ADMIN — IBUPROFEN 600 MG: 600 TABLET, FILM COATED ORAL at 06:13

## 2023-06-24 RX ADMIN — IBUPROFEN 600 MG: 600 TABLET, FILM COATED ORAL at 20:29

## 2023-06-24 RX ADMIN — TRAZODONE HYDROCHLORIDE 200 MG: 100 TABLET ORAL at 20:29

## 2023-06-24 ASSESSMENT — PAIN DESCRIPTION - DESCRIPTORS
DESCRIPTORS: ACHING

## 2023-06-24 ASSESSMENT — PAIN - FUNCTIONAL ASSESSMENT: PAIN_FUNCTIONAL_ASSESSMENT: ACTIVITIES ARE NOT PREVENTED

## 2023-06-24 ASSESSMENT — PAIN SCALES - GENERAL
PAINLEVEL_OUTOF10: 6
PAINLEVEL_OUTOF10: 7
PAINLEVEL_OUTOF10: 6

## 2023-06-24 ASSESSMENT — PAIN DESCRIPTION - LOCATION
LOCATION: TEETH

## 2023-06-25 PROCEDURE — 1240000000 HC EMOTIONAL WELLNESS R&B

## 2023-06-25 PROCEDURE — 6370000000 HC RX 637 (ALT 250 FOR IP): Performed by: PSYCHIATRY & NEUROLOGY

## 2023-06-25 RX ADMIN — QUETIAPINE FUMARATE 400 MG: 200 TABLET ORAL at 21:12

## 2023-06-25 RX ADMIN — VENLAFAXINE HYDROCHLORIDE 150 MG: 75 CAPSULE, EXTENDED RELEASE ORAL at 09:11

## 2023-06-25 RX ADMIN — IBUPROFEN 600 MG: 600 TABLET, FILM COATED ORAL at 21:12

## 2023-06-25 RX ADMIN — TRAZODONE HYDROCHLORIDE 200 MG: 100 TABLET ORAL at 21:11

## 2023-06-25 ASSESSMENT — PAIN DESCRIPTION - DESCRIPTORS: DESCRIPTORS: ACHING

## 2023-06-25 ASSESSMENT — PAIN DESCRIPTION - LOCATION: LOCATION: TEETH

## 2023-06-26 VITALS
WEIGHT: 218.1 LBS | DIASTOLIC BLOOD PRESSURE: 83 MMHG | HEIGHT: 65 IN | SYSTOLIC BLOOD PRESSURE: 105 MMHG | BODY MASS INDEX: 36.34 KG/M2 | HEART RATE: 78 BPM | TEMPERATURE: 98.1 F | RESPIRATION RATE: 16 BRPM | OXYGEN SATURATION: 96 %

## 2023-06-26 PROCEDURE — 1240000000 HC EMOTIONAL WELLNESS R&B

## 2023-06-26 PROCEDURE — 6370000000 HC RX 637 (ALT 250 FOR IP): Performed by: PSYCHIATRY & NEUROLOGY

## 2023-06-26 RX ORDER — LORAZEPAM 2 MG/ML
2 INJECTION INTRAMUSCULAR EVERY 6 HOURS PRN
Status: DISCONTINUED | OUTPATIENT
Start: 2023-06-26 | End: 2023-06-27 | Stop reason: HOSPADM

## 2023-06-26 RX ADMIN — TRAZODONE HYDROCHLORIDE 200 MG: 100 TABLET ORAL at 20:34

## 2023-06-26 RX ADMIN — QUETIAPINE FUMARATE 400 MG: 200 TABLET ORAL at 20:34

## 2023-06-26 RX ADMIN — IBUPROFEN 600 MG: 600 TABLET, FILM COATED ORAL at 17:38

## 2023-06-26 RX ADMIN — VENLAFAXINE HYDROCHLORIDE 150 MG: 75 CAPSULE, EXTENDED RELEASE ORAL at 09:13

## 2023-06-26 ASSESSMENT — PAIN DESCRIPTION - LOCATION: LOCATION: TEETH

## 2023-06-26 ASSESSMENT — PAIN SCALES - GENERAL
PAINLEVEL_OUTOF10: 4
PAINLEVEL_OUTOF10: 0
PAINLEVEL_OUTOF10: 0

## 2023-06-26 ASSESSMENT — PAIN DESCRIPTION - DESCRIPTORS: DESCRIPTORS: ACHING

## 2023-06-27 PROBLEM — F25.9 SCHIZOAFFECTIVE DISORDER (HCC): Status: ACTIVE | Noted: 2023-03-12

## 2023-06-27 PROCEDURE — 6370000000 HC RX 637 (ALT 250 FOR IP): Performed by: PSYCHIATRY & NEUROLOGY

## 2023-06-27 RX ORDER — QUETIAPINE FUMARATE 400 MG/1
400 TABLET, FILM COATED ORAL NIGHTLY
Qty: 30 TABLET | Refills: 0 | Status: SHIPPED | OUTPATIENT
Start: 2023-06-27

## 2023-06-27 RX ORDER — VENLAFAXINE HYDROCHLORIDE 150 MG/1
150 CAPSULE, EXTENDED RELEASE ORAL
Qty: 30 CAPSULE | Refills: 0 | Status: SHIPPED | OUTPATIENT
Start: 2023-06-28

## 2023-06-27 RX ORDER — TRAZODONE HYDROCHLORIDE 50 MG/1
50 TABLET ORAL NIGHTLY PRN
Qty: 30 TABLET | Refills: 0 | Status: SHIPPED | OUTPATIENT
Start: 2023-06-27

## 2023-06-27 RX ADMIN — VENLAFAXINE HYDROCHLORIDE 150 MG: 75 CAPSULE, EXTENDED RELEASE ORAL at 08:33

## 2023-08-17 ENCOUNTER — HOSPITAL ENCOUNTER (INPATIENT)
Facility: HOSPITAL | Age: 33
LOS: 6 days | Discharge: NURSING, REHAB OR LONG-TERM CARE | DRG: 750 | End: 2023-08-23
Attending: PSYCHIATRY & NEUROLOGY | Admitting: PSYCHIATRY & NEUROLOGY
Payer: MEDICAID

## 2023-08-17 ENCOUNTER — HOSPITAL ENCOUNTER (EMERGENCY)
Facility: HOSPITAL | Age: 33
Discharge: PSYCHIATRIC OR SUBSTANCE USE FACILITY | End: 2023-08-17
Attending: EMERGENCY MEDICINE
Payer: MEDICAID

## 2023-08-17 VITALS
TEMPERATURE: 98.2 F | RESPIRATION RATE: 12 BRPM | OXYGEN SATURATION: 95 % | SYSTOLIC BLOOD PRESSURE: 108 MMHG | HEART RATE: 87 BPM | BODY MASS INDEX: 35.78 KG/M2 | DIASTOLIC BLOOD PRESSURE: 65 MMHG | WEIGHT: 215 LBS

## 2023-08-17 DIAGNOSIS — D64.9 CHRONIC ANEMIA: Primary | ICD-10-CM

## 2023-08-17 LAB
ALBUMIN SERPL-MCNC: 3.7 G/DL (ref 3.5–5)
ALBUMIN/GLOB SERPL: 0.7 (ref 1.1–2.2)
ALP SERPL-CCNC: 88 U/L (ref 45–117)
ALT SERPL-CCNC: 24 U/L (ref 12–78)
AMPHET UR QL SCN: NEGATIVE
ANION GAP SERPL CALC-SCNC: 9 MMOL/L (ref 5–15)
APAP SERPL-MCNC: <2 UG/ML (ref 10–30)
APPEARANCE UR: CLEAR
AST SERPL-CCNC: 8 U/L (ref 15–37)
BACTERIA URNS QL MICRO: NEGATIVE /HPF
BARBITURATES UR QL SCN: NEGATIVE
BASOPHILS # BLD: 0 K/UL (ref 0–0.1)
BASOPHILS NFR BLD: 0 % (ref 0–1)
BENZODIAZ UR QL: NEGATIVE
BILIRUB SERPL-MCNC: 0.2 MG/DL (ref 0.2–1)
BILIRUB UR QL: NEGATIVE
BUN SERPL-MCNC: 8 MG/DL (ref 6–20)
BUN/CREAT SERPL: 13 (ref 12–20)
CALCIUM SERPL-MCNC: 9.4 MG/DL (ref 8.5–10.1)
CANNABINOIDS UR QL SCN: NEGATIVE
CHLORIDE SERPL-SCNC: 108 MMOL/L (ref 97–108)
CO2 SERPL-SCNC: 22 MMOL/L (ref 21–32)
COCAINE UR QL SCN: POSITIVE
COLOR UR: NORMAL
COMMENT:: NORMAL
CREAT SERPL-MCNC: 0.61 MG/DL (ref 0.55–1.02)
DIFFERENTIAL METHOD BLD: ABNORMAL
EKG ATRIAL RATE: 74 BPM
EKG DIAGNOSIS: NORMAL
EKG P AXIS: 79 DEGREES
EKG P-R INTERVAL: 200 MS
EKG Q-T INTERVAL: 412 MS
EKG QRS DURATION: 86 MS
EKG QTC CALCULATION (BAZETT): 457 MS
EKG R AXIS: 64 DEGREES
EKG T AXIS: 53 DEGREES
EKG VENTRICULAR RATE: 74 BPM
EOSINOPHIL # BLD: 0 K/UL (ref 0–0.4)
EOSINOPHIL NFR BLD: 0 % (ref 0–7)
EPITH CASTS URNS QL MICRO: NORMAL /LPF
ERYTHROCYTE [DISTWIDTH] IN BLOOD BY AUTOMATED COUNT: 17.9 % (ref 11.5–14.5)
ETHANOL SERPL-MCNC: 144 MG/DL (ref 0–0.08)
GLOBULIN SER CALC-MCNC: 5 G/DL (ref 2–4)
GLUCOSE SERPL-MCNC: 101 MG/DL (ref 65–100)
GLUCOSE UR STRIP.AUTO-MCNC: NEGATIVE MG/DL
HCT VFR BLD AUTO: 30.6 % (ref 35–47)
HGB BLD-MCNC: 8.8 G/DL (ref 11.5–16)
HGB UR QL STRIP: NEGATIVE
HYALINE CASTS URNS QL MICRO: NORMAL /LPF (ref 0–5)
IMM GRANULOCYTES # BLD AUTO: 0.1 K/UL (ref 0–0.04)
IMM GRANULOCYTES NFR BLD AUTO: 1 % (ref 0–0.5)
KETONES UR QL STRIP.AUTO: NEGATIVE MG/DL
LEUKOCYTE ESTERASE UR QL STRIP.AUTO: NEGATIVE
LYMPHOCYTES # BLD: 2.3 K/UL (ref 0.8–3.5)
LYMPHOCYTES NFR BLD: 21 % (ref 12–49)
Lab: ABNORMAL
MCH RBC QN AUTO: 21.4 PG (ref 26–34)
MCHC RBC AUTO-ENTMCNC: 28.8 G/DL (ref 30–36.5)
MCV RBC AUTO: 74.3 FL (ref 80–99)
METHADONE UR QL: NEGATIVE
MONOCYTES # BLD: 0.4 K/UL (ref 0–1)
MONOCYTES NFR BLD: 4 % (ref 5–13)
NEUTS SEG # BLD: 8.3 K/UL (ref 1.8–8)
NEUTS SEG NFR BLD: 74 % (ref 32–75)
NITRITE UR QL STRIP.AUTO: NEGATIVE
NRBC # BLD: 0 K/UL (ref 0–0.01)
NRBC BLD-RTO: 0 PER 100 WBC
OPIATES UR QL: NEGATIVE
PCP UR QL: NEGATIVE
PH UR STRIP: 5.5 (ref 5–8)
PLATELET # BLD AUTO: 612 K/UL (ref 150–400)
PMV BLD AUTO: 9 FL (ref 8.9–12.9)
POTASSIUM SERPL-SCNC: 3.9 MMOL/L (ref 3.5–5.1)
PROT SERPL-MCNC: 8.7 G/DL (ref 6.4–8.2)
PROT UR STRIP-MCNC: NEGATIVE MG/DL
RBC # BLD AUTO: 4.12 M/UL (ref 3.8–5.2)
RBC #/AREA URNS HPF: NORMAL /HPF (ref 0–5)
RBC MORPH BLD: ABNORMAL
SALICYLATES SERPL-MCNC: <1.7 MG/DL (ref 2.8–20)
SARS-COV-2 RNA RESP QL NAA+PROBE: NOT DETECTED
SODIUM SERPL-SCNC: 139 MMOL/L (ref 136–145)
SOURCE: NORMAL
SP GR UR REFRACTOMETRY: 1.01 (ref 1–1.03)
SPECIMEN HOLD: NORMAL
UROBILINOGEN UR QL STRIP.AUTO: 0.2 EU/DL (ref 0.2–1)
WBC # BLD AUTO: 11.1 K/UL (ref 3.6–11)
WBC URNS QL MICRO: NORMAL /HPF (ref 0–4)

## 2023-08-17 PROCEDURE — 80143 DRUG ASSAY ACETAMINOPHEN: CPT

## 2023-08-17 PROCEDURE — 80307 DRUG TEST PRSMV CHEM ANLYZR: CPT

## 2023-08-17 PROCEDURE — 99285 EMERGENCY DEPT VISIT HI MDM: CPT

## 2023-08-17 PROCEDURE — 80053 COMPREHEN METABOLIC PANEL: CPT

## 2023-08-17 PROCEDURE — 1240000000 HC EMOTIONAL WELLNESS R&B

## 2023-08-17 PROCEDURE — 81001 URINALYSIS AUTO W/SCOPE: CPT

## 2023-08-17 PROCEDURE — 87635 SARS-COV-2 COVID-19 AMP PRB: CPT

## 2023-08-17 PROCEDURE — 80179 DRUG ASSAY SALICYLATE: CPT

## 2023-08-17 PROCEDURE — 6370000000 HC RX 637 (ALT 250 FOR IP): Performed by: EMERGENCY MEDICINE

## 2023-08-17 PROCEDURE — 6370000000 HC RX 637 (ALT 250 FOR IP): Performed by: PSYCHIATRY & NEUROLOGY

## 2023-08-17 PROCEDURE — 82077 ASSAY SPEC XCP UR&BREATH IA: CPT

## 2023-08-17 PROCEDURE — 85025 COMPLETE CBC W/AUTO DIFF WBC: CPT

## 2023-08-17 PROCEDURE — 93005 ELECTROCARDIOGRAM TRACING: CPT | Performed by: EMERGENCY MEDICINE

## 2023-08-17 PROCEDURE — 90791 PSYCH DIAGNOSTIC EVALUATION: CPT

## 2023-08-17 PROCEDURE — 6370000000 HC RX 637 (ALT 250 FOR IP): Performed by: INTERNAL MEDICINE

## 2023-08-17 PROCEDURE — 36415 COLL VENOUS BLD VENIPUNCTURE: CPT

## 2023-08-17 RX ORDER — CHLORDIAZEPOXIDE HYDROCHLORIDE 10 MG/1
10 CAPSULE, GELATIN COATED ORAL 3 TIMES DAILY
Status: DISCONTINUED | OUTPATIENT
Start: 2023-08-17 | End: 2023-08-21

## 2023-08-17 RX ORDER — M-VIT,TX,IRON,MINS/CALC/FOLIC 27MG-0.4MG
1 TABLET ORAL DAILY
Status: DISCONTINUED | OUTPATIENT
Start: 2023-08-17 | End: 2023-08-23 | Stop reason: HOSPADM

## 2023-08-17 RX ORDER — POLYETHYLENE GLYCOL 3350 17 G/17G
17 POWDER, FOR SOLUTION ORAL DAILY PRN
Status: DISCONTINUED | OUTPATIENT
Start: 2023-08-17 | End: 2023-08-23 | Stop reason: HOSPADM

## 2023-08-17 RX ORDER — LANOLIN ALCOHOL/MO/W.PET/CERES
100 CREAM (GRAM) TOPICAL DAILY
Status: DISCONTINUED | OUTPATIENT
Start: 2023-08-17 | End: 2023-08-23 | Stop reason: HOSPADM

## 2023-08-17 RX ORDER — FOLIC ACID 1 MG/1
1 TABLET ORAL DAILY
Status: DISCONTINUED | OUTPATIENT
Start: 2023-08-17 | End: 2023-08-23 | Stop reason: HOSPADM

## 2023-08-17 RX ORDER — HALOPERIDOL 5 MG/1
5 TABLET ORAL EVERY 4 HOURS PRN
Status: DISCONTINUED | OUTPATIENT
Start: 2023-08-17 | End: 2023-08-23 | Stop reason: HOSPADM

## 2023-08-17 RX ORDER — ACETAMINOPHEN 325 MG/1
650 TABLET ORAL EVERY 4 HOURS PRN
Status: DISCONTINUED | OUTPATIENT
Start: 2023-08-17 | End: 2023-08-23 | Stop reason: HOSPADM

## 2023-08-17 RX ORDER — DIPHENHYDRAMINE HYDROCHLORIDE 50 MG/ML
50 INJECTION INTRAMUSCULAR; INTRAVENOUS EVERY 4 HOURS PRN
Status: DISCONTINUED | OUTPATIENT
Start: 2023-08-17 | End: 2023-08-19

## 2023-08-17 RX ORDER — HALOPERIDOL 5 MG/ML
5 INJECTION INTRAMUSCULAR EVERY 4 HOURS PRN
Status: DISCONTINUED | OUTPATIENT
Start: 2023-08-17 | End: 2023-08-23 | Stop reason: HOSPADM

## 2023-08-17 RX ORDER — TRAZODONE HYDROCHLORIDE 50 MG/1
50 TABLET ORAL NIGHTLY PRN
Status: DISCONTINUED | OUTPATIENT
Start: 2023-08-17 | End: 2023-08-18 | Stop reason: DRUGHIGH

## 2023-08-17 RX ORDER — LORAZEPAM 0.5 MG/1
1 TABLET ORAL ONCE
Status: COMPLETED | OUTPATIENT
Start: 2023-08-17 | End: 2023-08-17

## 2023-08-17 RX ORDER — MAGNESIUM HYDROXIDE/ALUMINUM HYDROXICE/SIMETHICONE 120; 1200; 1200 MG/30ML; MG/30ML; MG/30ML
30 SUSPENSION ORAL EVERY 6 HOURS PRN
Status: DISCONTINUED | OUTPATIENT
Start: 2023-08-17 | End: 2023-08-23 | Stop reason: HOSPADM

## 2023-08-17 RX ORDER — HYDROXYZINE 50 MG/1
50 TABLET, FILM COATED ORAL 3 TIMES DAILY PRN
Status: DISCONTINUED | OUTPATIENT
Start: 2023-08-17 | End: 2023-08-23 | Stop reason: HOSPADM

## 2023-08-17 RX ADMIN — MULTIPLE VITAMINS W/ MINERALS TAB 1 TABLET: TAB at 17:30

## 2023-08-17 RX ADMIN — HALOPERIDOL 5 MG: 5 TABLET ORAL at 20:46

## 2023-08-17 RX ADMIN — LORAZEPAM 1 MG: 0.5 TABLET ORAL at 06:14

## 2023-08-17 RX ADMIN — TRAZODONE HYDROCHLORIDE 50 MG: 50 TABLET ORAL at 20:44

## 2023-08-17 RX ADMIN — Medication 100 MG: at 17:29

## 2023-08-17 RX ADMIN — FOLIC ACID 1 MG: 1 TABLET ORAL at 17:29

## 2023-08-17 RX ADMIN — CHLORDIAZEPOXIDE HYDROCHLORIDE 10 MG: 10 CAPSULE ORAL at 17:30

## 2023-08-17 SDOH — ECONOMIC STABILITY: HOUSING INSECURITY
IN THE LAST 12 MONTHS, WAS THERE A TIME WHEN YOU DID NOT HAVE A STEADY PLACE TO SLEEP OR SLEPT IN A SHELTER (INCLUDING NOW)?: YES

## 2023-08-17 SDOH — ECONOMIC STABILITY: INCOME INSECURITY: IN THE LAST 12 MONTHS, WAS THERE A TIME WHEN YOU WERE NOT ABLE TO PAY THE MORTGAGE OR RENT ON TIME?: YES

## 2023-08-17 SDOH — ECONOMIC STABILITY: HOUSING INSECURITY: IN THE LAST 12 MONTHS, HOW MANY PLACES HAVE YOU LIVED?: 2

## 2023-08-17 ASSESSMENT — PATIENT HEALTH QUESTIONNAIRE - PHQ9
7. TROUBLE CONCENTRATING ON THINGS, SUCH AS READING THE NEWSPAPER OR WATCHING TELEVISION: MORE THAN HALF THE DAYS
9. THOUGHTS THAT YOU WOULD BE BETTER OFF DEAD, OR OF HURTING YOURSELF: MORE THAN HALF THE DAYS
5. POOR APPETITE OR OVEREATING: NOT AT ALL
4. FEELING TIRED OR HAVING LITTLE ENERGY: MORE THAN HALF THE DAYS
10. IF YOU CHECKED OFF ANY PROBLEMS, HOW DIFFICULT HAVE THESE PROBLEMS MADE IT FOR YOU TO DO YOUR WORK, TAKE CARE OF THINGS AT HOME, OR GET ALONG WITH OTHER PEOPLE: VERY DIFFICULT
10. IF YOU CHECKED OFF ANY PROBLEMS, HOW DIFFICULT HAVE THESE PROBLEMS MADE IT FOR YOU TO DO YOUR WORK, TAKE CARE OF THINGS AT HOME, OR GET ALONG WITH OTHER PEOPLE: VERY DIFFICULT
3. TROUBLE FALLING OR STAYING ASLEEP: MORE THAN HALF THE DAYS
7. TROUBLE CONCENTRATING ON THINGS, SUCH AS READING THE NEWSPAPER OR WATCHING TELEVISION: MORE THAN HALF THE DAYS
8. MOVING OR SPEAKING SO SLOWLY THAT OTHER PEOPLE COULD HAVE NOTICED. OR THE OPPOSITE, BEING SO FIGETY OR RESTLESS THAT YOU HAVE BEEN MOVING AROUND A LOT MORE THAN USUAL: MORE THAN HALF THE DAYS
2. FEELING DOWN, DEPRESSED OR HOPELESS: MORE THAN HALF THE DAYS
1. LITTLE INTEREST OR PLEASURE IN DOING THINGS: MORE THAN HALF THE DAYS
SUM OF ALL RESPONSES TO PHQ9 QUESTIONS 1 & 2: 4
SUM OF ALL RESPONSES TO PHQ QUESTIONS 1-9: 15
SUM OF ALL RESPONSES TO PHQ QUESTIONS 1-9: 17
2. FEELING DOWN, DEPRESSED OR HOPELESS: SEVERAL DAYS
5. POOR APPETITE OR OVEREATING: SEVERAL DAYS
6. FEELING BAD ABOUT YOURSELF - OR THAT YOU ARE A FAILURE OR HAVE LET YOURSELF OR YOUR FAMILY DOWN: MORE THAN HALF THE DAYS
SUM OF ALL RESPONSES TO PHQ9 QUESTIONS 1 & 2: 4
9. THOUGHTS THAT YOU WOULD BE BETTER OFF DEAD, OR OF HURTING YOURSELF: SEVERAL DAYS
2. FEELING DOWN, DEPRESSED OR HOPELESS: MORE THAN HALF THE DAYS
10. IF YOU CHECKED OFF ANY PROBLEMS, HOW DIFFICULT HAVE THESE PROBLEMS MADE IT FOR YOU TO DO YOUR WORK, TAKE CARE OF THINGS AT HOME, OR GET ALONG WITH OTHER PEOPLE: VERY DIFFICULT
8. MOVING OR SPEAKING SO SLOWLY THAT OTHER PEOPLE COULD HAVE NOTICED. OR THE OPPOSITE, BEING SO FIGETY OR RESTLESS THAT YOU HAVE BEEN MOVING AROUND A LOT MORE THAN USUAL: MORE THAN HALF THE DAYS
1. LITTLE INTEREST OR PLEASURE IN DOING THINGS: SEVERAL DAYS
SUM OF ALL RESPONSES TO PHQ9 QUESTIONS 1 & 2: 2
4. FEELING TIRED OR HAVING LITTLE ENERGY: MORE THAN HALF THE DAYS
3. TROUBLE FALLING OR STAYING ASLEEP: MORE THAN HALF THE DAYS
6. FEELING BAD ABOUT YOURSELF - OR THAT YOU ARE A FAILURE OR HAVE LET YOURSELF OR YOUR FAMILY DOWN: MORE THAN HALF THE DAYS
SUM OF ALL RESPONSES TO PHQ QUESTIONS 1-9: 13
1. LITTLE INTEREST OR PLEASURE IN DOING THINGS: MORE THAN HALF THE DAYS

## 2023-08-17 ASSESSMENT — SLEEP AND FATIGUE QUESTIONNAIRES
DO YOU HAVE DIFFICULTY SLEEPING: YES
DO YOU USE A SLEEP AID: YES
AVERAGE NUMBER OF SLEEP HOURS: 3
SLEEP PATTERN: DIFFICULTY FALLING ASLEEP;RESTLESSNESS;INSOMNIA

## 2023-08-17 ASSESSMENT — ENCOUNTER SYMPTOMS
NAUSEA: 0
STRIDOR: 0
WHEEZING: 0
TROUBLE SWALLOWING: 0
HYPERVENTILATION: 0
CHEST TIGHTNESS: 0
EYE DISCHARGE: 0
FACIAL SWELLING: 0
COUGH: 0
SINUS PAIN: 0
BACK PAIN: 0
EYES NEGATIVE: 1
DIARRHEA: 0
COLOR CHANGE: 0
ABDOMINAL PAIN: 0
VOMITING: 0
SINUS PRESSURE: 0
BLOOD IN STOOL: 0
SORE THROAT: 0
EYE PAIN: 0
RHINORRHEA: 0
SHORTNESS OF BREATH: 0

## 2023-08-17 ASSESSMENT — PAIN DESCRIPTION - DESCRIPTORS: DESCRIPTORS: DISCOMFORT

## 2023-08-17 ASSESSMENT — LIFESTYLE VARIABLES
HOW MANY STANDARD DRINKS CONTAINING ALCOHOL DO YOU HAVE ON A TYPICAL DAY: 5 OR 6
HOW MANY STANDARD DRINKS CONTAINING ALCOHOL DO YOU HAVE ON A TYPICAL DAY: 5 OR 6
HOW OFTEN DO YOU HAVE A DRINK CONTAINING ALCOHOL: 4 OR MORE TIMES A WEEK
HOW OFTEN DO YOU HAVE A DRINK CONTAINING ALCOHOL: 4 OR MORE TIMES A WEEK

## 2023-08-17 ASSESSMENT — PAIN SCALES - GENERAL
PAINLEVEL_OUTOF10: 0
PAINLEVEL_OUTOF10: 3

## 2023-08-17 ASSESSMENT — PAIN DESCRIPTION - LOCATION: LOCATION: ABDOMEN

## 2023-08-17 ASSESSMENT — PAIN DESCRIPTION - ORIENTATION: ORIENTATION: LOWER

## 2023-08-17 NOTE — ED TRIAGE NOTES
Pt arrives ambulatory via EMS for auditory hallucinations and SI. Pt states he always has SI passively and this is unchanged, he just sometimes can't cope with it as well as usual. Hx of schizophrenia. He reports he was just discharged yesterday from a psych admission. A&OX4.

## 2023-08-17 NOTE — ED PROVIDER NOTES
[facility] PSYCHIATRIC [place] EMERGENCY DEP  EMERGENCY DEPARTMENT ENCOUNTER      Pt Name: Katty Coreas  MRN: [id number]  [address] [place] 1990  Date of evaluation: 8/17/2023  Provider: Dylan Beauchamp MD    CHIEF COMPLAINT       Chief Complaint   Patient presents with    Suicidal    Hallucinations         HISTORY OF PRESENT ILLNESS   (Location/Symptom, Timing/Onset, Context/Setting, Quality, Duration, Modifying Factors, Severity)  Note limiting factors. 66-year-old female with suicidal ideation. She states she has thought about cutting herself. She also thought about injuring other people. She was discharged yesterday from [facility] where she had been hospitalized for 10 days for suicidal thoughts there. She has taken no action today to hurt herself or anybody else. She is cooperative and voluntary in the ER today. The history is provided by the patient. Mental Health Problem  Presenting symptoms: hallucinations, homicidal ideas and suicidal thoughts    Presenting symptoms: no aggressive behavior, no agitation, no delusions, no depression, no disorganized speech and no paranoid behavior    Degree of incapacity (severity):  Mild  Onset quality:  Gradual  Timing:  Constant  Progression:  Worsening  Chronicity:  Recurrent  Context: stressful life event    Context: not alcohol use, not drug abuse, not medication, not noncompliant and not recent medication change    Treatment compliance:  Untreated  Relieved by:  Nothing  Worsened by:  Nothing  Ineffective treatments:  None tried  Associated symptoms: no abdominal pain, no anhedonia, no anxiety, no appetite change, no chest pain, no feelings of worthlessness, no headaches, no hypersomnia and no hyperventilation        Review of External Medical Records:     Nursing Notes were reviewed. REVIEW OF SYSTEMS    (2-9 systems for level 4, 10 or more for level 5)     Review of Systems   Constitutional:  Negative for activity change, appetite change, chills and diaphoresis. PHYSICAL EXAM    (up to 7 for level 4, 8 or more for level 5)     ED Triage Vitals   BP Temp Temp src Pulse Resp SpO2 Height Weight   -- -- -- -- -- -- -- --       There is no height or weight on file to calculate BMI. Physical Exam  Vitals and nursing note reviewed. Constitutional:       Appearance: He is normal weight. HENT:      Head: Normocephalic and atraumatic. Eyes:      Extraocular Movements: Extraocular movements intact. Conjunctiva/sclera: Conjunctivae normal.      Pupils: Pupils are equal, round, and reactive to light. Cardiovascular:      Rate and Rhythm: Normal rate and regular rhythm. Pulses: Normal pulses. Heart sounds: Normal heart sounds. No murmur heard. Pulmonary:      Effort: Pulmonary effort is normal. No respiratory distress. Breath sounds: No stridor. No wheezing. Abdominal:      General: Abdomen is flat. Bowel sounds are normal. There is no distension. Palpations: Abdomen is soft. Tenderness: There is no abdominal tenderness. Musculoskeletal:         General: Normal range of motion. Cervical back: Normal range of motion. No rigidity. Lymphadenopathy:      Cervical: No cervical adenopathy. Skin:     General: Skin is warm and dry. Neurological:      General: No focal deficit present. Mental Status: He is alert and oriented to person, place, and time. Mental status is at baseline.    Psychiatric:         Mood and Affect: Mood normal.         Behavior: Behavior normal.       DIAGNOSTIC RESULTS     EKG: All EKG's are interpreted by the Emergency Department Physician who either signs or Co-signs this chart in the absence of a cardiologist.        RADIOLOGY:   Non-plain film images such as CT, Ultrasound and MRI are read by the radiologist. Plain radiographic images are visualized and preliminarily interpreted by the emergency physician with the below findings:        Interpretation per the Radiologist below, if available at the

## 2023-08-17 NOTE — BSMART NOTE
Comprehensive Assessment Form Part 1      Section I - Disposition    Primary Diagnosis: F25.0  Schizoaffective Disorder bi-polar type ( Historical)   Secondary Diagnosis:     The Medical Doctor to Psychiatrist conference was notcompleted. The Medical Doctor is in agreement with Psychiatrist disposition because of (reason) n/a. The plan is voluntary hospitalization . The on-call Psychiatrist consulted was Dr. Alysia Montes. The admitting Psychiatrist will be Dr. Alysia Bernabe. The admitting Diagnosis is F25.0 Schizoaffective Disorder bi-polar type. The Payor source is [employer] . The name of the representative was Self . This writer reviewed the [address] [place] [address] in nursing flowsheet and the risk level assigned is high risk. Based on this assessment, the risk of suicide is moderate risk due to historical attempts and current passive suicidal ideation and the plan is for voluntary hospitalization. Section II - Integrated Summary  Summary: An assessment was conducted via face to face in the hospital ED, in attendance was this writer and Loree Dumont. It should be noted that Arya Dillard has the preferred pronouns of Him/He/His. At start of the assessment it was determined that Arya Dillard was oriented x4 and willing to participate in the assessment. Arya Dillard began the assessment by informing this writer on the events that led to him seeking hospital services today. Arya Dillard informed this writer that he was discharged from [address] [place] [address] on 8/16/2023. Arya Dillard stated that he was admitted on 8/10/23 but do to interpersonal pressures from an significant other, he decided to end his hospitalization and be with his significant other. It should be noted that Arya Dillard has had multiple hospitalizations within the past 60 days which is stated is due to not following up or maintaining his mental health supports.    During the time with his significant other, Eleanor informed this writer that he was using cocaine and alcohol and began to experienced elevated mental health symptoms. Nicholette Sacks reported that he experiences auditory hallucinations in the form of voices which at times will be loud enough to disrupt his thinking. Nicholette Sacks reported that the hallucinations became intense enough that he began to experience passive suicidal ideation with no plan but he did report a history of attempt. Nicholette Sacks stated that he last attempted suicide by overdose in 2022 and engaged in self-injurious behavior 3-4 years ago in the form of cutting. There were no reported concerns regarding homicidal ideation but did report high levels of paranoia and anxiety due to his mental health symptoms. During the assessment it should be noted that Nicholette Sacks presented as manic, tense, paranoid and highly anxious. There were no reported concerns regarding his eating and sleeping patterns but Nicholette Sacks did express concern regarding his medications which he believes have not been adjusted correctly. As stated earlier, Nicholette Sacks did report a history of substance abuse and stated that he will drink on a daily basis when not hospitalized but will use cocaine and marijuana socially and not daily. Nicholette Sacks did express to this writer that he does experience high levels of paranoia at times and will generally believe that people are out to harm him to the point where he will visualize acts of harm against him. Due to patient's presentation and mental health symptoms, voluntary hospitalization will be explored. The patient has demonstrated mental capacity to provide informed consent. The information is given by the patient. The Chief Complaint is auditory hallucinations, passive suicidal ideation . The Precipitant Factors are non compliance of medications, drug use, interpersonal stressors .   Previous Hospitalizations: Patient reports multiple hospitalizations  The patient has not previously been in

## 2023-08-17 NOTE — PROGRESS NOTES
B[de-identified] Pt has been out and about on unit this evening, states she is okay. Denies suicidal and homicidal thoughts. Says she has had some depression and anxiety and that's what led her to coming into the hospital. Has voiced that she wants to leave unit and go home. States she continues to hear voices, but has no visual hallucinations. Encouraged to attend groups, work on coping skills, and take meds as directed. Safe on unit. I: Maintain a safe environment for pt, safety cks q 15 mins and prn. Give meds as ordered, and encourage groups. R: Pt is fairly cooperative with assessment, states she is okay but wants to go home. Continues to have auditory hallucinations and is guarded with staff. Denies suicidal and homicidal thoughts, safe on unit. P: Continue to monitor, give meds as ordered, encourage groups.

## 2023-08-17 NOTE — ED NOTES
Hospital to Home eta 11:15 for transport to [facility].      Mona Awan  08/17/23 [facility]  08/17/23 8624

## 2023-08-17 NOTE — ED NOTES
Patient calm and cooperative, resting in bed. Talking to 1:1 sitter.       Helio Manzanares  08/17/23 7264

## 2023-08-17 NOTE — GROUP NOTE
Group Therapy Note    Date: 8/17/2023    Group Start Time: 8709  Group End Time: 0498  Group Topic: Process Group - Inpatient    [facility]    [address] [place] [address]        Group Therapy Note    Attendees: 1/4    Note: Writer converted this session into individual session since pt only one in attendance. Writer facilitated a 1:1 processing therapy session and encouraged patient to discuss discharge plans, etc. Writer held the space as pt processed. Writer implemented mindfulness and other grounding exercises. Writer concluded session with a grounding exercise. Pt not yet admitted at time of group.        Signature:  6 [facility]

## 2023-08-17 NOTE — BSMART NOTE
BSMART assessment completed, and suicide risk level noted to be moderate risk due to historical attempts. Charge Nurse Vesna Chavez and Physician Dr. Manas Ngo notified. Concerns not observed. Security/Off- has not been notified.

## 2023-08-17 NOTE — GROUP NOTE
Group Therapy Note    Date: 8/17/2023    Group Start Time: 7707  Group End Time: 4129  Group Topic: Psychoeducation    [facility] [address] [facility], [place]        Group Therapy Note    Attendees: 1/4    Writer converted this session into an individual session since pt only one in group. Writer provided a psych-ed handout regarding grounding exercises to help manage anxiety. Writer encouraged patient to discuss what grounding exercises work for her during discussion and provided examples. Pt was not admitted on unit at time of group.        Signature:  Amira Sommer

## 2023-08-17 NOTE — BSMART NOTE
Pt accepted by Dr. Amara De La Cruz to bed# 104/01. Nursing report [phone]. Charge/Staci updated on admission.

## 2023-08-18 PROBLEM — F25.0 SCHIZOAFFECTIVE DISORDER, BIPOLAR TYPE (HCC): Status: RESOLVED | Noted: 2019-12-23 | Resolved: 2023-08-18

## 2023-08-18 PROBLEM — F14.10 COCAINE USE DISORDER (HCC): Status: ACTIVE | Noted: 2023-08-18

## 2023-08-18 PROBLEM — F10.20 ALCOHOL USE DISORDER, MODERATE, DEPENDENCE (HCC): Status: ACTIVE | Noted: 2020-09-11

## 2023-08-18 PROBLEM — R45.850 HOMICIDAL IDEATION: Status: RESOLVED | Noted: 2023-03-12 | Resolved: 2023-08-18

## 2023-08-18 PROBLEM — F25.1 SCHIZOAFFECTIVE DISORDER, DEPRESSIVE TYPE (HCC): Status: ACTIVE | Noted: 2023-03-12

## 2023-08-18 PROBLEM — R45.851 SUICIDAL IDEATION: Status: RESOLVED | Noted: 2020-09-11 | Resolved: 2023-08-18

## 2023-08-18 PROCEDURE — 6370000000 HC RX 637 (ALT 250 FOR IP): Performed by: PSYCHIATRY & NEUROLOGY

## 2023-08-18 PROCEDURE — 1240000000 HC EMOTIONAL WELLNESS R&B

## 2023-08-18 RX ORDER — QUETIAPINE FUMARATE 200 MG/1
400 TABLET, FILM COATED ORAL NIGHTLY
Status: DISCONTINUED | OUTPATIENT
Start: 2023-08-18 | End: 2023-08-23 | Stop reason: HOSPADM

## 2023-08-18 RX ORDER — ESCITALOPRAM OXALATE 10 MG/1
10 TABLET ORAL DAILY
Status: DISCONTINUED | OUTPATIENT
Start: 2023-08-18 | End: 2023-08-23 | Stop reason: HOSPADM

## 2023-08-18 RX ADMIN — CHLORDIAZEPOXIDE HYDROCHLORIDE 10 MG: 10 CAPSULE ORAL at 20:43

## 2023-08-18 RX ADMIN — TRAZODONE HYDROCHLORIDE 150 MG: 100 TABLET ORAL at 20:43

## 2023-08-18 RX ADMIN — QUETIAPINE FUMARATE 400 MG: 200 TABLET ORAL at 20:43

## 2023-08-18 RX ADMIN — CHLORDIAZEPOXIDE HYDROCHLORIDE 10 MG: 10 CAPSULE ORAL at 16:06

## 2023-08-18 RX ADMIN — CHLORDIAZEPOXIDE HYDROCHLORIDE 10 MG: 10 CAPSULE ORAL at 08:25

## 2023-08-18 RX ADMIN — ESCITALOPRAM OXALATE 10 MG: 10 TABLET ORAL at 16:06

## 2023-08-18 NOTE — PROGRESS NOTES
Pt refused Librium at [facility] but consented to take Trazodone and PRN Haldol. Bon Powell

## 2023-08-18 NOTE — PROGRESS NOTES
Pt is sitting in Group Room talking and cussing to herself. Hostile to staff when approached about a snack or participating in group. States she wants to leave the hospital. .

## 2023-08-18 NOTE — GROUP NOTE
Group Therapy Note    Date: 8/18/2023    Group Start Time: 0830  Group End Time: 4515  Group Topic: Nursing    [facility] [address], LPN        Group Therapy Note    Attendees: 5       Patient's Goal:  TO TAKE MEDS. Notes:  MEDICATIONS    Status After Intervention:  Improved    Participation Level:  Active Listener    Participation Quality: Appropriate      Speech:  normal      Thought Process/Content: Logical      Affective Functioning: Congruent      Mood:  CALM      Level of consciousness:  Alert      Response to Learning: Able to verbalize current knowledge/experience      Endings: None Reported    Modes of Intervention: Education      Discipline Responsible: Licensed Practical Nurse      Signature:  Michel Stahl LPN

## 2023-08-18 NOTE — GROUP NOTE
Group Therapy Note    Date: 8/17/2023    Group Start Time: 2000  Group End Time: 2045  Group Topic: Wrap-Up    [facility] BSMART    Nydia De León        Group Therapy Note    Attendees: 1         Patient's Goal:  none    Notes:  talking to self in group    Status After Intervention:  Improved    Participation Level: Monopolizing    Participation Quality: Supportive      Speech:  talking to self       Thought Process/Content: Flight of ideas      Affective Functioning: Blunted      Mood: angry      Level of consciousness:  Alert      Response to Learning: Able to change behavior      Endings: None Reported    Modes of Intervention: Support      Discipline Responsible: Behavorial Health Tech      Signature:  Nydia De León

## 2023-08-18 NOTE — H&P
INITIAL PSYCHIATRIC EVALUATION            IDENTIFICATION:    Patient Name  Anna Brunson   Date of Birth 1990   [facility] [id number]   Medical Record Number  [id number]      Age  26 y.o. PCP No primary care provider on file. Admit date:  8/17/2023    Room Number  104/01  @ [address] [facility]   Date of Service  8/18/2023            HISTORY         REASON FOR HOSPITALIZATION:  CC: \"Suicidal ideation\". HISTORY OF PRESENT ILLNESS:    The patient, Anna Brunson, is a 26 y.o. Black /  adult transgender female to male goes by  Ganipara" with a past psychiatric history significant for schizoaffective disorder, stimulant use disorder, alcohol use disorder and cannabis use disorder admitted to [facility] for worsening of mood symptoms, suicidal and homicidal ideations. Patient stated that he is going a lot of stress recently. Reported feeling depressed for the last few days. Patient was recently admitted to [address] [place] [address] for depression on August 10 and left AMA on August 15 due to relationship issues with his partner. He also reported having suicidal and homicidal ideations. Reported having homicidal thoughts towards people in general and denied any intent to act on those thoughts. Reported feeling paranoid sometimes. Reported hearing voices telling him to do things but denied voices telling him to hurt himself or others. Reported poor sleep. Reported low energy level sometimes. Feeling hopeless. Denied any anhedonia. Denied any anxiety or panic attacks. Denied any symptoms related to sunday. Patient reported regular cocaine use and reported smoking it. He last used prior to coming to the hospital.  He also reported daily alcohol use and drinks about 2-12 beers per day. He last used alcohol prior to coming to the hospital.  Denied any withdrawal symptoms. Denied any history of withdrawal seizures or delirium tremens.   He also reported
significant change was found           Imaging:   No orders to display         Assessment:     Schizophrenia with acute exacerbation  Polysubstance use including cocaine  History of alcohol use  Morbid obesity with BMI of 35      Plan:     Patient seen and examined in the psychiatry unit. Clinically and medically stable. Continue all psychiatric evaluation and treatments. We will add thiamine, folate and multivitamin tablets daily. We will continue to follow alongside.   Thank you for involving us in the care of this patient    Signed By: Nava Walton MD     August 17, 2023

## 2023-08-18 NOTE — GROUP NOTE
Group Therapy Note    Date: 8/18/2023    Group Start Time: 0900  Group End Time: 0930  Group Topic: Community Meeting    [facility]        Group Therapy Note    Attendee 4       Patient's Goal:  To work myself while I am  here    Notes:   Slept 8 hours    Status After Intervention:  Improved    Participation Level:  Active Listener and Interactive    Participation Quality: Appropriate, Attentive, and Sharing      Speech:  normal      Thought Process/Content: Logical      Affective Functioning: Congruent      Mood: elevated      Level of consciousness:  Alert and Attentive      Response to Learning: Able to verbalize current knowledge/experience and Able to retain information      Endings: None Reported    Modes of Intervention: Support      Discipline Responsible: [address] [facility]      Signature:  [place]

## 2023-08-19 PROCEDURE — 6370000000 HC RX 637 (ALT 250 FOR IP): Performed by: PSYCHIATRY & NEUROLOGY

## 2023-08-19 PROCEDURE — 1240000000 HC EMOTIONAL WELLNESS R&B

## 2023-08-19 RX ORDER — LORAZEPAM 1 MG/1
2 TABLET ORAL EVERY 4 HOURS PRN
Status: DISCONTINUED | OUTPATIENT
Start: 2023-08-19 | End: 2023-08-23 | Stop reason: HOSPADM

## 2023-08-19 RX ORDER — DIPHENHYDRAMINE HCL 25 MG
50 TABLET ORAL EVERY 6 HOURS PRN
Status: DISCONTINUED | OUTPATIENT
Start: 2023-08-19 | End: 2023-08-23 | Stop reason: HOSPADM

## 2023-08-19 RX ADMIN — TRAZODONE HYDROCHLORIDE 150 MG: 100 TABLET ORAL at 21:22

## 2023-08-19 RX ADMIN — QUETIAPINE FUMARATE 400 MG: 200 TABLET ORAL at 21:22

## 2023-08-19 RX ADMIN — DIPHENHYDRAMINE HYDROCHLORIDE 50 MG: 25 TABLET ORAL at 15:28

## 2023-08-19 RX ADMIN — CHLORDIAZEPOXIDE HYDROCHLORIDE 10 MG: 10 CAPSULE ORAL at 08:31

## 2023-08-19 RX ADMIN — CHLORDIAZEPOXIDE HYDROCHLORIDE 10 MG: 10 CAPSULE ORAL at 21:22

## 2023-08-19 RX ADMIN — HALOPERIDOL 5 MG: 5 TABLET ORAL at 15:28

## 2023-08-19 RX ADMIN — LORAZEPAM 2 MG: 1 TABLET ORAL at 15:28

## 2023-08-19 RX ADMIN — ESCITALOPRAM OXALATE 10 MG: 10 TABLET ORAL at 08:31

## 2023-08-19 RX ADMIN — CHLORDIAZEPOXIDE HYDROCHLORIDE 10 MG: 10 CAPSULE ORAL at 14:04

## 2023-08-19 NOTE — GROUP NOTE
Group Therapy Note    Date: 8/18/2023    Group Start Time: 2000  Group End Time: 2045  Group Topic: Wrap-Up    [facility] [address], CNA        Group Therapy Note    Attendees: 3       Patient's Goal:  none    Notes:  participated in group    Status After Intervention:  Unchanged    Participation Level: Active Listener and Interactive    Participation Quality: Appropriate and Attentive      Speech:  normal      Thought Process/Content: Logical      Affective Functioning: Congruent      Mood: anxious and depressed      Level of consciousness:  Alert and Oriented x4      Response to Learning: Able to verbalize current knowledge/experience, Able to verbalize/acknowledge new learning, Able to retain information, Capable of insight, Able to change behavior, and Progressing to goal      Endings: Suicidality    Modes of Intervention: Activity      Discipline Responsible: [facility]      Signature:   Cristiane Buck CNA

## 2023-08-19 NOTE — GROUP NOTE
Group Therapy Note    Date: 8/19/2023    Group Start Time: 0900  Group End Time: 0930  Group Topic: Community Meeting    [facility]        Group Therapy Note    Attendees:0       Patient's Goal:  None    Notes:  N/A    Status After Intervention:  Unchanged    Participation Level: None    Participation Quality: Resistant      Speech:  hesitant      Thought Process/Content: Perseverating      Affective Functioning: Congruent      Mood:  Quiet reserved      Level of consciousness:  Preoccupied and Inattentive      Response to Learning: Resistant      Endings: None Reported    Modes of Intervention: Support      Discipline Responsible: [address]      Signature:  Jeni Cherry

## 2023-08-20 PROCEDURE — 6370000000 HC RX 637 (ALT 250 FOR IP): Performed by: PSYCHIATRY & NEUROLOGY

## 2023-08-20 PROCEDURE — 1240000000 HC EMOTIONAL WELLNESS R&B

## 2023-08-20 RX ADMIN — TRAZODONE HYDROCHLORIDE 150 MG: 100 TABLET ORAL at 21:12

## 2023-08-20 RX ADMIN — CHLORDIAZEPOXIDE HYDROCHLORIDE 10 MG: 10 CAPSULE ORAL at 21:12

## 2023-08-20 RX ADMIN — CHLORDIAZEPOXIDE HYDROCHLORIDE 10 MG: 10 CAPSULE ORAL at 08:42

## 2023-08-20 RX ADMIN — QUETIAPINE FUMARATE 400 MG: 200 TABLET ORAL at 21:12

## 2023-08-20 RX ADMIN — ESCITALOPRAM OXALATE 10 MG: 10 TABLET ORAL at 08:41

## 2023-08-20 NOTE — GROUP NOTE
Group Therapy Note    Date: 8/20/2023    Group Start Time: 7504  Group End Time: 1100  Group Topic: Community Meeting    [facility] [address], LPN        Group Therapy Note    Attendees: 4       Patient's Goal:  TALK TO THE DOCTOR. Notes:  ATTENTIVE    Status After Intervention:  Improved    Participation Level:  Active Listener    Participation Quality: Appropriate and Attentive      Speech:  normal      Thought Process/Content: Logical      Affective Functioning: Congruent      Mood:  CALM      Level of consciousness:  Alert      Response to Learning: Able to verbalize current knowledge/experience      Endings: None Reported    Modes of Intervention: Support      Discipline Responsible: Be[employer]      Signature:  Serge Devi LPN

## 2023-08-20 NOTE — GROUP NOTE
Group Therapy Note    Date: 8/19/2023    Group Start Time: 2000  Group End Time: 2045  Group Topic: Wrap-Up    [facility] [address], CNA        Group Therapy Note    Attendees: 3       Patient's Goal:  none    Notes:  none    Status After Intervention:  Unchanged    Participation Level: Active Listener    Participation Quality: Appropriate      Speech:  normal      Thought Process/Content: Logical      Affective Functioning: Congruent      Mood: anxious and depressed      Level of consciousness:  Alert and Oriented x4      Response to Learning: Able to verbalize current knowledge/experience, Able to verbalize/acknowledge new learning, Able to retain information, Capable of insight, Able to change behavior, and Progressing to goal      Endings: None Reported    Modes of Intervention: Activity      Discipline Responsible: [employer]      Signature:   Lexie Dinero CNA

## 2023-08-20 NOTE — CARE COORDINATION
08/19/23 2330   ITP   Date of Plan 08/19/23   Date of Next Review 08/26/23   Primary Diagnosis Code Schizoaffective disorder   Barriers to Treatment Need for psychoeducation;Psychiatric symptom (comment); Other (comment)   [employer] in [address] [place] [address] need for assistance;Community supports; Natural supports;Postive outlook   Plan of Care   Long Term Goal (LTG) Stated in patient/guardian terms Pt reportedly wants rehab and crisis stabilization. Short Term Goal 1   Short Term Goal 1 Client will learn and demonstrate effective coping skills   Baseline Functioning Unknown   Objectives Client will participate in individual therapy;Client will participate in group therapy   Intervention 1 Acknowledge client strengths   Frequency Weekly   Measured by Behavioral data; Self report;Staff observation   Staff Responsible Clinical staff   Intervention 2 Group therapy   Frequency Weekly   Measured by Behavioral data; Self report;Staff observation   Staff Responsible [facility] staff;Clinical staff   Intervention 3 Referral to community services   Measured by Self report;Behavioral data   Staff Responsible Clinical staff   STG Goal 1 Status: Patient Appears to be  Treatment plan goal is unmet at this time   Short Term Goal 2   Short Term Goal 2 Client will maintain compliance with medication regime   Objectives Other (comment)  (medication management)   Frequency Daily   Measured by Behavioral data;Staff observation   [facility]   Intervention 2 Monitor medications   Frequency Daily   STG Goal 2 Status: Patient Appears to be  Progressing toward treatment plan goal   Crisis/Safety/Discharge Plan   Discharge Plan Inpatient rehab

## 2023-08-21 PROCEDURE — 1240000000 HC EMOTIONAL WELLNESS R&B

## 2023-08-21 PROCEDURE — 6370000000 HC RX 637 (ALT 250 FOR IP): Performed by: PSYCHIATRY & NEUROLOGY

## 2023-08-21 RX ORDER — CHLORDIAZEPOXIDE HYDROCHLORIDE 10 MG/1
10 CAPSULE, GELATIN COATED ORAL 2 TIMES DAILY
Status: DISCONTINUED | OUTPATIENT
Start: 2023-08-22 | End: 2023-08-23 | Stop reason: HOSPADM

## 2023-08-21 RX ADMIN — ESCITALOPRAM OXALATE 10 MG: 10 TABLET ORAL at 09:02

## 2023-08-21 RX ADMIN — ALUMINUM HYDROXIDE, MAGNESIUM HYDROXIDE, AND SIMETHICONE 30 ML: 200; 200; 20 SUSPENSION ORAL at 20:44

## 2023-08-21 RX ADMIN — QUETIAPINE FUMARATE 400 MG: 200 TABLET ORAL at 20:44

## 2023-08-21 RX ADMIN — CHLORDIAZEPOXIDE HYDROCHLORIDE 10 MG: 10 CAPSULE ORAL at 14:06

## 2023-08-21 RX ADMIN — TRAZODONE HYDROCHLORIDE 150 MG: 100 TABLET ORAL at 20:45

## 2023-08-21 RX ADMIN — CHLORDIAZEPOXIDE HYDROCHLORIDE 10 MG: 10 CAPSULE ORAL at 09:02

## 2023-08-21 RX ADMIN — ALUMINUM HYDROXIDE, MAGNESIUM HYDROXIDE, AND SIMETHICONE 30 ML: 200; 200; 20 SUSPENSION ORAL at 15:51

## 2023-08-21 ASSESSMENT — PAIN SCALES - GENERAL: PAINLEVEL_OUTOF10: 0

## 2023-08-21 NOTE — GROUP NOTE
Group Therapy Note    Date: 8/21/2023    Group Start Time: 1100  Group End Time: 2988  Group Topic: Education Group - Inpatient    [facility] [address], LPN        Group Therapy Note    Attendees: 4       Patient's Goal:  TO GET DISCHARGED. Notes:  COPING SKILLS    Status After Intervention:  Improved    Participation Level:  Active Listener    Participation Quality: Appropriate and Attentive      Speech:  normal      Thought Process/Content: Logical      Affective Functioning: Congruent      Mood:  CALM      Level of consciousness:  Alert      Response to Learning: Able to verbalize current knowledge/experience      Endings: None Reported    Modes of Intervention: Education      Discipline Responsible: [address]      Signature:  Kate Dunn LPN

## 2023-08-21 NOTE — GROUP NOTE
Group Therapy Note    Date: 8/21/2023    Group Start Time: 2939  Group End Time: 1100  Group Topic: Community Meeting    [facility] [address], LPN        Group Therapy Note    Attendees: 5       Patient's Goal:  TO GET DISCHARGED. Notes:  ATTENTIVE    Status After Intervention:  Improved    Participation Level:  Active Listener    Participation Quality: Appropriate and Attentive      Speech:  normal      Thought Process/Content: Logical      Affective Functioning: Congruent      Mood:  CALM      Level of consciousness:  Alert      Response to Learning: Able to verbalize current knowledge/experience      Endings: None Reported    Modes of Intervention: Support      Discipline Responsible: Be[employer] Tech      Signature:  Michael Joe LPN

## 2023-08-21 NOTE — GROUP NOTE
Group Therapy Note    Date: 8/20/2023    Group Start Time: 2000  Group End Time: 2045  Group Topic: Wrap-Up    [facility] [address], CNA        Group Therapy Note    Attendees: 3       Patient's Goal:  To Maintain Her Anger    Notes:  participated in group    Status After Intervention:  Improved    Participation Level: Active Listener and Interactive    Participation Quality: Appropriate      Speech:  normal      Thought Process/Content: Logical      Affective Functioning: Congruent      Mood: anxious and depressed      Level of consciousness:  Alert and Oriented x4      Response to Learning: Able to verbalize current knowledge/experience, Able to verbalize/acknowledge new learning, Able to retain information, Capable of insight, Able to change behavior, and Progressing to goal      Endings: None Reported    Modes of Intervention: Activity      Discipline Responsible: Behavorial Health Tech      Signature:   Terryann Apley, CNA

## 2023-08-21 NOTE — PROGRESS NOTES
B: Pt is out of room in Group Room for breakfast. Denies suicidal and homicidal thoughts. Irritable with staff and peers, states they trigger irritable mood in him. Eating and sleeping well, compliant with meds. Attends some groups. Has been in room most of the morning, safe on unit. I: Maintain a safe environment for pt, safety cks q 15 mins and prn. Give meds as ordered, encourage groups. R: Pt is fairly cooperative with assessment, but does not engage in much conversation. Affect is blunt and mood is irritable. Safe on unit. P: Continue to monitor, give meds as ordered, encourage groups.

## 2023-08-21 NOTE — GROUP NOTE
Group Therapy Note    Date: 8/21/2023    Group Start Time: 4601  Group End Time: 6072  Group Topic: Education Group - Inpatient     [address]        Group Therapy Note    Attendees: Writer facilitated an inpatient psych-ed group. Writer provided a handout to discuss the concept of perfectionism versus striving for excellence. Writer provided examples and encouraged patients to process and discuss examples. Writer concluded session with encouraging patients to ask questions and/or provide input and feedback regarding the group. Patient's Goal:  To attend and participate in groups and activities daily. Notes:  Pt actively participated. Pt was able to discuss times she has been a perfectionist for herself and how it has caused procrastination. Status After Intervention:  Improved    Participation Level:  Active Listener and Interactive    Participation Quality: Appropriate, Attentive, and Sharing      Speech:  normal      Thought Process/Content: Logical  Linear      Affective Functioning: Congruent      Mood: euthymic      Level of consciousness:  Alert, Oriented x4, and Attentive      Response to Learning: Able to verbalize current knowledge/experience, Able to verbalize/acknowledge new learning, Capable of insight, and Progressing to goal      Endings: None Reported    Modes of Intervention: Education      Discipline Responsible: /Counselor      Signature:  Bryon Jha [facility]

## 2023-08-21 NOTE — GROUP NOTE
Group Therapy Note    Date: 2023    Group Start Time: 34  Group End Time: 1100  Group Topic: Community Meeting    [facility] [id number], LPN        Group Therapy Note    Attendees: 3         Patient's Goal:  ***    Notes:  ***    Status After Intervention:  {Status After Intervention:417960008}    Participation Level: {Participation Level:505230433}    Participation Quality: {[facility] PARTICIPATION QUALITY:601879416}      Speech:  {[facility] CD_SPEECH:84162}      Thought Process/Content: {Thought Process/Content:171357061}      Affective Functioning: {Affective Functionin}      Mood: {Mood:934061927}      Level of consciousness:  {Level of consciousness:148241621}      Response to Learnin [facility] Responses to Learnin}      Endings: {[facility] Endings:10881}    Modes of Intervention: {[facility] Modes of Intervention:840560614}      Discipline Responsible: [facility] Multidisciplinary:[id number]}      Signature:  Shyla Connor LPN

## 2023-08-22 PROCEDURE — 6370000000 HC RX 637 (ALT 250 FOR IP): Performed by: PSYCHIATRY & NEUROLOGY

## 2023-08-22 PROCEDURE — 1240000000 HC EMOTIONAL WELLNESS R&B

## 2023-08-22 RX ADMIN — CHLORDIAZEPOXIDE HYDROCHLORIDE 10 MG: 10 CAPSULE ORAL at 21:20

## 2023-08-22 RX ADMIN — QUETIAPINE FUMARATE 400 MG: 200 TABLET ORAL at 21:20

## 2023-08-22 RX ADMIN — ESCITALOPRAM OXALATE 10 MG: 10 TABLET ORAL at 09:01

## 2023-08-22 RX ADMIN — TRAZODONE HYDROCHLORIDE 150 MG: 100 TABLET ORAL at 21:21

## 2023-08-22 ASSESSMENT — PAIN SCALES - GENERAL: PAINLEVEL_OUTOF10: 0

## 2023-08-22 NOTE — GROUP NOTE
Group Therapy Note    Date: 8/22/2023    Group Start Time: 0900  Group End Time: 3687  Group Topic: Community Meeting    [facility] [place]        Group Therapy Note    Attendees: 4       Patient's Goal:  n/a    Notes:      Status After Intervention:      Participation Level:     Participation Quality:       Speech:         Thought Process/Content:       Affective Functioning:       Mood:       Level of consciousness:        Response to Learning:       Endings:     Modes of Intervention:       Discipline Responsible:       Signature:  Katarina Keenan

## 2023-08-22 NOTE — PROGRESS NOTES
B: Pt is awake and alert, states he is okay. Denies suicidal and homicidal thoughts, says her depression and anxiety is better. Hoping that [facility] will call today with an open bed. Continues to have irritable mood at times, refused librium this AM. Eating and sleeping well. Encouraged to attend groups and work on coping skills. I: Maintain a safe environment for pt. Safety cks q 15 mins and prn. Give meds as ordered, encourage groups. R: Pt is fairly cooperative with assessment, states he wants to leave today. Encouraged to attend groups and work on coping skills. P: Continue to monitor, give meds as ordered, encourage groups.

## 2023-08-22 NOTE — PROGRESS NOTES
Pt has been out and about on unit today, excited about discharge to rehab tomorrow. Safe on unit will continue to monitor.

## 2023-08-22 NOTE — GROUP NOTE
Group Therapy Note    Date: 8/22/2023    Group Start Time: 7813  Group End Time: 1500  Group Topic: Process Group - Inpatient     [address]        Group Therapy Note    Attendees: 5/5    Writer facilitated an inpatient process group. Writer encouraged patients to process any feelings they may be having, discuss discharge plans, etc. Writer held the space as patients processed and had them engage in various mindfulness expressive arts activities. Writer concluded session with a grounding exercise and encouraging patients to provide input and feedback regarding the group. Patient's Goal:  To attend and participate in groups and activities daily. Notes:  Pt actively participated. Pt was able to discuss his feelings regarding going to rehab tomorrow and engaged in mindfulness. Status After Intervention:  Improved    Participation Level:  Active Listener and Interactive    Participation Quality: Appropriate, Attentive, and Sharing      Speech:  normal      Thought Process/Content: Logical  Linear      Affective Functioning: Congruent      Mood: euthymic      Level of consciousness:  Alert, Oriented x4, and Attentive      Response to Learning: Able to verbalize/acknowledge new learning, Able to retain information, Capable of insight, and Progressing to goal      Endings: None Reported    Modes of Intervention: Exploration and Activity      Discipline Responsible: /Counselor      Signature:  Jennifer Ham, [address]

## 2023-08-22 NOTE — GROUP NOTE
Group Therapy Note    Date: 8/22/2023    Group Start Time: 0809  Group End Time: 2958  Group Topic: Psychoeducation     [address]        Group Therapy Note    Attendees: 5/5    Writer facilitated an inpatient psych-ed group. Writer provided a handout titled \"Building New Habits. Therapeutic purpose of the psych-ed discussion was to have patients discuss ways to build new habits after discharge. Writer held the space as patients shared. Writer concluded session by encouraging patients to ask questions. Patient's Goal:  To attend and participate in groups and activities daily. Notes:  Pt actively participated. Pt was able to recognize habits he needs to change to avoid relapse. Status After Intervention:  Improved    Participation Level:  Active Listener and Interactive    Participation Quality: Appropriate, Attentive, Sharing, and Supportive      Speech:  normal      Thought Process/Content: Logical  Linear      Affective Functioning: Congruent      Mood: euthymic      Level of consciousness:  Alert, Oriented x4, and Attentive      Response to Learning: Able to verbalize current knowledge/experience, Able to verbalize/acknowledge new learning, Able to retain information, Capable of insight, and Progressing to goal      Endings: None Reported    Modes of Intervention: Education      Discipline Responsible: /Counselor      [facility]  [facility]

## 2023-08-22 NOTE — GROUP NOTE
Group Therapy Note    Date: 8/21/2023    Group Start Time: 2000  Group End Time: 2045  Group Topic: Wrap-Up    [facility]    Orion Wolf        Group Therapy Note    Attendees: 1         Patient's Goal:  to be ready for the next step    Notes:  happy    Status After Intervention:  Improved    Participation Level:  Active Listener    Participation Quality: Supportive      Speech:  normal      Thought Process/Content: Logical      Affective Functioning: Congruent      Mood:  happy      Level of consciousness:  Alert      Response to Learning: Able to verbalize current knowledge/experience      Endings: None Reported    Modes of Intervention: Socialization      Discipline Responsible: [employer]      Signature:  Orion Wolf

## 2023-08-23 VITALS
BODY MASS INDEX: 36.49 KG/M2 | HEIGHT: 65 IN | WEIGHT: 219 LBS | OXYGEN SATURATION: 98 % | HEART RATE: 74 BPM | DIASTOLIC BLOOD PRESSURE: 50 MMHG | SYSTOLIC BLOOD PRESSURE: 95 MMHG | TEMPERATURE: 97.8 F | RESPIRATION RATE: 16 BRPM

## 2023-08-23 PROCEDURE — 6370000000 HC RX 637 (ALT 250 FOR IP): Performed by: PSYCHIATRY & NEUROLOGY

## 2023-08-23 RX ORDER — ESCITALOPRAM OXALATE 10 MG/1
20 TABLET ORAL DAILY
Qty: 30 TABLET | Refills: 3 | Status: SHIPPED | OUTPATIENT
Start: 2023-08-23

## 2023-08-23 RX ORDER — HYDROXYZINE 50 MG/1
50 TABLET, FILM COATED ORAL 3 TIMES DAILY PRN
Qty: 30 TABLET | Refills: 0 | Status: SHIPPED | OUTPATIENT
Start: 2023-08-23 | End: 2023-09-02

## 2023-08-23 RX ORDER — M-VIT,TX,IRON,MINS/CALC/FOLIC 27MG-0.4MG
1 TABLET ORAL DAILY
Qty: 30 TABLET | Refills: 0 | Status: SHIPPED | OUTPATIENT
Start: 2023-08-23

## 2023-08-23 RX ORDER — FOLIC ACID 1 MG/1
1 TABLET ORAL DAILY
Qty: 30 TABLET | Refills: 3 | Status: SHIPPED | OUTPATIENT
Start: 2023-08-23

## 2023-08-23 RX ORDER — LANOLIN ALCOHOL/MO/W.PET/CERES
100 CREAM (GRAM) TOPICAL DAILY
Qty: 30 TABLET | Refills: 3 | Status: SHIPPED | OUTPATIENT
Start: 2023-08-23

## 2023-08-23 RX ORDER — TRAZODONE HYDROCHLORIDE 150 MG/1
150 TABLET ORAL NIGHTLY
Qty: 30 TABLET | Refills: 0 | Status: SHIPPED | OUTPATIENT
Start: 2023-08-23

## 2023-08-23 RX ADMIN — ESCITALOPRAM OXALATE 10 MG: 10 TABLET ORAL at 07:55

## 2023-08-23 NOTE — PLAN OF CARE
New Admission
Problem: Discharge Planning  Goal: Discharge to home or other facility with appropriate resources  Outcome: Progressing     Problem: Self Harm/Suicidality  Goal: Will have no self-injury during hospital stay  Description: INTERVENTIONS:  1. Ensure constant observer at bedside with Q15M safety checks  2. Maintain a safe environment  3. Secure patient belongings  4. Ensure family/visitors adhere to safety recommendations  5. Ensure safety tray has been added to patient's diet order  6. Every shift and PRN: Re-assess suicidal risk via Frequent Screener    Outcome: Progressing     Problem: Depression  Goal: Will be euthymic at discharge  Description: INTERVENTIONS:  1. Administer medication as ordered  2. Provide emotional support via 1:1 interaction with staff  3. Encourage involvement in milieu/groups/activities  4. Monitor for social isolation  8/18/2023 2230 by Carmelo Moore RN  Outcome: Progressing  8/18/2023 1810 by Cordell Lewis LPN  Outcome: Progressing     Problem: Psychosis  Goal: Will report no hallucinations or delusions  Description: INTERVENTIONS:  1. Administer medication as  ordered  2. Assist with reality testing to support increasing orientation  3. Assess if patient's hallucinations or delusions are encouraging self harm or harm to others and intervene as appropriate  8/18/2023 2230 by Carmelo Moore RN  Outcome: Progressing  8/18/2023 1810 by Cordell Lewis LPN  Outcome: Progressing     Problem: Drug Abuse/Detox  Goal: Will have no detox symptoms and will verbalize plan for changing drug-related behavior  Description: INTERVENTIONS:  1. Administer medication as ordered  2. Monitor physical status  3. Provide emotional support with 1:1 interaction with staff  4. Encourage  recovery focused treatment   Outcome: Progressing     Problem: Sleep Disturbance  Goal: Will exhibit normal sleeping pattern  Description: INTERVENTIONS:  1. Administer medication as ordered  2.  Decrease environmental
Problem: Discharge Planning  Goal: Discharge to home or other facility with appropriate resources  Outcome: Progressing     Problem: Self Harm/Suicidality  Goal: Will have no self-injury during hospital stay  Description: INTERVENTIONS:  1. Ensure constant observer at bedside with Q15M safety checks  2. Maintain a safe environment  3. Secure patient belongings  4. Ensure family/visitors adhere to safety recommendations  5. Ensure safety tray has been added to patient's diet order  6. Every shift and PRN: Re-assess suicidal risk via Frequent Screener    Outcome: Progressing     Problem: Depression  Goal: Will be euthymic at discharge  Description: INTERVENTIONS:  1. Administer medication as ordered  2. Provide emotional support via 1:1 interaction with staff  3. Encourage involvement in milieu/groups/activities  4. Monitor for social isolation  Outcome: Progressing     Problem: Psychosis  Goal: Will report no hallucinations or delusions  Description: INTERVENTIONS:  1. Administer medication as  ordered  2. Assist with reality testing to support increasing orientation  3. Assess if patient's hallucinations or delusions are encouraging self harm or harm to others and intervene as appropriate  Outcome: Progressing     Problem: Drug Abuse/Detox  Goal: Will have no detox symptoms and will verbalize plan for changing drug-related behavior  Description: INTERVENTIONS:  1. Administer medication as ordered  2. Monitor physical status  3. Provide emotional support with 1:1 interaction with staff  4. Encourage  recovery focused treatment   Outcome: Progressing     Problem: Sleep Disturbance  Goal: Will exhibit normal sleeping pattern  Description: INTERVENTIONS:  1. Administer medication as ordered  2. Decrease environmental stimuli, including noise, as appropriate  3.  Discourage social isolation and naps during the day  Outcome: Progressing     Problem: Spiritual Care  Goal: Pt/Family able to move forward in process of
Problem: Discharge Planning  Goal: Discharge to home or other facility with appropriate resources  Outcome: Progressing     Problem: Self Harm/Suicidality  Goal: Will have no self-injury during hospital stay  Description: INTERVENTIONS:  1. Ensure constant observer at bedside with Q15M safety checks  2. Maintain a safe environment  3. Secure patient belongings  4. Ensure family/visitors adhere to safety recommendations  5. Ensure safety tray has been added to patient's diet order  6. Every shift and PRN: Re-assess suicidal risk via Frequent Screener    Outcome: Progressing     Problem: Depression  Goal: Will be euthymic at discharge  Description: INTERVENTIONS:  1. Administer medication as ordered  2. Provide emotional support via 1:1 interaction with staff  3. Encourage involvement in milieu/groups/activities  4. Monitor for social isolation  Outcome: Progressing     Problem: Psychosis  Goal: Will report no hallucinations or delusions  Description: INTERVENTIONS:  1. Administer medication as  ordered  2. Assist with reality testing to support increasing orientation  3. Assess if patient's hallucinations or delusions are encouraging self harm or harm to others and intervene as appropriate  Outcome: Progressing     Problem: Drug Abuse/Detox  Goal: Will have no detox symptoms and will verbalize plan for changing drug-related behavior  Description: INTERVENTIONS:  1. Administer medication as ordered  2. Monitor physical status  3. Provide emotional support with 1:1 interaction with staff  4. Encourage  recovery focused treatment   Outcome: Progressing     Problem: Sleep Disturbance  Goal: Will exhibit normal sleeping pattern  Description: INTERVENTIONS:  1. Administer medication as ordered  2. Decrease environmental stimuli, including noise, as appropriate  3.  Discourage social isolation and naps during the day  Outcome: Progressing     Problem: Spiritual Care  Goal: Pt/Family able to move forward in process of
Problem: Discharge Planning  Goal: Discharge to home or other facility with appropriate resources  Outcome: Progressing     Problem: Self Harm/Suicidality  Goal: Will have no self-injury during hospital stay  Description: INTERVENTIONS:  1. Ensure constant observer at bedside with Q15M safety checks  2. Maintain a safe environment  3. Secure patient belongings  4. Ensure family/visitors adhere to safety recommendations  5. Ensure safety tray has been added to patient's diet order  6. Every shift and PRN: Re-assess suicidal risk via Frequent Screener    Outcome: Progressing     Problem: Depression  Goal: Will be euthymic at discharge  Description: INTERVENTIONS:  1. Administer medication as ordered  2. Provide emotional support via 1:1 interaction with staff  3. Encourage involvement in milieu/groups/activities  4. Monitor for social isolation  Outcome: Progressing     Problem: Psychosis  Goal: Will report no hallucinations or delusions  Description: INTERVENTIONS:  1. Administer medication as  ordered  2. Assist with reality testing to support increasing orientation  3. Assess if patient's hallucinations or delusions are encouraging self harm or harm to others and intervene as appropriate  Outcome: Progressing     Problem: Sleep Disturbance  Goal: Will exhibit normal sleeping pattern  Description: INTERVENTIONS:  1. Administer medication as ordered  2. Decrease environmental stimuli, including noise, as appropriate  3.  Discourage social isolation and naps during the day  Outcome: Progressing
forgiving self, others, and/or higher power  Description: INTERVENTIONS:  1. Assist patient/family to overcome blocks to healing by use of spiritual practices (prayer, meditation, guided imagery, reiki, breath work, etc). 2. De-myth guilt and help patient/family identify possible irrational spiritual/cultural beliefs and values. 3. Explore possibilities of making amends & reconciliation with self, others, and/or a greater power. 4. Guide patient/family in identifying painful feelings of guilt. 5. Help patient/famiy explore and identify spiritual beliefs, cultural understandings or values that may help or hinder letting go of issue. 6. Help patient/family explore feelings of anger, bitterness, resentment. 7. Help patient/family identify and examine the situation in which these feelings are experienced. 8. Help patient/family identify destructive displacement of feelings onto other individuals. 9. Invite use of sacraments/rituals/ceremonies as appropriate (e.g. - confession, anointing, smudging). 10. Refer patient/family to formal counseling and/or to [facility] for further support work. Outcome: Progressing     Problem: Behavior  Goal: Pt/Family maintain appropriate behavior and adhere to behavioral management agreement, if implemented  Description: INTERVENTIONS:  1. Assess patient/family's coping skills and  non-compliant behavior (including use of illegal substances)  2. Notify security of behavior or suspected illegal substances which indicate the need for search of the family and/or belongings  3. Encourage verbalization of thoughts and concerns in a socially appropriate manner  4. Utilize positive, consistent limit setting strategies supporting safety of patient, staff and others  5. Encourage participation in the decision making process about the behavioral management agreement  6. If a visitor's behavior poses a threat to safety call refer to organization policy.   7. Initiate consult with

## 2023-08-23 NOTE — DISCHARGE INSTR - DIET

## 2023-08-23 NOTE — BH NOTE
At approximately  1800 hrs DAX Crowe  was  in the process of cleaning the Day Room. Patient Nathalia Kim was explaining the he felt unsafe because of the previous incident with  Patient  Les Bleeliz. Patient Sameer Gaines came  into the  Day room and  inturrupted the conversation saying \"If you say one more thing Fu--Dell thing about me or my family I am going to  F___K you up\"  He was speaking directly to Mr. Nathalia Kim. I DAX White had  step between them as Les Bless was increasingly aggressive. Within a few minutes . Nathalia Kim asked if he could speak to me. He stated he felt unsafe and wanted to be locked in   his room for  the  night  because he was afraid Les Bless would enter his room during the night and cause him harm.  This matter was immediately reported to Charge Nurse Hali Vigil,
B:   Patient alert and oriented x 4. Pt. States depression 5/10. Pt. States Anxiety is 3/10. Pt. admits to Hallucinations. That they come and go at times and that she is able to control them. Pt. denies Delusions. Pt. denies SI.  Pt. reports HI thoughts if someone triggers her but wouldn't do anything here. Pt. cooperative with Assessment. Pt.'s behavior Labile, Guarded/Suspicious, but Cooperative. CIWA=1       I:    If patient is disoriented, reorient pt. Build trust with patient, by therapeutic listening and Groups. Encourage pt. To attend and Participate in Groups. Provide Medications as ordered and needed. Encourage pt. To be up for all meals and snacks, and consume all of each. Encourage pt. To interact with staff and peers in a positive manner. Encourage pt. To keep good hygiene. Q 15 minute safety checks. R:   Pt. did not attend and Participate in Group. Pt. Is Compliant with Medications   Yes. Pt. is getting up for meals and snacks. Pt. Consumes 75% of Meals. Pt. is, interacting with Peers. Pt.'s hygiene is Good. Pt. does not, have any safety issues. P:   Pt. Will develop and continue to utilize positive Coping skills. Pt. Will continue to comply with Plan of Care toward Discharge. Pt. Will continue to stay safe on the unit.      0600: Pt slept throughout the night with no distress noted while conducting rounds.
B:   Patient alert and oriented x 4. Pt. States depression is yes. Pt. States Anxiety is yes. Pt. admits to Hallucinations. That they come and go at times and that she is able to control them. Pt. denies Delusions. Pt. denies SI.  Pt. reports HI thoughts if someone triggers her but wouldn't discuss. Pt. cooperative with Assessment. Pt.'s behavior Anxious, Guarded/Suspicious, Cooperative, and Pleasant. Pt express she doesn't have withdrawal symptoms from ETOH. CIWA=1      I:    If patient is disoriented, reorient pt. Build trust with patient, by therapeutic listening and Groups. Encourage pt. To attend and Participate in Groups. Provide Medications as ordered and needed. Encourage pt. To be up for all meals and snacks, and consume all of each. Encourage pt. To interact with staff and peers in a positive manner. Encourage pt. To keep good hygiene. Q 15 minute safety checks. R:   Pt. did attend and Participate in Group. Pt. Is Compliant with Medications   Yes. Pt. is getting up for meals and snacks. Pt. Consumes 75% of Meals. Pt. is, interacting with Peers. Pt.'s hygiene is Good. Pt. does not, have any safety issues. P:   Pt. Will develop and continue to utilize positive Coping skills. Pt. Will continue to comply with Plan of Care toward Discharge. Pt. Will continue to stay safe on the unit.     0600: Pt slept throughout the night with no distress noted while conducting rounds.
B:   Received awake in room resting quietly. Reports sleeping good last night. Patient alert and oriented x 4. Pt. States depression is 5. Pt. States Anxiety is 5. Pt. admits to Auditory  Hallucinations. Reports they come and go and not bad. Pt. denies Delusions. Pt. denies SI.  Pt. admits to HI due to people triggering her. Pt. cooperative with Assessment. Pt.'s behavior Guarded/Suspicious, Cooperative, and Pleasant. Concerned about being back on home medications. Reports being Paranoid, seeing animals and humans in graves. States goal for today is \"to work on myself while in the hospital\". Appears to be more open to conversation today than yesterday. I:    If patient is disoriented, reorient pt. Build trust with patient, by therapeutic listening and Groups. Encourage pt. To attend and Participate in Groups. Provide Medications as ordered and needed. Encourage pt. To be up for all meals and snacks, and consume all of each. Encourage pt. To interact with staff and peers in a positive manner. Encourage pt. To keep good hygiene. Q 15 minute safety checks. R:   Pt. did attend and Participate in Group. Pt. Is Compliant with Medications   Yes. Pt. is getting up for meals and snacks. Pt. Consumes 100% of Meals. Pt. is, interacting with Peers. Pt.'s hygiene is Good. Pt. does not, have any safety issues. P:   Pt. Will develop and continue to utilize positive Coping skills. Pt. Will continue to comply with Plan of Care toward Discharge. Pt. Will continue to stay safe on the unit.
B:   Received awake lying in bed. Reports sleeping good last night. Patient alert and oriented x 4. Pt. States depression is 2. Pt. States Anxiety is 5. Pt. admits to Hallucinations and states \"they are at a minimal\". Pt. denies Delusions. Pt. denies SI.  Pt. denies HI. Pt. cooperative with Assessment. Pt.'s behavior Cooperative and Pleasant. Reports she has triggers, but don't elaborate on what they are. Reports she feels safe here and feel there are no triggers here. Ready to talk to the  on Monday to start her plan when she goes home. I:    If patient is disoriented, reorient pt. Build trust with patient, by therapeutic listening and Groups. Encourage pt. To attend and Participate in Groups. Provide Medications as ordered and needed. Encourage pt. To be up for all meals and snacks, and consume all of each. Encourage pt. To interact with staff and peers in a positive manner. Encourage pt. To keep good hygiene. Q 15 minute safety checks. R:   Pt. did attend and Participate in Group. Pt. Is Compliant with Medications   Yes. Pt. is getting up for meals and snacks. Pt. Consumes 100% of Meals. Pt. is, interacting with Peers. Pt.'s hygiene is Good. Pt. does not, have any safety issues. P:   Pt. Will develop and continue to utilize positive Coping skills. Pt. Will continue to comply with Plan of Care toward Discharge. Pt. Will continue to stay safe on the unit.
B:   Received lying quietly in bed appearing to be sleep. Easily to aroused for med's and assessments. Refused all Vitamins. Took Lexapro and Librium without problems. Reports resting well during the night. Patient alert and oriented x 4. Pt. States depression is 4. Pt. States Anxiety is 4. Pt. admits to Auditory Hallucinations, but they come and go  Pt. denies Delusions. Pt. denies SI.  Pt. denies HI. Pt. cooperative with Assessment. Pt.'s behavior Anxious, Guarded/Suspicious, and Cooperative. States goal for today I to use Coping skills appropriately and try to stay calm without getting upset. I:    If patient is disoriented, reorient pt. Build trust with patient, by therapeutic listening and Groups. Encourage pt. To attend and Participate in Groups. Provide Medications as ordered and needed. Encourage pt. To be up for all meals and snacks, and consume all of each. Encourage pt. To interact with staff and peers in a positive manner. Encourage pt. To keep good hygiene. Q 15 minute safety checks. R:   Pt. did not attend AM group due to stating she was tired and wanted to rest.  States she will be at the rest of them. Pt. Is Compliant with some Medications   Yes  Does not want to take Vitamins Pt. is getting up for meals and snacks. Pt. Consumes 100% of Meals. Pt. is, interacting with Peers. Pt.'s hygiene is Fair. Pt. does not, have any safety issues. P:   Pt. Will develop and continue to utilize positive Coping skills. Pt. Will continue to comply with Plan of Care toward Discharge. Pt. Will continue to stay safe on the unit.
B:  Patient alert and oriented x 4. Pt. States depression 6/10. Pt. States Anxiety is 3/10. Pt. admits to Hallucinations and feels staff make want to hurt her. Explain staff is here only to help. Pt. denies Delusions. Pt. denies SI.  Pt. Denies HI  Pt. cooperative with Assessment. Pt.'s behavior  Cooperative and Pleasant. Pt expressed she decided to go to Rehab. CIWA=0       I:    If patient is disoriented, reorient pt. Build trust with patient, by therapeutic listening and Groups. Encourage pt. To attend and Participate in Groups. Provide Medications as ordered and needed. Encourage pt. To be up for all meals and snacks, and consume all of each. Encourage pt. To interact with staff and peers in a positive manner. Encourage pt. To keep good hygiene. Q 15 minute safety checks. R:   Pt. did attend and Participate in Group. Pt. Is Compliant with Medications   Yes. Pt had Maalox for indigestion. Pt. is getting up for meals and snacks. Pt. Consumes 75% of Meals. Pt. is, interacting with Peers. Pt.'s hygiene is Good. Pt. does not, have any safety issues. P:   Pt. Will develop and continue to utilize positive Coping skills. Pt. Will continue to comply with Plan of Care toward Discharge. Pt. Will continue to stay safe on the unit.      0600: Pt slept throughout the night with no distress noted while conducting rounds. Refused vitals.
B:  Patient alert and oriented x 4. Pt. States depression 6/10. Pt. States Anxiety is 3/10. Pt. admits to Hallucinations and feels staff make want to hurt her. Explain staff is here only to help. Pt. denies Delusions. Pt. denies SI.  Pt. Denies HI  Pt. cooperative with Assessment. Pt.'s behavior Labile, Guarded/Suspicious, but Cooperative. CIWA=0       I:    If patient is disoriented, reorient pt. Build trust with patient, by therapeutic listening and Groups. Encourage pt. To attend and Participate in Groups. Provide Medications as ordered and needed. Encourage pt. To be up for all meals and snacks, and consume all of each. Encourage pt. To interact with staff and peers in a positive manner. Encourage pt. To keep good hygiene. Q 15 minute safety checks. R:   Pt. did attend and Participate in Group. Pt. Is Compliant with Medications   Yes. Pt. is getting up for meals and snacks. Pt. Consumes 75% of Meals. Pt. is, interacting with Peers. Pt.'s hygiene is Good. Pt. does not, have any safety issues. P:   Pt. Will develop and continue to utilize positive Coping skills. Pt. Will continue to comply with Plan of Care toward Discharge. Pt. Will continue to stay safe on the unit.      0600: Pt slept throughout the night with no distress noted while conducting rounds.
B: Pt entered dayroom posturing and verbalizing obscene comments to 73 y/o male client, who was seated at the table. Pt shoved notebook on the floor. Pt begin pacing up and down the hallway, making threatening comments to both staff and clients. I: Attempted several times to redirect patient. Pt states, \"Just leave me alone; I want to leave now, y'all triggering me . Attempted several times to de-escalate the situation. R: A telephone order was received from Dr. Lexis Shelton for [address] [place] [address]. B52 was given by mouth @8639. P: Will continue to monitor for q 15 minutes. Checks for safety. Will continue to observe pt for any behavioral problems.
Behavioral Health Treatment Team Note     Patient goal(s) for today: Pt reports \"Get ready for tomorrow. \"  Treatment team focus/goals: medication management, safe discharge planning, attend groups and activities daily    Progress note: Pt observed walking the unit. Pt pleasant and alert and oriented x4. Pt denies SI, HI, AVH. Pt states he is looking forward to discharge tomorrow and has been trying to call family to let them know plans for inpatient rehab. Inpatient level of care is needed in order to stabilize pt further and to coordinate bed to bed to rehab facility. Pt engaged in safety planning with this writer.      LOS:  5  Expected LOS: D/C tomorrow    Insurance info/prescription coverage:  [place] Medicaid  Date of last family contact:  Pt has not signed release   Family requesting physician contact today:  No  Discharge plan:  Inpatient rehab  Guns in the home:  No  Outpatient provider(s):  [facility]    Participating treatment team members: Spencer Carmona, [facility]
Behavioral Health Treatment Team Note     Patient goal(s) for today: Pt reports \"Stay out of hospital. Work on mental illness. \"  Treatment team focus/goals: medication management, safe discharge planning, attend groups and activities daily    Progress note: Pt observed lying in bed. Pt alert and oriented x4. Pt states that he has been doing a lot of thinking and states that he would like to go to inpatient rehab and address the relapse. Pt states he would like to go to [facility]. Pt denies SI, HI, AVH. Inpatient level of care is needed in order to stabilize pt further on medications and to coordinate bed to bed to reduce risk of relapse. Pt states that if he does not have a bed by mid-week he would like to discharge to [address] in [place] and wait on rehab at a later time. Patient signed treatment plan. [place] Medicaid cab: [phone]     Address: [address] [place] [address],[address]. [address]    LOS:  4  Expected LOS: 7-8 days    Insurance info/prescription coverage:  [place] Medicaid  Date of last family contact:  Pt has not signed release   Family requesting physician contact today:  No  Discharge plan:  Inpatient rehab  Guns in the home:  No  Outpatient provider(s):   To be determined prior to discharge     Participating treatment team members: Anuja Verde [facility]
DISCHARGE SUMMARY    Efrem Hood  : 1990  MRN: [id number]    The patient Olga Rich exhibits the ability to control behavior in a less restrictive environment. Patient's level of functioning is improving. No assaultive/destructive behavior has been observed for the past 24 hours. No suicidal/homicidal threat or behavior has been observed for the past 24 hours. There is no evidence of serious medication side effects. Patient has not been in physical or protective restraints for at least the past 24 hours. If weapons involved, how are they secured? N/A    Is patient aware of and in agreement with discharge plan? Yes    Arrangements for medication:  Prescriptions On file at [facility] information     Copy of discharge instructions to provider?:  Yes    Arrangements for transportation home:  Pt will be taken to [facility] via [place] cab for inpatient substance use treatment. Keep all follow up appointments as scheduled, continue to take prescribed medications per physician instructions.   Mental health crisis number:  076 or your local mental health crisis line number at [address] Emergency WARM LINE      3-527-807-MH (6452)      M-F: 9am to 9pm      Sat & Sun: [address] [place] [address] suicide prevention lines:                             9-505-EEJPABG ([phone])       [phone]-TALK ([phone])    Crisis Text Line:  Text HOME to 445259
Discharge instructions given and explained. States understanding. Belongings returned and signed for. Denies SI/HI. Denies pain. D/C via cab to [facility].
[facility] ADMISSION NOTE  Pt. Arrived on the unit at approx:1345  escorted by Security and  ambulance staff via Via stretcher. From [place]      Pt. Awake. Patient arrived via stretcher from [facility] with a diagnosis of Schizoaffective Disorder, Depression with SI. Patient has a flat affect. Patient states that she drinks daily but patient scores 0 on her CIWA's, VSS BP:115/62/97.3-73-18. No signs or symptoms of DT's noted at this time. Notified Dr. Justin Jimenez and orders received to start patient on Librium 10mg PO TID and continue CIWA's every 4 hours. Patient uses cocaine and THC at least twice a week with yesterday being the last time she used. Patient stated that she was very tired and just wanted to go to bed. States she is living in a hotel now but is basically homeless. Patient is a smoker and refused the Nicotine patch when offered. Patient states that her mother and aunt are her main supporters but does not want anyone to know she is here at this time. Patient was given education on smoking cessation and talked some about Rehab for substance abuse. Patient stated that she was admitted due to SI and Depression. States she came to the ER because she knew she needed help. Patient is a female but wants to be called Q and used the pronouns of the male gender. At this time patient contracts for safety. Will continue to monitor every 15 minutes for safety. Patient has a history of cutting several years ago.        Admission Type:   Admission Type: Voluntary    Reason for admission:   Reason for Admission: Dperession/SI and HI ideations      Addictive Behavior:   Addictive Behavior  In the Past 3 Months, Have You Felt or Has Someone Told You That You Have a Problem With  :  (Denies)      Medical Problems:   Past Medical History:   Diagnosis Date    Schizoaffective disorder ([address])          Psych History:  Yes Patient was persuaded to leave AMA from [facility] and went to [facility] due to SI.
LPN's assessment reviewed
LPN's assessment reviewed
Medicaid cab scheduled for 08/23 at 8:00 am.      [facility]  [address]    Trip ID: [id number]    [place] Medicaid cab phone: [phone]
On Saturday 8/19/2023 at approximately 1400 hrs WYATT Lathamdelfin Bustamante heard loud noises coming from the Day Room. Upon entering the Day Room there was a note on the floor and Patient Balbina Walls \"C\" was exiting the Day Room  cursing and headed toward his patient room. I asked what was going on and the reply was  Leave me the F---K alone. After which an attempt was made to deescalate the behavior but Q continued to act out. I returned to the Day room and asked the patients seated what happened. They replied that  Q came into the Day Room  sneering   and looking angry  at Mr. No Villafuertecher the notebook on the table  Before leaving the DR ANDREE threw the notebook on the floor. Nurse Ronn Madera and WYATT Herman attempted to deescalate the patient, but was met with a barrage of verbal assaults. Security and Nurse Leader Faizan, Supervisor was noticed and responded to the area. Further details noted in Nurse [facility] Desmond's report.
PSYCHOSOCIAL ASSESSMENT  :Patient identifying info:   Saud Archibald is a 26 y.o., adult admitted 8/17/2023  1:40 PM   Pt sleeping at time writer attempted PSA. Writer will gather additional information at later time. Information gathered from nursing staff and other notes. Presenting problem and precipitating factors: Pt was admitted voluntarily due to increase in SI and worsening of mood symptoms. Pt has significant hx of schizoaffective disorder. Pt reported cocaine use and alcohol use. Pt reported hearing voices. Pt recently signed out AMA at [facility]. Mental status assessment: Unable to assess at this time    Strengths/Recreation/Coping Skills: Unknown at this time    Collateral information: Unknown at this time    Current psychiatric /substance abuse providers and contact info: Unknown at this time    Previous psychiatric/substance abuse providers and response to treatment: Pt recently hospitalized at [facility]    Family history of mental illness or substance abuse: Pt's mother has schizophrenia, aunt bipolar. Substance abuse history:    Social History     Tobacco Use    Smoking status: Every Day     Packs/day: 1.50     Years: 15.00     Pack years: 22.50     Types: Cigarettes     Passive exposure: Current    Smokeless tobacco: Never    Tobacco comments:     Offered Nicotine Patch    Substance Use Topics    Alcohol use: Yes     Comment: States he drinks daily       History of biomedical complications associated with substance abuse: None reported. Patient's current acceptance of treatment or motivation for change: Per report of nursing staff, pt reports he wants rehab but then states possible crisis stabilization. Pt appears to be going back and forth with what he wants out of treatment. Family constellation: Pt has six children    Is significant other involved?  and      Describe support system: None reported.     Describe living arrangements and home environment: Pt
Patient wrote on group paper that she is having thoughts of hurting people that trigger her and she knows how but wasn't going to tell  anyone but said she wasn't going to do it here at  this facility . I inform nurse Radames Padron and she went in to talk to her and she said she was n't going to do anything and she felt safe here.
Pt discharged from [facility]. Pt. Belongings given back to pt., verified all there, signed for. Pt has changed into her personal clothing. Discharge instructions explained to pt. Including, Follow up appt. With [facility]. Medications called into [facility].  Pt. Denies SI/HI at time of discharge. Pt. is a Smoker. Smoking cessation education was provided, but refused. Pt. is a drinker. Alcohol Education was Provided. Discharge Paperwork and H & P, faxed to [facility]dy Choi, With success sheet. Pt. was not discharged on 2 or more Antipsychotics. Pt. Displayed no safety concerns at discharge. Pt. Escorted to front by staff for transportation by Medicaid cab, to home.
Pt on phone interview with [address] [facility].
Pt received beginning of shift in activity room watching TV. Pt alert and oriented x 4, denies SI/HI, denies A/V hallucination  pt attend group and ate snack, accepted all HS medication, no signs of alcohol withdrawals. Pt slept by 2130 and  slept over night remained sleeping as of this time   No violent no self harming behavior noticed or reported.
Pt refused 1400 dosage of librium. Pt refused 1500 vitals.
Spoke with admissions.  Pt is able to be admitted to [facility] on Wednesday 08/23 at 1:00 pm.     Address: [address] [place] [address], [address] [place] [address]    Prescriptions: Can be filled at admission or with the following pharmacy:    [address] [place] [address]  Phone: [phone]
TREATMENT TEAM COMPLETED     Attending meeting was as follows:  Patient, Antonio Krabbe, PA, Augustin Lin, RN Unit Manager, Antonieta Gudino,Licensed Clinical Therapist.       Meeting was conducted via [employer]      Daily Treatment Team consists of the following:     Pt. Cognitive status:  Alert and Oriented x 4. Pt. Attending Groups: Yes    Pt. Participating in groups:  Yes    Pt. Homicidal / Suicidal: Denies Si/HI ideations    Pt. Behaviors:  Patient has been accepted into [facility]. Patient will be leaving bed to bed either tomorrow or Wednesday. Patient is no longer on CIWA's and is being tapered off Librium. Librium will be decreased from 10mg PO TID to 10 mg po BID. Pt. Compliant with Medications:  Yes          New Medication orders:  Yes  Decreased Librium from 10mg po TID to 10mg PO BID. Discharge Plan:  Patient will be transferred to [facility] on tomorrow or Wednesday.
TREATMENT TEAM COMPLETED     Attending meeting was as follows:  Patient, Dr. Urvashi Matson, RN Unit Manager and Yury Sunshine, Leigh LPN. Meeting was conducted via in person      Daily Treatment Team consists of the following:     Pt. Cognitive status:  Alert and Oriented x 4. Pt. Attending Groups: Yes    Pt. Participating in groups:  Yes    Pt. Homicidal / Suicidal: has only thoughts with no plans to act on either SI or HI. Able to contract for safety. Pt. Behaviors: Today patient is very pleasant and cooperative. States she wants to go to rehab for alcohol and drug addictions. Patient at this time is not having any signs of withdrawals. Continues on CIWA's every 4 hours. Patient remains on Librium 10mg PO TID. Did not take medications last night but was agreeable to take all medications this am. Requesting to be placed back on her home medications. Pt. Compliant with Medications:  Yes          New Medication orders:  Yes  Seroquel 400mg PO QHS  Trazadone 150mg po qhs  Lexapro 10mg po qd    Discharge Plan:  Patient is homeless and will outside services set up for her prior to discharge unless she agrees to got t rehab.
Yes  Patient was offered medication to assist with smoking cessation at discharge? YES  Was education for smoking cessation added to the discharge instructions? YES    Alcohol/Substance Abuse Discharge Plan:   Does the patient have a history of substance/alcohol abuse and requires a referral for treatment? Yes  Patient referred to the following for substance/alcohol abuse treatment with an appointment? yes  Patient was offered medication to assist with alcohol cessation at discharge? yes   Was education for substance/alcohol abuse added to discharge instructions? Yes    Patient Transition Record Discussed with Patient and copy given to patient? Yes    Patient discharged to Rehab for Substance Abuse at [facility]. Patient has been given a hard copy of her discharge instructions and transition of care instructions. Copy faxed to  [facility] with confirmation.

## 2023-08-23 NOTE — GROUP NOTE
Group Therapy Note    Date: 8/22/2023    Group Start Time: 2000  Group End Time: 2045  Group Topic: Wrap-Up    [facility] [address], CNA        Group Therapy Note    Attendees: 4       Patient's Goal:  To prepare herself for the next stepin her life    Notes:  participated in goup    Status After Intervention:  Improved    Participation Level: Active Listener    Participation Quality: Appropriate and Attentive      Speech:  normal      Thought Process/Content: Logical      Affective Functioning: Congruent      Mood: anxious and depressed      Level of consciousness:  Alert and Oriented x4      Response to Learning: Able to verbalize current knowledge/experience, Able to verbalize/acknowledge new learning, Able to retain information, Capable of insight, Able to change behavior, and Progressing to goal      Endings: None Reported    Modes of Intervention: Activity      Discipline Responsible: ROAM Data      Signature:   Terryann Apley, CNA

## 2023-08-23 NOTE — DISCHARGE SUMMARY
PSYCHIATRIC DISCHARGE SUMMARY         IDENTIFICATION:    Patient Name  Emma Ragland   Date of Birth 1990   [facility] [id number]   Medical Record Number  [id number]      Age  26 y.o. PCP No primary care provider on file. Admit date:  8/17/2023    Discharge date: 8/23/2023   Room Number  104/01  @ [address] [place] [address]   Date of Service  8/23/2023            TYPE OF DISCHARGE: REGULAR               CONDITION AT DISCHARGE: {condition:[id number]}       PROVISIONAL & DISCHARGE DIAGNOSES:    Admission Diagnoses:   Schizoaffective disorder ([address]) [F25.9]    Discharge Diagnoses:             CC & HISTORY OF PRESENT ILLNESS:  ***       HOSPITALIZATION COURSE:    Emma Ragland was admitted to the inpatient psychiatric unit [address] [place] [address] for acute psychiatric stabilization in regards to symptomatology as described in the HPI above. The differential diagnosis at time of admission included: ***schizophrenia vs substance induced psychotic disorder ***schizoaffective vs bipolar ***MDD vs adjustment disorder. While on the unit Emma Ragland was involved in individual, group, occupational and milieu therapy. Psychiatric medications were adjusted during this hospitalization. Emma Ragland demonstrated a progressive improvement in overall condition. Much of patient's initial presentation appeared to be related to situational stressors, effects of ***medication non-compliance ***drugs of abuse, ***and psychological factors. Please see individual progress notes for more specific details regarding patient's hospitalization course. The patient can safely be discharged today in a stable condition. There are no grounds to seek a TDO. At time of discharge, Emma Ragland is without significant problems of depression, psychosis, or sunday.  Patient free of suicidal and homicidal ideations and appears to be low risk of a low, chronic, elevated risk ***of suicide

## 2023-08-31 ENCOUNTER — HOSPITAL ENCOUNTER (EMERGENCY)
Facility: HOSPITAL | Age: 33
Discharge: HOME | End: 2023-09-01
Attending: EMERGENCY MEDICINE
Payer: MEDICAID

## 2023-08-31 DIAGNOSIS — R45.850 HOMICIDAL IDEATIONS: Primary | ICD-10-CM

## 2023-08-31 DIAGNOSIS — F25.9 SCHIZOAFFECTIVE DISORDER, UNSPECIFIED TYPE (HCC): ICD-10-CM

## 2023-08-31 LAB
ALBUMIN SERPL-MCNC: 3.3 G/DL (ref 3.4–5)
ALBUMIN/GLOB SERPL: 0.7 (ref 0.8–1.7)
ALP SERPL-CCNC: 97 U/L (ref 45–117)
ALT SERPL-CCNC: 96 U/L (ref 13–56)
AMPHET UR QL SCN: NEGATIVE
ANION GAP SERPL CALC-SCNC: 6 MMOL/L (ref 3–18)
AST SERPL-CCNC: 30 U/L (ref 10–38)
BARBITURATES UR QL SCN: NEGATIVE
BASOPHILS # BLD: 0 K/UL (ref 0–0.1)
BASOPHILS NFR BLD: 0 % (ref 0–2)
BENZODIAZ UR QL: NEGATIVE
BILIRUB SERPL-MCNC: 0.2 MG/DL (ref 0.2–1)
BUN SERPL-MCNC: 8 MG/DL (ref 7–18)
BUN/CREAT SERPL: 13 (ref 12–20)
CALCIUM SERPL-MCNC: 8.5 MG/DL (ref 8.5–10.1)
CANNABINOIDS UR QL SCN: NEGATIVE
CHLORIDE SERPL-SCNC: 107 MMOL/L (ref 100–111)
CO2 SERPL-SCNC: 22 MMOL/L (ref 21–32)
COCAINE UR QL SCN: NEGATIVE
CREAT SERPL-MCNC: 0.63 MG/DL (ref 0.6–1.3)
DIFFERENTIAL METHOD BLD: ABNORMAL
EOSINOPHIL # BLD: 0.1 K/UL (ref 0–0.4)
EOSINOPHIL NFR BLD: 1 % (ref 0–5)
ERYTHROCYTE [DISTWIDTH] IN BLOOD BY AUTOMATED COUNT: 19.4 % (ref 11.6–14.5)
ETHANOL SERPL-MCNC: 275 MG/DL (ref 0–3)
ETHANOL SERPL-MCNC: 3 MG/DL (ref 0–3)
GLOBULIN SER CALC-MCNC: 4.8 G/DL (ref 2–4)
GLUCOSE SERPL-MCNC: 125 MG/DL (ref 74–99)
HCG UR QL: NEGATIVE
HCT VFR BLD AUTO: 27.9 % (ref 35–45)
HGB BLD-MCNC: 8.2 G/DL (ref 12–16)
IMM GRANULOCYTES # BLD AUTO: 0 K/UL (ref 0–0.04)
IMM GRANULOCYTES NFR BLD AUTO: 0 % (ref 0–0.5)
LYMPHOCYTES # BLD: 1.8 K/UL (ref 0.9–3.6)
LYMPHOCYTES NFR BLD: 18 % (ref 21–52)
Lab: NORMAL
MCH RBC QN AUTO: 21.9 PG (ref 24–34)
MCHC RBC AUTO-ENTMCNC: 29.4 G/DL (ref 31–37)
MCV RBC AUTO: 74.6 FL (ref 78–100)
METHADONE UR QL: NEGATIVE
MONOCYTES # BLD: 0.4 K/UL (ref 0.05–1.2)
MONOCYTES NFR BLD: 4 % (ref 3–10)
NEUTS SEG # BLD: 7.6 K/UL (ref 1.8–8)
NEUTS SEG NFR BLD: 77 % (ref 40–73)
NRBC # BLD: 0 K/UL (ref 0–0.01)
NRBC BLD-RTO: 0 PER 100 WBC
OPIATES UR QL: NEGATIVE
PCP UR QL: NEGATIVE
PLATELET # BLD AUTO: 443 K/UL (ref 135–420)
PMV BLD AUTO: 8.9 FL (ref 9.2–11.8)
POTASSIUM SERPL-SCNC: 3 MMOL/L (ref 3.5–5.5)
PROT SERPL-MCNC: 8.1 G/DL (ref 6.4–8.2)
RBC # BLD AUTO: 3.74 M/UL (ref 4.2–5.3)
SODIUM SERPL-SCNC: 135 MMOL/L (ref 136–145)
WBC # BLD AUTO: 9.9 K/UL (ref 4.6–13.2)

## 2023-08-31 PROCEDURE — 82077 ASSAY SPEC XCP UR&BREATH IA: CPT

## 2023-08-31 PROCEDURE — 81025 URINE PREGNANCY TEST: CPT

## 2023-08-31 PROCEDURE — 6370000000 HC RX 637 (ALT 250 FOR IP): Performed by: EMERGENCY MEDICINE

## 2023-08-31 PROCEDURE — 80307 DRUG TEST PRSMV CHEM ANLYZR: CPT

## 2023-08-31 PROCEDURE — 85025 COMPLETE CBC W/AUTO DIFF WBC: CPT

## 2023-08-31 PROCEDURE — 80053 COMPREHEN METABOLIC PANEL: CPT

## 2023-08-31 PROCEDURE — 2580000003 HC RX 258: Performed by: EMERGENCY MEDICINE

## 2023-08-31 PROCEDURE — 99285 EMERGENCY DEPT VISIT HI MDM: CPT

## 2023-08-31 PROCEDURE — 6360000002 HC RX W HCPCS: Performed by: EMERGENCY MEDICINE

## 2023-08-31 PROCEDURE — 96374 THER/PROPH/DIAG INJ IV PUSH: CPT

## 2023-08-31 RX ORDER — LORAZEPAM 1 MG/1
1 TABLET ORAL ONCE
Status: DISCONTINUED | OUTPATIENT
Start: 2023-08-31 | End: 2023-09-01 | Stop reason: HOSPADM

## 2023-08-31 RX ORDER — ONDANSETRON 2 MG/ML
4 INJECTION INTRAMUSCULAR; INTRAVENOUS
Status: COMPLETED | OUTPATIENT
Start: 2023-08-31 | End: 2023-08-31

## 2023-08-31 RX ORDER — ACETAMINOPHEN 325 MG/1
650 TABLET ORAL EVERY 4 HOURS PRN
Status: DISCONTINUED | OUTPATIENT
Start: 2023-08-31 | End: 2023-09-01 | Stop reason: HOSPADM

## 2023-08-31 RX ORDER — QUETIAPINE FUMARATE 400 MG/1
400 TABLET, FILM COATED ORAL NIGHTLY
Status: DISCONTINUED | OUTPATIENT
Start: 2023-08-31 | End: 2023-09-01 | Stop reason: HOSPADM

## 2023-08-31 RX ORDER — 0.9 % SODIUM CHLORIDE 0.9 %
1000 INTRAVENOUS SOLUTION INTRAVENOUS ONCE
Status: COMPLETED | OUTPATIENT
Start: 2023-08-31 | End: 2023-08-31

## 2023-08-31 RX ORDER — HYDROXYZINE HYDROCHLORIDE 25 MG/1
50 TABLET, FILM COATED ORAL 3 TIMES DAILY PRN
Status: DISCONTINUED | OUTPATIENT
Start: 2023-08-31 | End: 2023-09-01 | Stop reason: HOSPADM

## 2023-08-31 RX ORDER — TRAZODONE HYDROCHLORIDE 50 MG/1
150 TABLET ORAL NIGHTLY
Status: DISCONTINUED | OUTPATIENT
Start: 2023-08-31 | End: 2023-09-01 | Stop reason: HOSPADM

## 2023-08-31 RX ORDER — IBUPROFEN 400 MG/1
400 TABLET ORAL
Status: COMPLETED | OUTPATIENT
Start: 2023-08-31 | End: 2023-08-31

## 2023-08-31 RX ADMIN — TRAZODONE HYDROCHLORIDE 150 MG: 50 TABLET ORAL at 21:44

## 2023-08-31 RX ADMIN — ONDANSETRON 4 MG: 2 INJECTION INTRAMUSCULAR; INTRAVENOUS at 01:06

## 2023-08-31 RX ADMIN — IBUPROFEN 400 MG: 400 TABLET ORAL at 14:05

## 2023-08-31 RX ADMIN — QUETIAPINE FUMARATE 400 MG: 400 TABLET ORAL at 21:44

## 2023-08-31 RX ADMIN — SODIUM CHLORIDE 1000 ML: 900 INJECTION, SOLUTION INTRAVENOUS at 01:06
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
## 2023-08-31 NOTE — ED NOTES
Pt sleeping on stretcher at this time, observed regular chest rise and fall, NAD.      Rudolph Dubin, RN  08/31/23 8348

## 2023-08-31 NOTE — ED TRIAGE NOTES
BIBA d/t +HI ideations. Pt stating that she wants to hurt others. When questioned who specifically she wants to hurt, she states \"the voices in my head\". Pt insistent that she does not want to harm herself. Hx of schizophrenia. +ETOH and marijuana.

## 2023-08-31 NOTE — ED NOTES
Pt given heat pack per request for comfort. Pt laying on stretcher sucking thumb.      Jigar Philippe RN  08/31/23 9996

## 2023-08-31 NOTE — ED NOTES
Pt sitting on stretcher, becoming increasingly talkative and trying to stop anyone nearby to conversate.      Alessandro Randolph RN  08/31/23 6866

## 2023-08-31 NOTE — ED NOTES
Patient resting on right side in hallway bed. No distress noted at this time. Chest rise and fall symmetrical. Patient audibly snoring. Will continue to monitor for safety.       Shakila Mcginnis RN  08/31/23 8831

## 2023-09-01 VITALS
HEART RATE: 65 BPM | TEMPERATURE: 98.2 F | RESPIRATION RATE: 16 BRPM | OXYGEN SATURATION: 99 % | DIASTOLIC BLOOD PRESSURE: 79 MMHG | SYSTOLIC BLOOD PRESSURE: 128 MMHG

## 2023-09-01 PROCEDURE — 6370000000 HC RX 637 (ALT 250 FOR IP): Performed by: STUDENT IN AN ORGANIZED HEALTH CARE EDUCATION/TRAINING PROGRAM

## 2023-09-01 RX ORDER — POTASSIUM CHLORIDE 20 MEQ/1
40 TABLET, EXTENDED RELEASE ORAL ONCE
Status: COMPLETED | OUTPATIENT
Start: 2023-09-01 | End: 2023-09-01

## 2023-09-01 RX ADMIN — POTASSIUM CHLORIDE 40 MEQ: 1500 TABLET, EXTENDED RELEASE ORAL at 10:00

## 2023-09-01 ASSESSMENT — ENCOUNTER SYMPTOMS
BLOOD IN STOOL: 0
ABDOMINAL DISTENTION: 0
SHORTNESS OF BREATH: 0
DIARRHEA: 0
EYE PAIN: 0
FACIAL SWELLING: 0
BACK PAIN: 0
ABDOMINAL PAIN: 0
CHEST TIGHTNESS: 0
CONSTIPATION: 0

## 2023-09-01 NOTE — DISCHARGE INSTRUCTIONS
You were evaluated for homicidal ideations and schizoaffective disorder . Based on your work-up it was deemed that she was stable for discharge. Please follow-up with your primary care physician if you have any further concerns and go over your work-up. If you experience any chest pain, shortness of breath, worsening abdominal pain, vomiting blood, worsening headache, seizures, or any worsening of your symptoms please return to the emergency department immediately. If you have any pending results or any further questions please contact the emergency department at [phone].

## 2023-09-01 NOTE — ED NOTES
Pt woke up and became verbally aggressive with staff stating she wanted to leave and to give her stuff back. Pt informed that were she too leave we would have to call police to inform them of pts elopment as she is a danger to others. Pt went back to sleep.       Oneil Arango RN  09/01/23 5008

## 2023-09-01 NOTE — BSMART NOTE
Crisis Note: Crisis discussed with on-call Psychiatrist, Dr. Daphney Modi, regarding inpatient admission and was accepted to [facility]. Patient will be transferred to unit pending psychiatrist review of patient's chart, medication/admit orders being placed and a bed assignment. Will notify charge nurse and ED physician of disposition.

## 2023-09-01 NOTE — ED PROVIDER NOTES
ED Course as of 09/01/23 1110   Thu Aug 31, 2023   0612 ETOH, homicidal, needs ED crisis after clinically sober [JM]   Fri Sep 01, 2023   0914 Eb to ks 27 yo female hi/ etoh + when pt came in cc- plan needs raegan evaluation. [KS]   [id number] Update per crisis - replete potassium and then updates.   [KS]   1010 Pending bed search transfer to another hospital.  [KS]      ED Course User Index  [JM] Jace Syed DO  [KS] Alexus Jackson MD

## 2023-09-01 NOTE — PROGRESS NOTES
6:10 AM : Pt care transferred to me from Dr. Jay Mcwilliams  ,ED provider. History of patient complaint(s), available diagnostic reports and current treatment plan has been discussed thoroughly. Bedside rounding on patient occured : No  Intended disposition of patient : TBD  Pending diagnostics reports and/or labs (please list):   HI, ETOH. Needs repeat ETOH before crisis screen. 1730: ETOH <3. Discussed with crisis screener, Telma Mar. Pt signed out to Dr. Jay Mcwilliams at shift change. Naina Seymour DO  Emergency Physician  .S.  [employer]

## 2023-09-01 NOTE — ED NOTES
Pt pacing hallway, ignacioing cash stating she is ready to go. Pt demanding that she be discharged and requesting that she be shown the exit to the ER. Pt notified that crisis will be called for re-evaluation and patient states she is tired of waiting. Crisis called and awaiting return call.        Yuan Felix RN  09/01/23 1839

## 2023-09-01 NOTE — H&P
EMERGENCY DEPARTMENT HISTORY AND PHYSICAL EXAM    3:36 PM      Date: 8/31/2023  Patient Name: Sari Thomson    History of Presenting Illness     No chief complaint on file. History From: Patient  HPI  78-year-old female who presents with homicidal ideations. Patient states that she has plans of harming others. States that she can get access to a gun. Denies any suicidal, auditory, or hallucinations. When assessing ROS she denies any nausea, vomiting, chest pain, shortness of breath, or any other symptomatic changes. Nursing Notes were all reviewed and agreed with or any disagreements were addressed in the HPI. PCP: No primary care provider on file.     Current Facility-Administered Medications   Medication Dose Route Frequency Provider Last Rate Last Admin    acetaminophen (TYLENOL) tablet 650 mg  650 mg Oral Q4H PRN Vicky Espitia DO        hydrOXYzine HCl (ATARAX) tablet 50 mg  50 mg Oral TID PRN Master Ragsdale MD        traZODone (DESYREL) tablet 150 mg  150 mg Oral Nightly Master Ragsdale MD   150 mg at 08/31/23 2144    QUEtiapine (SEROQUEL) tablet 400 mg  400 mg Oral Nightly Master Ragsdale MD   400 mg at 08/31/23 2144    LORazepam (ATIVAN) tablet 1 mg  1 mg Oral Once Master Ragsdale MD         Current Outpatient Medications   Medication Sig Dispense Refill    hydrOXYzine HCl (ATARAX) 50 MG tablet Take 1 tablet by mouth 3 times daily as needed for Anxiety 30 tablet 0    escitalopram (LEXAPRO) 10 MG tablet Take 2 tablets by mouth daily 30 tablet 3    traZODone (DESYREL) 150 MG tablet Take 1 tablet by mouth nightly 30 tablet 0    folic acid (FOLVITE) 1 MG tablet Take 1 tablet by mouth daily 30 tablet 3    Multiple Vitamins-Minerals (THERAPEUTIC MULTIVITAMIN-MINERALS) tablet Take 1 tablet by mouth daily 30 tablet 0    thiamine 100 MG tablet Take 1 tablet by mouth daily 30 tablet 3    QUEtiapine (SEROQUEL) 400 MG tablet Take 1 tablet by mouth nightly 30 tablet 0       Past History     Past Medical

## 2023-09-01 NOTE — BSMART NOTE
Crisis Note: Patient stated that she would a bed at another hospital. \"I don't like this hospital. People are mean. \" Patient did not elaborate about \"people are mean. \" Patient verbalized that she is willing for voluntary admission at any accepting [address] [place] [address] facility; shortly afterwards, patient stated that she is not suicidal, nor homicidal. Patient denied hallucinations and does not exhibit psychotic behavior. Patient stated that she lives in [place] with her wife and she would like a bus ticket to get home. Discussed with on-call, psychiatrist, patient may be discharged with outpatient resources. Patient encouraged to call 911, or returned to nearest emergency room, if he need arises. Patient deischarged per ED.

## 2024-01-13 ENCOUNTER — HOSPITAL ENCOUNTER (EMERGENCY)
Facility: HOSPITAL | Age: 34
Discharge: HOME | End: 2024-01-13
Attending: STUDENT IN AN ORGANIZED HEALTH CARE EDUCATION/TRAINING PROGRAM
Payer: MEDICAID

## 2024-01-13 VITALS
BODY MASS INDEX: 35.35 KG/M2 | HEIGHT: 66 IN | OXYGEN SATURATION: 98 % | DIASTOLIC BLOOD PRESSURE: 83 MMHG | SYSTOLIC BLOOD PRESSURE: 118 MMHG | HEART RATE: 77 BPM | RESPIRATION RATE: 18 BRPM | TEMPERATURE: 97.9 F

## 2024-01-13 DIAGNOSIS — N89.8 VAGINAL DISCHARGE: ICD-10-CM

## 2024-01-13 DIAGNOSIS — R07.9 CHEST PAIN, UNSPECIFIED TYPE: ICD-10-CM

## 2024-01-13 DIAGNOSIS — A59.9 TRICHOMONAS INFECTION: ICD-10-CM

## 2024-01-13 DIAGNOSIS — N76.0 BACTERIAL VAGINOSIS: Primary | ICD-10-CM

## 2024-01-13 DIAGNOSIS — B96.89 BACTERIAL VAGINOSIS: Primary | ICD-10-CM

## 2024-01-13 LAB
ALBUMIN SERPL-MCNC: 3.2 G/DL (ref 3.5–5)
ALBUMIN/GLOB SERPL: 0.6 (ref 1.1–2.2)
ALP SERPL-CCNC: 95 U/L (ref 45–117)
ALT SERPL-CCNC: 67 U/L (ref 12–78)
ANION GAP SERPL CALC-SCNC: 6 MMOL/L (ref 5–15)
APPEARANCE UR: CLEAR
AST SERPL-CCNC: 35 U/L (ref 15–37)
BACTERIA URNS QL MICRO: NEGATIVE /HPF
BASOPHILS # BLD: 0 K/UL (ref 0–0.1)
BASOPHILS NFR BLD: 1 % (ref 0–1)
BILIRUB SERPL-MCNC: 0.2 MG/DL (ref 0.2–1)
BILIRUB UR QL: NEGATIVE
BUN SERPL-MCNC: 9 MG/DL (ref 6–20)
BUN/CREAT SERPL: 12 (ref 12–20)
CALCIUM SERPL-MCNC: 8.7 MG/DL (ref 8.5–10.1)
CHLORIDE SERPL-SCNC: 112 MMOL/L (ref 97–108)
CLUE CELLS VAG QL WET PREP: NORMAL
CO2 SERPL-SCNC: 23 MMOL/L (ref 21–32)
COLOR UR: ABNORMAL
COMMENT:: NORMAL
CREAT SERPL-MCNC: 0.77 MG/DL (ref 0.55–1.02)
DIFFERENTIAL METHOD BLD: ABNORMAL
EOSINOPHIL # BLD: 0.1 K/UL (ref 0–0.4)
EOSINOPHIL NFR BLD: 2 % (ref 0–7)
EPITH CASTS URNS QL MICRO: ABNORMAL /LPF
ERYTHROCYTE [DISTWIDTH] IN BLOOD BY AUTOMATED COUNT: 18.2 % (ref 11.5–14.5)
GLOBULIN SER CALC-MCNC: 5.3 G/DL (ref 2–4)
GLUCOSE SERPL-MCNC: 85 MG/DL (ref 65–100)
GLUCOSE UR STRIP.AUTO-MCNC: NEGATIVE MG/DL
HCT VFR BLD AUTO: 31.2 % (ref 35–47)
HGB BLD-MCNC: 9.3 G/DL (ref 11.5–16)
HGB UR QL STRIP: NEGATIVE
IMM GRANULOCYTES # BLD AUTO: 0 K/UL (ref 0–0.04)
IMM GRANULOCYTES NFR BLD AUTO: 0 % (ref 0–0.5)
KETONES UR QL STRIP.AUTO: ABNORMAL MG/DL
KOH PREP SPEC: NORMAL
LEUKOCYTE ESTERASE UR QL STRIP.AUTO: ABNORMAL
LYMPHOCYTES # BLD: 1.6 K/UL (ref 0.8–3.5)
LYMPHOCYTES NFR BLD: 25 % (ref 12–49)
MCH RBC QN AUTO: 23.1 PG (ref 26–34)
MCHC RBC AUTO-ENTMCNC: 29.8 G/DL (ref 30–36.5)
MCV RBC AUTO: 77.6 FL (ref 80–99)
MONOCYTES # BLD: 0.4 K/UL (ref 0–1)
MONOCYTES NFR BLD: 7 % (ref 5–13)
NEUTS SEG # BLD: 4.3 K/UL (ref 1.8–8)
NEUTS SEG NFR BLD: 65 % (ref 32–75)
NITRITE UR QL STRIP.AUTO: NEGATIVE
NRBC # BLD: 0 K/UL (ref 0–0.01)
NRBC BLD-RTO: 0 PER 100 WBC
PH UR STRIP: 7 (ref 5–8)
PLATELET # BLD AUTO: 390 K/UL (ref 150–400)
PMV BLD AUTO: 9.4 FL (ref 8.9–12.9)
POTASSIUM SERPL-SCNC: 4.3 MMOL/L (ref 3.5–5.1)
PROT SERPL-MCNC: 8.5 G/DL (ref 6.4–8.2)
PROT UR STRIP-MCNC: 30 MG/DL
RBC # BLD AUTO: 4.02 M/UL (ref 3.8–5.2)
RBC #/AREA URNS HPF: ABNORMAL /HPF (ref 0–5)
SERVICE CMNT-IMP: NORMAL
SODIUM SERPL-SCNC: 141 MMOL/L (ref 136–145)
SP GR UR REFRACTOMETRY: 1.02 (ref 1–1.03)
SPECIMEN HOLD: NORMAL
T VAGINALIS VAG QL WET PREP: NORMAL
TROPONIN I SERPL HS-MCNC: <4 NG/L (ref 0–51)
URINE CULTURE IF INDICATED: ABNORMAL
UROBILINOGEN UR QL STRIP.AUTO: 1 EU/DL (ref 0.2–1)
WBC # BLD AUTO: 6.4 K/UL (ref 3.6–11)
WBC URNS QL MICRO: ABNORMAL /HPF (ref 0–4)

## 2024-01-13 PROCEDURE — 87210 SMEAR WET MOUNT SALINE/INK: CPT

## 2024-01-13 PROCEDURE — 81001 URINALYSIS AUTO W/SCOPE: CPT

## 2024-01-13 PROCEDURE — 99284 EMERGENCY DEPT VISIT MOD MDM: CPT

## 2024-01-13 PROCEDURE — 80053 COMPREHEN METABOLIC PANEL: CPT

## 2024-01-13 PROCEDURE — 85025 COMPLETE CBC W/AUTO DIFF WBC: CPT

## 2024-01-13 PROCEDURE — 87491 CHLMYD TRACH DNA AMP PROBE: CPT

## 2024-01-13 PROCEDURE — 36415 COLL VENOUS BLD VENIPUNCTURE: CPT

## 2024-01-13 PROCEDURE — 84484 ASSAY OF TROPONIN QUANT: CPT

## 2024-01-13 PROCEDURE — 87591 N.GONORRHOEAE DNA AMP PROB: CPT

## 2024-01-13 RX ORDER — METRONIDAZOLE 500 MG/1
500 TABLET ORAL 2 TIMES DAILY
Qty: 14 TABLET | Refills: 0 | Status: SHIPPED | OUTPATIENT
Start: 2024-01-13 | End: 2024-01-20

## 2024-01-13 ASSESSMENT — PAIN SCALES - GENERAL: PAINLEVEL_OUTOF10: 4

## 2024-01-13 ASSESSMENT — ENCOUNTER SYMPTOMS
SORE THROAT: 0
SHORTNESS OF BREATH: 0
NAUSEA: 0
RHINORRHEA: 0
DIARRHEA: 0
COUGH: 0
VOMITING: 0
ABDOMINAL PAIN: 0

## 2024-01-13 ASSESSMENT — PAIN DESCRIPTION - LOCATION: LOCATION: ABDOMEN;CHEST

## 2024-01-13 NOTE — CARE COORDINATION
ED Care Management - Received call from registration to request ambulatory transportation for patient. Registrar confirmed patient's address [address] [place] [address] and phone [phone].     set up Lyft transport via [employer] for 17:45.      MUSTAPHA NOBLE

## 2024-01-13 NOTE — ED PROVIDER NOTES
[facility] EMERGENCY DEP  EMERGENCY DEPARTMENT ENCOUNTER      Pt Name: Eleanor Galvan  MRN: [id number]  Birthdate 1990  Date of evaluation: 1/13/2024  Provider: Sandra Retana PA-C    CHIEF COMPLAINT       Chief Complaint   Patient presents with    Chest Pain         HISTORY OF PRESENT ILLNESS   (Location/Symptom, Timing/Onset, Context/Setting, Quality, Duration, Modifying Factors, Severity)  Note limiting factors.   Pt is a 34 yo M (transgender) with PMHx significant for schizoaffective disorder to the ED for intermittent nonradiating mid chest pain that lasts for a few minutes, described as a sharp sensation since this morning.  Denies chest pain currently.  Denies any exacerbating or relieving factors.  Pt also notes dysuria, vaginal itchiness, clear and fishy smelling vaginal discharge. Reports being sexually active with one partner. Denies nausea, vomiting, diarrhea, abdominal pain, fever, chills, shortness of breath, lightheadedness, dizziness, hematuria, urinary frequency.             Review of External Medical Records:     Nursing Notes were reviewed.    REVIEW OF SYSTEMS    (2-9 systems for level 4, 10 or more for level 5)     Review of Systems   Constitutional:  Negative for chills and fever.   HENT:  Negative for congestion, rhinorrhea and sore throat.    Respiratory:  Negative for cough and shortness of breath.    Cardiovascular:  Positive for chest pain.   Gastrointestinal:  Negative for abdominal pain, diarrhea, nausea and vomiting.   Genitourinary:  Positive for dysuria and vaginal discharge. Negative for frequency and hematuria.   Musculoskeletal:  Negative for myalgias.   Neurological:  Negative for dizziness, weakness and headaches.       Except as noted above the remainder of the review of systems was reviewed and negative.       PAST MEDICAL HISTORY     Past Medical History:   Diagnosis Date    Schizoaffective disorder (HCC)          SURGICAL HISTORY     No past surgical history on

## 2024-01-13 NOTE — DISCHARGE INSTRUCTIONS
Take metronidazole as directed  Notify your partner of trichomonas infection as they should be treated as well  Your GC/chlamydia culture is pending, you will receive a call if it is positive.   Drink plenty of fluids  Follow-up with PCP as needed  Discussed my clinical impression(s), any labs and/or radiology results with the patient. I answered any questions and addressed any concerns. Discussed the importance of following up with their primary care physician and/or specialist(s). Discussed signs or symptoms that would warrant return back to the ER for further evaluation. The patient is agreeable with discharge.

## 2024-01-13 NOTE — ED NOTES
Pt endorses dysuria, lower abd pain, and vaginal itchiness x 5 days and \"fishy smelling discharge\".     Pt denies N/V, fever/chills, back pain

## 2024-01-13 NOTE — ED NOTES
Pt anxious for a smoke break and to call a ride. Pt accepted discharge papers and stepped out prior to provider discussing results.

## 2024-01-14 LAB
C TRACH DNA SPEC QL NAA+PROBE: NEGATIVE
N GONORRHOEA DNA SPEC QL NAA+PROBE: NEGATIVE
SAMPLE TYPE: NORMAL
SERVICE CMNT-IMP: NORMAL
SPECIMEN SOURCE: NORMAL

## 2024-10-19 ENCOUNTER — HOSPITAL ENCOUNTER (INPATIENT)
Facility: HOSPITAL | Age: 34
LOS: 1 days | Discharge: LEFT BEFORE DISCHARGE | DRG: 750 | End: 2024-10-23
Attending: STUDENT IN AN ORGANIZED HEALTH CARE EDUCATION/TRAINING PROGRAM | Admitting: PSYCHIATRY & NEUROLOGY
Payer: MEDICAID

## 2024-10-19 DIAGNOSIS — D64.9 ANEMIA, UNSPECIFIED TYPE: ICD-10-CM

## 2024-10-19 DIAGNOSIS — R45.851 SUICIDAL IDEATION: Primary | ICD-10-CM

## 2024-10-19 DIAGNOSIS — Z91.148 H/O MEDICATION NONCOMPLIANCE: ICD-10-CM

## 2024-10-19 DIAGNOSIS — R45.850 HOMICIDAL IDEATION: ICD-10-CM

## 2024-10-19 LAB
ALBUMIN SERPL-MCNC: 3.1 G/DL (ref 3.4–5)
ALBUMIN/GLOB SERPL: 0.7 (ref 0.8–1.7)
ALP SERPL-CCNC: 89 U/L (ref 45–117)
ALT SERPL-CCNC: 23 U/L (ref 13–56)
ANION GAP SERPL CALC-SCNC: 4 MMOL/L (ref 3–18)
AST SERPL-CCNC: 11 U/L (ref 10–38)
BASOPHILS # BLD: 0 K/UL (ref 0–0.1)
BASOPHILS NFR BLD: 1 % (ref 0–2)
BILIRUB SERPL-MCNC: 0.2 MG/DL (ref 0.2–1)
BUN SERPL-MCNC: 12 MG/DL (ref 7–18)
BUN/CREAT SERPL: 16 (ref 12–20)
CALCIUM SERPL-MCNC: 9 MG/DL (ref 8.5–10.1)
CHLORIDE SERPL-SCNC: 111 MMOL/L (ref 100–111)
CO2 SERPL-SCNC: 26 MMOL/L (ref 21–32)
CREAT SERPL-MCNC: 0.76 MG/DL (ref 0.6–1.3)
DIFFERENTIAL METHOD BLD: ABNORMAL
EOSINOPHIL # BLD: 0.1 K/UL (ref 0–0.4)
EOSINOPHIL NFR BLD: 1 % (ref 0–5)
ERYTHROCYTE [DISTWIDTH] IN BLOOD BY AUTOMATED COUNT: 17.9 % (ref 11.6–14.5)
ETHANOL SERPL-MCNC: <3 MG/DL (ref 0–3)
GLOBULIN SER CALC-MCNC: 4.2 G/DL (ref 2–4)
GLUCOSE SERPL-MCNC: 100 MG/DL (ref 74–99)
HCG SERPL QL: NEGATIVE
HCT VFR BLD AUTO: 24.3 % (ref 35–45)
HGB BLD-MCNC: 6.9 G/DL (ref 12–16)
IMM GRANULOCYTES # BLD AUTO: 0 K/UL (ref 0–0.04)
IMM GRANULOCYTES NFR BLD AUTO: 1 % (ref 0–0.5)
LYMPHOCYTES # BLD: 2.7 K/UL (ref 0.9–3.6)
LYMPHOCYTES NFR BLD: 32 % (ref 21–52)
MCH RBC QN AUTO: 19.9 PG (ref 24–34)
MCHC RBC AUTO-ENTMCNC: 28.4 G/DL (ref 31–37)
MCV RBC AUTO: 70.2 FL (ref 78–100)
MONOCYTES # BLD: 0.8 K/UL (ref 0.05–1.2)
MONOCYTES NFR BLD: 10 % (ref 3–10)
NEUTS SEG # BLD: 4.9 K/UL (ref 1.8–8)
NEUTS SEG NFR BLD: 57 % (ref 40–73)
NRBC # BLD: 0 K/UL (ref 0–0.01)
NRBC BLD-RTO: 0 PER 100 WBC
PLATELET # BLD AUTO: 488 K/UL (ref 135–420)
PMV BLD AUTO: 8.2 FL (ref 9.2–11.8)
POTASSIUM SERPL-SCNC: 3.6 MMOL/L (ref 3.5–5.5)
PROT SERPL-MCNC: 7.3 G/DL (ref 6.4–8.2)
RBC # BLD AUTO: 3.46 M/UL (ref 4.2–5.3)
SODIUM SERPL-SCNC: 141 MMOL/L (ref 136–145)
WBC # BLD AUTO: 8.6 K/UL (ref 4.6–13.2)

## 2024-10-19 PROCEDURE — 84703 CHORIONIC GONADOTROPIN ASSAY: CPT

## 2024-10-19 PROCEDURE — 82077 ASSAY SPEC XCP UR&BREATH IA: CPT

## 2024-10-19 PROCEDURE — 99285 EMERGENCY DEPT VISIT HI MDM: CPT

## 2024-10-19 PROCEDURE — 80053 COMPREHEN METABOLIC PANEL: CPT

## 2024-10-19 PROCEDURE — 85025 COMPLETE CBC W/AUTO DIFF WBC: CPT

## 2024-10-19 RX ORDER — HALOPERIDOL 5 MG/ML
5 INJECTION INTRAMUSCULAR EVERY 6 HOURS PRN
Status: DISCONTINUED | OUTPATIENT
Start: 2024-10-19 | End: 2024-10-22

## 2024-10-19 RX ORDER — LORAZEPAM 1 MG/1
1 TABLET ORAL EVERY 6 HOURS PRN
Status: DISCONTINUED | OUTPATIENT
Start: 2024-10-19 | End: 2024-10-22

## 2024-10-19 RX ORDER — HALOPERIDOL 5 MG/1
5 TABLET ORAL EVERY 6 HOURS PRN
Status: DISCONTINUED | OUTPATIENT
Start: 2024-10-19 | End: 2024-10-22

## 2024-10-19 RX ORDER — LORAZEPAM 2 MG/ML
1 INJECTION INTRAMUSCULAR EVERY 6 HOURS
Status: DISCONTINUED | OUTPATIENT
Start: 2024-10-19 | End: 2024-10-22

## 2024-10-19 ASSESSMENT — PAIN SCALES - GENERAL
PAINLEVEL_OUTOF10: 0
PAINLEVEL_OUTOF10: 0

## 2024-10-19 NOTE — ED TRIAGE NOTES
Patient brought in by EMS via stretcher to Room 18. Patient complains of suicidal ideation and homicidal ideation. Patient requesting mental health resources. Non compliant with medications x 1 week. Medications Seroquel, trazodone, Prozac. Moderate developmental delay. 128/62, 100, 18

## 2024-10-20 LAB
AMPHET UR QL SCN: NEGATIVE
BARBITURATES UR QL SCN: NEGATIVE
BENZODIAZ UR QL: NEGATIVE
CANNABINOIDS UR QL SCN: POSITIVE
COCAINE UR QL SCN: POSITIVE
FENTANYL: NEGATIVE
HCT VFR BLD AUTO: 27 % (ref 35–45)
HGB BLD-MCNC: 7.5 G/DL (ref 12–16)
HISTORY CHECK: NORMAL
Lab: ABNORMAL
Lab: NORMAL
METHADONE UR QL: NEGATIVE
OPIATES UR QL: NEGATIVE
OXYCODONE UR QL SCN: NEGATIVE
PCP UR QL: NEGATIVE

## 2024-10-20 PROCEDURE — 86901 BLOOD TYPING SEROLOGIC RH(D): CPT

## 2024-10-20 PROCEDURE — 85018 HEMOGLOBIN: CPT

## 2024-10-20 PROCEDURE — 85014 HEMATOCRIT: CPT

## 2024-10-20 PROCEDURE — 86900 BLOOD TYPING SEROLOGIC ABO: CPT

## 2024-10-20 PROCEDURE — 80307 DRUG TEST PRSMV CHEM ANLYZR: CPT

## 2024-10-20 PROCEDURE — 94761 N-INVAS EAR/PLS OXIMETRY MLT: CPT

## 2024-10-20 PROCEDURE — 86923 COMPATIBILITY TEST ELECTRIC: CPT

## 2024-10-20 PROCEDURE — 86850 RBC ANTIBODY SCREEN: CPT

## 2024-10-20 RX ORDER — LORAZEPAM 2 MG/ML
2 INJECTION INTRAMUSCULAR ONCE
Status: DISCONTINUED | OUTPATIENT
Start: 2024-10-20 | End: 2024-10-20

## 2024-10-20 RX ORDER — LORAZEPAM 2 MG/ML
2 INJECTION INTRAMUSCULAR ONCE
Status: DISCONTINUED | OUTPATIENT
Start: 2024-10-20 | End: 2024-10-22

## 2024-10-20 RX ORDER — DIPHENHYDRAMINE HYDROCHLORIDE 50 MG/ML
50 INJECTION INTRAMUSCULAR; INTRAVENOUS
Status: ACTIVE | OUTPATIENT
Start: 2024-10-20 | End: 2024-10-21

## 2024-10-20 RX ORDER — SODIUM CHLORIDE 9 MG/ML
INJECTION, SOLUTION INTRAVENOUS PRN
Status: DISCONTINUED | OUTPATIENT
Start: 2024-10-20 | End: 2024-10-22

## 2024-10-20 RX ORDER — DIPHENHYDRAMINE HYDROCHLORIDE 50 MG/ML
50 INJECTION INTRAMUSCULAR; INTRAVENOUS
Status: DISCONTINUED | OUTPATIENT
Start: 2024-10-20 | End: 2024-10-20

## 2024-10-20 ASSESSMENT — PAIN SCALES - GENERAL
PAINLEVEL_OUTOF10: 0
PAINLEVEL_OUTOF10: 0

## 2024-10-20 NOTE — ED NOTES
Patient laying left side, blanket pulled up to shoulders. Skin is warm and dry to touch. No respiratory distress observed. Patient called me a liar when she asked for chips. Patient endorses I am withholding potato chips from here.

## 2024-10-20 NOTE — BSMART NOTE
Crisis Note: Discussed with the on-call Psychiatrist, Dr. Rhodes , regarding inpatient admission with the following clinical summary and was declined to [facility] due to patient hemoglobin is critically low (6.9). Spoke with Dr. Lowe who informed that patient does need a blood transfusion and expressed that the patient's hemogloblin baseline is between 8-10; however, the patient has been refusing lab/blood work. Dr. Lowe expressed seeking out an medical TDO; awaiting to hear back the findings.

## 2024-10-20 NOTE — ED NOTES
Asked patient if I could draw her blood for the blood that needed to be given to her. Patient endorses 'I don't want it'.

## 2024-10-20 NOTE — ED NOTES
Hgb 6.9. patient refuses blood and endorses she does not care if she bleeds out. Dr. Hartmann aware. LIBIA Siddiqi aware.

## 2024-10-20 NOTE — BLOOD REFUSAL
I have recommended that this patient have a Blood transfusion but he declines at this time. I have discussed the risks and benefits of this examination with him. The patient verbalizes understanding.

## 2024-10-20 NOTE — ED NOTES
Patient given peanut butter and jelly sandwich and turkey sandwich. Patient refused turkey sandwich and vanilla pudding. Requested frozen dinner. I explained to patient we are out of frozen dinners. Patient given 2 ginger ale's.

## 2024-10-20 NOTE — BSMART NOTE
No chief complaint on file.      Patient was evaluated by Tele-Psych who recommends inpatient psychiatric treatment for suicidal ideations without a plan  and homicidal ideations towards people without a plan    Patient is voluntary for inpatient treatment.    Past Medical History:   Diagnosis Date    Schizoaffective disorder (HCC)        Prior to Visit Medications    Medication Sig Taking? Authorizing Provider   escitalopram (LEXAPRO) 10 MG tablet Take 2 tablets by mouth daily  Dimas Kimball PA   traZODone (DESYREL) 150 MG tablet Take 1 tablet by mouth nightly  Dimas Kimball PA   folic acid (FOLVITE) 1 MG tablet Take 1 tablet by mouth daily  Dimas Kimball PA   Multiple Vitamins-Minerals (THERAPEUTIC MULTIVITAMIN-MINERALS) tablet Take 1 tablet by mouth daily  Dimas Kimball PA   thiamine 100 MG tablet Take 1 tablet by mouth daily  Dimas Kimball PA   QUEtiapine (SEROQUEL) 400 MG tablet Take 1 tablet by mouth nightly  Kory Wong MD         The following labs and vitals were reviewed with on-call psychiatrist.    BMP, CMP, CBC, ETOH, and HCG    There were no vitals filed for this visit.      Writer met with patient to assess the following    Ambulation - Patient reports ability to ambulate without difficulty or the use of any assistive devices.    ADLs - Patient able to perform own ADLs without assistance.    DME - Patient requires none.    Patient will be presented to on call provider when medically cleared.

## 2024-10-20 NOTE — BSMART NOTE
Crisis Note: Dr. Lowe informed that the  granted the medical TDO and will get the required labs and repeat regarding her hemoglobin. Will assist as needed.

## 2024-10-20 NOTE — ED NOTES
Patient wanded. Patient searched. Personal belongings gone through.     2 personal belonging bags placed in [address] [address] Top Shelf.

## 2024-10-20 NOTE — ED NOTES
Spoke with patient and explained that we are trying to get her a room and urine was needed. Patient said 'ok'.

## 2024-10-21 PROCEDURE — 94761 N-INVAS EAR/PLS OXIMETRY MLT: CPT

## 2024-10-21 PROCEDURE — 6370000000 HC RX 637 (ALT 250 FOR IP): Performed by: EMERGENCY MEDICINE

## 2024-10-21 RX ORDER — FERROUS SULFATE 325(65) MG
325 TABLET ORAL
COMMUNITY

## 2024-10-21 RX ORDER — HYDROXYZINE HYDROCHLORIDE 50 MG/1
50 TABLET, FILM COATED ORAL EVERY 6 HOURS PRN
COMMUNITY

## 2024-10-21 RX ADMIN — FLUOXETINE HYDROCHLORIDE 20 MG: 20 CAPSULE ORAL at 22:19

## 2024-10-21 RX ADMIN — TRAZODONE HYDROCHLORIDE 150 MG: 50 TABLET ORAL at 22:19

## 2024-10-21 RX ADMIN — QUETIAPINE FUMARATE 400 MG: 300 TABLET ORAL at 22:19

## 2024-10-21 NOTE — BSMART NOTE
1348   Call from [facility]   [No callback number listed]   Entered By: Bhumi Neal RN   Updated By: Bhumi Neal RN, Bhumi Neal RN   Pending:  [facility]  Crisis Stabilization        Capacity:  [facility]     Declined:  [place] - No beds in low stimulus area  [facility] - beh is exclusionary,                                                   recommending Medsurg bed  [facility] -aggression is exclusionary

## 2024-10-21 NOTE — BSMART NOTE
Crisis note:    Conduit bed search results thus far:    Pending:  [facility]  Crisis Stabilization  [facility]        Capacity:  [facility]  [facility]     Declined:  [place] - No beds in low stimulus area  [facility] - behaviors exclusionary,                                                   recommending Medsurg   [facility] -aggression is exclusionary

## 2024-10-21 NOTE — BSMART NOTE
Crisis Note: Discussed with the on-call [facility] provider, JENNIFER Retana, regarding inpatient admission with clinical summary. Patient declined to [facility] due to 7.5 hemoglobin. JENNIFER Retana reports patient requires a medical TDO. Writer informed JENNIFER Retana medical TDO has been obtained. Dr. Hartmann and charge nurse made aware.

## 2024-10-21 NOTE — ED NOTES
Pt refused vital signs, states, \"Why do you all keep fucking messing with me. Leave me alone\". Provider and Charge Nurse aware.

## 2024-10-21 NOTE — BSMART NOTE
Crisis Note: Writer spoke with patient regarding voluntary inpatient treatment twice this shift. Patient receptive to voluntary inpatient treatment. Writer asked patient to explain meaning of voluntary treatment. Patient stated \" It means I came here to get some help on my own.\"

## 2024-10-21 NOTE — BSMART NOTE
Crisis note:    Met with patient. Patient pleasant and cooperative. Patient is stating she does need psychiatric treatment. Stated \"I need to go somewhere else. I really would like to go to another state.\" Patient reports she is agreeable to voluntary inpatient treatment at any psychiatric facility in [place] but did state [facility] is the last choice. Patient did not give a reason for [facility] being her last choice.     Will consult with on call Psychiatrist Dr. Bullard regarding this patient.     1055: discussed with Dr. Bullard, is concerned about patient's medical issue with low H&H but ED has medically cleared. Currently no bed is available at [facility] and patient is requesting another psychiatric facility with [facility] being her last choice. Initiate a conduit bed search.    1122: contacted conduit and spoke to Aurelia, bed search initiated.

## 2024-10-21 NOTE — ED NOTES
Patient refusing Haldol, benadryl and ativan at this time.  States she does not wish to be medicated.

## 2024-10-21 NOTE — ED NOTES
I received the patient turnover from Dr. Lowe at the end of his shift.  The patient is a 34-year-old woman who presented with suicidal ideation.  She is under a TDO and is awaiting placement through [facility] at this time.     Neda Muse MD  10/21/24 1406

## 2024-10-21 NOTE — ED NOTES
Patient refusing blood draw. Patient refusing blood products at this time. Patient made aware of risks of low hemoglobin, states \"I do not want to get HIV from someone's blood\".

## 2024-10-22 PROCEDURE — 1240000000 HC EMOTIONAL WELLNESS R&B

## 2024-10-22 PROCEDURE — 94761 N-INVAS EAR/PLS OXIMETRY MLT: CPT

## 2024-10-22 PROCEDURE — 96372 THER/PROPH/DIAG INJ SC/IM: CPT

## 2024-10-22 PROCEDURE — 6370000000 HC RX 637 (ALT 250 FOR IP): Performed by: EMERGENCY MEDICINE

## 2024-10-22 PROCEDURE — 6370000000 HC RX 637 (ALT 250 FOR IP): Performed by: PSYCHIATRY & NEUROLOGY

## 2024-10-22 PROCEDURE — 6360000002 HC RX W HCPCS: Performed by: NURSE PRACTITIONER

## 2024-10-22 PROCEDURE — 93005 ELECTROCARDIOGRAM TRACING: CPT | Performed by: EMERGENCY MEDICINE

## 2024-10-22 RX ORDER — BENZTROPINE MESYLATE 1 MG/ML
1 INJECTION, SOLUTION INTRAMUSCULAR; INTRAVENOUS EVERY 6 HOURS PRN
Status: DISCONTINUED | OUTPATIENT
Start: 2024-10-22 | End: 2024-10-23 | Stop reason: HOSPADM

## 2024-10-22 RX ORDER — TRAZODONE HYDROCHLORIDE 100 MG/1
100 TABLET ORAL NIGHTLY
Status: DISCONTINUED | OUTPATIENT
Start: 2024-10-22 | End: 2024-10-23 | Stop reason: HOSPADM

## 2024-10-22 RX ORDER — HALOPERIDOL 5 MG/ML
5 INJECTION INTRAMUSCULAR EVERY 6 HOURS PRN
Status: DISCONTINUED | OUTPATIENT
Start: 2024-10-22 | End: 2024-10-23 | Stop reason: HOSPADM

## 2024-10-22 RX ORDER — MAGNESIUM HYDROXIDE/ALUMINUM HYDROXICE/SIMETHICONE 120; 1200; 1200 MG/30ML; MG/30ML; MG/30ML
30 SUSPENSION ORAL EVERY 6 HOURS PRN
Status: DISCONTINUED | OUTPATIENT
Start: 2024-10-22 | End: 2024-10-23 | Stop reason: HOSPADM

## 2024-10-22 RX ORDER — HYDROXYZINE HYDROCHLORIDE 50 MG/1
50 TABLET, FILM COATED ORAL 3 TIMES DAILY PRN
Status: DISCONTINUED | OUTPATIENT
Start: 2024-10-22 | End: 2024-10-23 | Stop reason: HOSPADM

## 2024-10-22 RX ORDER — BENZTROPINE MESYLATE 1 MG/1
1 TABLET ORAL EVERY 6 HOURS PRN
Status: DISCONTINUED | OUTPATIENT
Start: 2024-10-22 | End: 2024-10-23 | Stop reason: HOSPADM

## 2024-10-22 RX ORDER — POLYETHYLENE GLYCOL 3350 17 G/17G
17 POWDER, FOR SOLUTION ORAL DAILY PRN
Status: DISCONTINUED | OUTPATIENT
Start: 2024-10-22 | End: 2024-10-23 | Stop reason: HOSPADM

## 2024-10-22 RX ORDER — HALOPERIDOL 5 MG/ML
5 INJECTION INTRAMUSCULAR EVERY 6 HOURS PRN
Status: DISCONTINUED | OUTPATIENT
Start: 2024-10-22 | End: 2024-10-22

## 2024-10-22 RX ORDER — LANOLIN ALCOHOL/MO/W.PET/CERES
3 CREAM (GRAM) TOPICAL NIGHTLY PRN
Status: DISCONTINUED | OUTPATIENT
Start: 2024-10-22 | End: 2024-10-23 | Stop reason: HOSPADM

## 2024-10-22 RX ORDER — ACETAMINOPHEN 325 MG/1
650 TABLET ORAL EVERY 4 HOURS PRN
Status: DISCONTINUED | OUTPATIENT
Start: 2024-10-22 | End: 2024-10-23 | Stop reason: HOSPADM

## 2024-10-22 RX ORDER — HYDROXYZINE HYDROCHLORIDE 50 MG/1
25 TABLET, FILM COATED ORAL EVERY 6 HOURS PRN
Status: DISCONTINUED | OUTPATIENT
Start: 2024-10-22 | End: 2024-10-22

## 2024-10-22 RX ORDER — HALOPERIDOL 5 MG/1
5 TABLET ORAL EVERY 6 HOURS PRN
Status: DISCONTINUED | OUTPATIENT
Start: 2024-10-22 | End: 2024-10-23 | Stop reason: HOSPADM

## 2024-10-22 RX ORDER — QUETIAPINE FUMARATE 100 MG/1
100 TABLET, FILM COATED ORAL NIGHTLY
Status: DISCONTINUED | OUTPATIENT
Start: 2024-10-22 | End: 2024-10-23 | Stop reason: HOSPADM

## 2024-10-22 RX ADMIN — TRAZODONE HYDROCHLORIDE 100 MG: 100 TABLET ORAL at 21:06

## 2024-10-22 RX ADMIN — QUETIAPINE FUMARATE 100 MG: 100 TABLET ORAL at 21:07

## 2024-10-22 RX ADMIN — FLUOXETINE HYDROCHLORIDE 20 MG: 20 CAPSULE ORAL at 09:33

## 2024-10-22 RX ADMIN — LORAZEPAM 1 MG: 2 INJECTION INTRAMUSCULAR; INTRAVENOUS at 09:33

## 2024-10-22 ASSESSMENT — PAIN SCALES - GENERAL: PAINLEVEL_OUTOF10: 0

## 2024-10-22 ASSESSMENT — SLEEP AND FATIGUE QUESTIONNAIRES
SLEEP PATTERN: UNABLE TO ASSESS
DO YOU HAVE DIFFICULTY SLEEPING: COMMENT
AVERAGE NUMBER OF SLEEP HOURS: 8
DO YOU USE A SLEEP AID: YES

## 2024-10-22 ASSESSMENT — LIFESTYLE VARIABLES
HOW MANY STANDARD DRINKS CONTAINING ALCOHOL DO YOU HAVE ON A TYPICAL DAY: PATIENT DECLINED
HOW OFTEN DO YOU HAVE A DRINK CONTAINING ALCOHOL: PATIENT DECLINED

## 2024-10-22 NOTE — PROGRESS NOTES
Assumed care of patient at this time. Patient resting on stretcher with bed in lowest position. NAD noted nor voiced. Care ongoing. Sitter at bedside.

## 2024-10-22 NOTE — BSMART NOTE
0724   Call to [facility]   [No callback number listed]   Entered By: Radha Husain RN   Pending:  [place]        Capacity:  [facility]  [facility]     Declined:  [facility] - No beds in low stimulus area  [facility] -   behaviors exclusionary,    recommending Medsurg   [facility] -aggression is exclusionary  [facility]  Crisis Stabilization- requires a higher level of care than they can provide

## 2024-10-22 NOTE — ED PROVIDER NOTES
11:31 AM : Pt care assumed from Dr. Lima  ,ED provider. History of patient complaint(s), available diagnostic reports and current treatment plan has been discussed thoroughly.   Bedside rounding on patient occured : Yes  Medically cleared Yes  Status: Voluntary  Intended disposition of patient : likely admission  Pending diagnostics reports and/or labs (please list): pending placement via psychiatry team.    Recent Results (from the past 12 hour(s))   EKG 12 Lead (Abn HR)    Collection Time: 10/22/24  3:31 AM   Result Value Ref Range    Ventricular Rate 64 BPM    Atrial Rate 64 BPM    P-R Interval 152 ms    QRS Duration 86 ms    Q-T Interval 440 ms    QTc Calculation (Bazett) 453 ms    P Axis 10 degrees    R Axis 4 degrees    T Axis 34 degrees    Diagnosis       Normal sinus rhythm  Nonspecific T wave abnormality  Abnormal ECG  When compared with ECG of 04-DEC-2019 01:04,  Questionable change in QRS axis  Nonspecific T wave abnormality now evident in Inferior leads  Nonspecific T wave abnormality no longer evident in Lateral leads             ED Course as of 10/22/24 1131   Sat Oct 19, 2024   2110 Per psychiatry they will recommend inpatient admission  [KS]   2110 Haldol 5mg po or IM q6hrs as needed  [KS]   2214 Per telepsych note patient is involuntary admission to psych once medically cleared.  Crisis order placed so that they can initiate TDO [JR]   2216 Patient informed CBC shows hemoglobin 6.9.  Patient offered blood transfusion declines.  States she is has never had a blood transfusion before.  Patient denies any vomiting or blood in stools.  Denies any dizziness or fatigue.  States she feels fine.  [JR]   2244 Per crisis team patient states she is a voluntary admission at this point. [JR]   2314 Per RN patient refused urine.  Patient medically cleared for behavioral health evaluation. [JR]   Coleen Oct 20, 2024   0253 Jr to ks 34 to female to male cc- si and hi ( auditory hallucinations) / workup- refusing 
Dr. Lowe taking over patient care.  Patient pending crisis evaluation for disposition.    1:13 PM     Spoke with nara who states patient will require a blood transfusion and repeat blood work for placement for further psychiatric evaluation.  Attempted to obtain repeat hemoglobin however patient declining at this time.  Will attempt to obtain a medical TDO to obtain repeat lab work and provide patient with a blood transfusion.  Will continue to monitor patient.    2:20 PM  Spoke with , who agrees to [facility]essa medical TDO at this time and believes patient would benefit from blood transfusion.  Due to this we will provide patient with 1 unit PRBC, chemically restrain patient if necessary, obtain repeat hemoglobin following, and then reach back out to psychiatry as necessary for further treatment.  Will update crisis.    5:10 PM  Patient after much discussion by bedside nurse now agreeable to having new IV placed as well as repeat lab work.  Will continue to monitor patient.    6:40 AM  Patient's repeat hemoglobin 7.5.  Have updated crisis.  Will continue to attempt placement for psychiatric evaluation and treatment without blood transfusion at this time.  Continues to have normal vital signs without evidence of active bleeding at this time.  Believe patient would be appropriate for psychiatric treatment and evaluation without blood transfusion.  Patient medically cleared at this time.       Critical Care Time:  The services I provided to this patient were to treat and/or prevent clinically significant deterioration that could result in the failure of one or more body systems and/or organ systems due to Anemia, Psychiatric disorder    Services included the following:  -reviewing nursing notes and old charts  -vital sign assessments  -direct patient care  -medication orders and management  -interpreting and reviewing diagnostic studies/labs  -re-evaluations  -documentation time    Aggregate critical care time 
ED Course as of 10/20/24 1916   Sat Oct 19, 2024   2110 Per psychiatry they will recommend inpatient admission  [KS]   2110 Haldol 5mg po or IM q6hrs as needed  [KS]   2214 Per telepsych note patient is involuntary admission to psych once medically cleared.  Crisis order placed so that they can initiate TDO [JR]   2216 Patient informed CBC shows hemoglobin 6.9.  Patient offered blood transfusion declines.  States she is has never had a blood transfusion before.  Patient denies any vomiting or blood in stools.  Denies any dizziness or fatigue.  States she feels fine.  [JR]   2244 Per crisis team patient states she is a voluntary admission at this point. [JR]   2314 Per RN patient refused urine.  Patient medically cleared for behavioral health evaluation. [JR]   Coleen Oct 20, 2024   0253 Jr to ks 34 to female to male cc- si and hi ( auditory hallucinations) / workup- refusing blood ( h/h 6.9 / plan- pending ua/ crisis will look for bed for patient  [KS]   0314 Hemoglobin Quant(!): 6.9 [KS]   0316 Went to bedside.  Patient denies any hemoptysis, melena, hematemesis.  Says that she was suicidal, homicidal.  No clear etiology.  No auditory visual hallucination.  Discussed that patient is anemic with a hemoglobin of 6.9.  Patient is refusing blood transfusion at this time.  Understands risk and benefits.  Understands that she can.  Bleed to death if we do not find the etiology.  At this time patient is deferring treatment at this time. [KS]   1839 Dv to ks cc- suicidal / workup- hgb of 6.9 / plan- pending prbc and then psych will admit ( she is on medical tdo right now)  [KS]      ED Course User Index  [JR] Beverly Harding, APRN - NP  [KS] Catrachito Hartmann MD Sarpong, Keneth, MD  10/20/24 1916    
    Clinical Impression:   1. Suicidal ideation    2. Homicidal ideation    3. H/O medication noncompliance    4. Anemia, unspecified type        Disposition: TBD      No follow-up provider specified.        Medication List        ASK your doctor about these medications      escitalopram 10 MG tablet  Commonly known as: LEXAPRO  Take 2 tablets by mouth daily     folic acid 1 MG tablet  Commonly known as: FOLVITE  Take 1 tablet by mouth daily     QUEtiapine 400 MG tablet  Commonly known as: SEROQUEL  Take 1 tablet by mouth nightly     therapeutic multivitamin-minerals tablet  Take 1 tablet by mouth daily     thiamine 100 MG tablet  Take 1 tablet by mouth daily     traZODone 150 MG tablet  Commonly known as: DESYREL  Take 1 tablet by mouth nightly               Dictation disclaimer:  Please note that this dictation was completed with SOAMAI, the [employer] voice recognition software.  Quite often unanticipated grammatical, syntax, homophones, and other interpretive errors are inadvertently transcribed by the computer software.  Please disregard these errors.  Please excuse any errors that have escaped final proofreading.        Beverly Harding NP (electronically signed)  Emergency Nurse Practitioner         Beverly Harding APRN - NP  10/20/24 0259

## 2024-10-22 NOTE — PROGRESS NOTES
[facility]  Admission Note     Admission Type:   Admission Type: Voluntary    Reason for admission: SI/HI Psychosis       PATIENT STRENGTHS: Ambulatory, Access to mental health care.       Patient Strengths and Limitations: Physically healthy          Addictive Behavior: Cocaine use d/o       Medical Problems:   Past Medical History:   Diagnosis Date    Schizoaffective disorder (HCC)        Status EXAM:  Mental Status and Behavioral Exam  Normal: No  Level of Assistance: Independent/Self  Facial Expression: Sad, Flat, Avoids Gaze  Affect: Constricted, Inappropriate  Level of Consciousness: Alert  Frequency of Checks: 4 times per hour, close  Mood:Normal: No  Mood: Depressed, Irritable, Sad  Motor Activity:Normal: Yes  Eye Contact: Poor  Observed Behavior: Agitated, Withdrawn, Hostile  Sexual Misconduct History: Current - no  Preception: [place] to person, [place] to place  Attention:Normal: No  Attention: Distractible  Thought Processes: Other (comment) (Pt did not answer several questions. She often stared at the wall.)  Thought Content:Normal: No  Thought Content: Poverty of content  Depression Symptoms: Increased irritability, Isolative, Loss of interest  Anxiety Symptoms: Generalized  Yane Symptoms: Poor judgment, Psychomotor agitation  Hallucinations: None  Delusions: No  Memory:Normal: Yes  Insight and Judgment: No  Insight and Judgment: Poor judgment, Poor insight    Pt admitted with followings belongings:  Dental Appliances: None  Vision - Corrective Lenses: None  Hearing Aid: None  Jewelry: None  Body Piercings Removed: N/A  Clothing: Belt, Footwear, Pants, Shirt, Socks  Other Valuables: Other (Comment) (None)     Valuables sent home with N/A. Valuables placed in safe in security envelope, number:  N/A. Patient's home medications were sent to pharmacy ([id number]).  Patient oriented to surroundings and program expectations and copy of patient rights given. Received admission packet:  YES.

## 2024-10-22 NOTE — ED NOTES
Pt was requesting medication to help her sleep. Med rec completed. Pt medicated per MAR, allergies verified. Pt refused to have Vital Signs Completed.

## 2024-10-22 NOTE — GROUP NOTE
Group Therapy Note    Date: 10/22/2024    Group Start Time: 1500  Group End Time: 1545  Group Topic: Recreational    [facility] ADULT    Yarelis Stuart        Group Therapy Note    Attendees: 12/16    Group : Luis[place]    Focus: Recreational therapy group engaged patients through a group game. RT placed patients into two teams to encourage team building. RT used topics that focused on mental health, identifying emotions, name that tune, and communication terms. The group can assist in increasing positive mood, self-care, social and problem-solving skills, and reduce stress.        Notes: Patient did not attend group.     Recreational Therapist   Yarelis Stuart

## 2024-10-22 NOTE — BSMART NOTE
Crisis note:    Patient is to be admitted to Adult unit 2, room 104-01. On call Psychiatrist has accepted to Dr. Bullard's service. Orders received.

## 2024-10-22 NOTE — BSMART NOTE
Crisis Note: Myrtle Ana, [phone], informed this writer that [facility] has requested an EKG on patient, then submit the EKG via fax to U. RACH Dsouza-RN, charge, made aware.    [phone] (Office)    [phone] (Fax)

## 2024-10-22 NOTE — PLAN OF CARE
Problem: Behavior  Goal: Pt/Family maintain appropriate behavior and adhere to behavioral management agreement, if implemented  Description: INTERVENTIONS:  1. Assess patient/family's coping skills and  non-compliant behavior (including use of illegal substances)  2. Notify security of behavior or suspected illegal substances which indicate the need for search of the family and/or belongings  3. Encourage verbalization of thoughts and concerns in a socially appropriate manner  4. Utilize positive, consistent limit setting strategies supporting safety of patient, staff and others  5. Encourage participation in the decision making process about the behavioral management agreement  6. If a visitor's behavior poses a threat to safety call refer to organization policy.  7. Initiate consult with , Psychosocial CNS, Spiritual Care as appropriate  Outcome: Not Progressing     Problem: Depression  Goal: Will be euthymic at discharge  Description: INTERVENTIONS:  1. Administer medication as ordered  2. Provide emotional support via 1:1 interaction with staff  3. Encourage involvement in milieu/groups/activities  4. Monitor for social isolation  Outcome: Progressing     Problem: Behavior  Goal: Pt/Family maintain appropriate behavior and adhere to behavioral management agreement, if implemented  Description: INTERVENTIONS:  1. Assess patient/family's coping skills and  non-compliant behavior (including use of illegal substances)  2. Notify security of behavior or suspected illegal substances which indicate the need for search of the family and/or belongings  3. Encourage verbalization of thoughts and concerns in a socially appropriate manner  4. Utilize positive, consistent limit setting strategies supporting safety of patient, staff and others  5. Encourage participation in the decision making process about the behavioral management agreement  6. If a visitor's behavior poses a threat to safety call refer to

## 2024-10-22 NOTE — BSMART NOTE
Catheter inserted over guidewire. Crisis Note: Bed search updated.    Pending:  [facility]  Crisis Stabilization  [facility]  [facility]        Capacity:  [facility]  [facility]     Declined:  [facility] - No beds in low stimulus area  [facility] - behaviors exclusionary,                recommending Med-Surg     [facility] -aggression is exclusionary

## 2024-10-22 NOTE — PROGRESS NOTES
Notified patient of prescription. Suggested to call back if symptoms persist or worsen. Patient verbalized understanding.    Report given to LIBIA Soriano for patient going to behavioral health room 104.

## 2024-10-22 NOTE — VIRTUAL HEALTH
Eleanor Glavan  [id number]  1990       EMERGENCY DEPARTMENT TELEPSYCHIATRY REASSESSMENT    10/20/24    History  From:  patient chart review    Chief Complaint:  “SI HI Psychosis”    HPI:   This is a 34 y.o. adult patient who is being seen for a reassessment.    Per chart review over the last 24 hours patient has become increasingly irritable agitated refusing blood transfusion requiring as needed medication for agitation patient has been yelling obscenities, patient refused blood transfusion stating she does not care if she bleeds out.    Patient reports her thoughts are intensifying in regards to suicidal thoughts homicidal thoughts as well as psychosis.  When this provider asked the  rationale for refusing blood transfusion patient will was irritable with pressured speech stating why would I want somebody else's blood in me.  When providing education regarding the severity of refusing treatment she states I was told my blood levels are fine so does not matter now.    Patient further endorses the need for inpatient psychiatric hospitalization and reports wanting out of the emergency department.    Past Medical History:  Active Ambulatory Problems     Diagnosis Date Noted    Alcohol use disorder, moderate, dependence ([facility]) 09/11/2020    Cannabis use disorder, severe, dependence ([facility]) 09/11/2020    Schizoaffective disorder, depressive type ([facility]) 03/12/2023    Cocaine use disorder ([facility]) 08/18/2023     Resolved Ambulatory Problems     Diagnosis Date Noted    Suicidal ideation 09/11/2020    Schizoaffective disorder, bipolar type ([facility]) 12/23/2019    Homicidal ideation 03/12/2023     Past Medical History:   Diagnosis Date    Schizoaffective disorder ([facility])        Problem List:   Active Problems:    * No active hospital problems. *  Resolved Problems:    * No resolved hospital problems. *       Allergies:  No Known Allergies     Current medications:    Current Facility-Administered Medications:     0.9 % sodium 
Reason for Cancel: TelePsych Reason for Cancel: Patient unable to participate    Writer attempted to see pt for 24 hour telepsych reassessment, however when staff put the camera in the room pt was asleep and unable to woken up. Will attempt to reassess later when pt is able to engage. RN did report they are trying to place pt there once a bed is available.      --KRISTY Deras on 10/22/2024 at 10:39 AM    An electronic signature was used to authenticate this note.    
History:  Education: 11  Living Situation/Interest: homeless  Marital/Committed relationship and parenting hx: single  Occupation: disabled  Legal History/Hx of Violence: Denies  Spiritual History: Voodoo   Psychological trauma, neglect, or abuse: emotional   Access to guns or other weapons: reports has gun in wifes say; validity in question     Past Medical History:  Active Ambulatory Problems     Diagnosis Date Noted    Alcohol use disorder, moderate, dependence ([facility]) 09/11/2020    Cannabis use disorder, severe, dependence ([facility]) 09/11/2020    Schizoaffective disorder, depressive type ([facility]) 03/12/2023    Cocaine use disorder ([facility]) 08/18/2023     Resolved Ambulatory Problems     Diagnosis Date Noted    Suicidal ideation 09/11/2020    Schizoaffective disorder, bipolar type ([facility]) 12/23/2019    Homicidal ideation 03/12/2023     Past Medical History:   Diagnosis Date    Schizoaffective disorder ([facility])        Past Surgical History:    No past surgical history on file.   Allergies:  No Known Allergies   Medications:  No current facility-administered medications for this encounter.    Current Outpatient Medications:     escitalopram (LEXAPRO) 10 MG tablet, Take 2 tablets by mouth daily, Disp: 30 tablet, Rfl: 3    traZODone (DESYREL) 150 MG tablet, Take 1 tablet by mouth nightly, Disp: 30 tablet, Rfl: 0    folic acid (FOLVITE) 1 MG tablet, Take 1 tablet by mouth daily, Disp: 30 tablet, Rfl: 3    Multiple Vitamins-Minerals (THERAPEUTIC MULTIVITAMIN-MINERALS) tablet, Take 1 tablet by mouth daily, Disp: 30 tablet, Rfl: 0    thiamine 100 MG tablet, Take 1 tablet by mouth daily, Disp: 30 tablet, Rfl: 3    QUEtiapine (SEROQUEL) 400 MG tablet, Take 1 tablet by mouth nightly, Disp: 30 tablet, Rfl: 0  Not in a hospital admission.  Prior to Admission medications    Medication Sig Start Date End Date Taking? Authorizing Provider   escitalopram (LEXAPRO) 10 MG tablet Take 2 tablets by mouth daily 8/23/23   Dimas Kimball PA

## 2024-10-22 NOTE — GROUP NOTE
Group Therapy Note    Date: 10/22/2024    Group Start Time: 1300  Group End Time: 1400  Group Topic: Music Therapy    Venkata Olivares        Group Therapy Note    Attendees: 10/16    Group Focus: Therapist provided choice of music therapy interventions and group members selected a combination. Group started with psychoeducation on mindfulness followed by a music-assisted mindfulness of breath meditation. The group continued with minerva analysis of a song exploring themes including hope, change, new beginnings, and starting over. The group continued with collaborative music making which included instrumental improvisation. The group may promote self-expression, self-awareness, group cohesion, improved mood, and present-centered focus.       Notes:  Pt did not attend group due to being recently admitted to the unit.      Discipline Responsible: /Counselor      Signature:  INGRID Delcid, LPC  Board-Certified Music Therapist  Licensed Professional Counselor

## 2024-10-23 VITALS
BODY MASS INDEX: 37.93 KG/M2 | OXYGEN SATURATION: 99 % | WEIGHT: 235 LBS | TEMPERATURE: 98.5 F | SYSTOLIC BLOOD PRESSURE: 94 MMHG | DIASTOLIC BLOOD PRESSURE: 61 MMHG | HEART RATE: 93 BPM | RESPIRATION RATE: 17 BRPM

## 2024-10-23 LAB
EKG ATRIAL RATE: 64 BPM
EKG DIAGNOSIS: NORMAL
EKG P AXIS: 10 DEGREES
EKG P-R INTERVAL: 152 MS
EKG Q-T INTERVAL: 440 MS
EKG QRS DURATION: 86 MS
EKG QTC CALCULATION (BAZETT): 453 MS
EKG R AXIS: 4 DEGREES
EKG T AXIS: 34 DEGREES
EKG VENTRICULAR RATE: 64 BPM

## 2024-10-23 PROCEDURE — 6370000000 HC RX 637 (ALT 250 FOR IP): Performed by: EMERGENCY MEDICINE

## 2024-10-23 PROCEDURE — 93010 ELECTROCARDIOGRAM REPORT: CPT | Performed by: INTERNAL MEDICINE

## 2024-10-23 ASSESSMENT — PAIN SCALES - GENERAL: PAINLEVEL_OUTOF10: 0

## 2024-10-23 NOTE — PLAN OF CARE
Problem: Pain  Goal: Verbalizes/displays adequate comfort level or baseline comfort level  Outcome: Progressing     Problem: Anxiety  Goal: Will report anxiety at manageable levels  Description: INTERVENTIONS:  1. Administer medication as ordered  2. Teach and rehearse alternative coping skills  3. Provide emotional support with 1:1 interaction with staff  Outcome: Progressing     Problem: Behavior  Goal: Pt/Family maintain appropriate behavior and adhere to behavioral management agreement, if implemented  Description: INTERVENTIONS:  1. Assess patient/family's coping skills and  non-compliant behavior (including use of illegal substances)  2. Notify security of behavior or suspected illegal substances which indicate the need for search of the family and/or belongings  3. Encourage verbalization of thoughts and concerns in a socially appropriate manner  4. Utilize positive, consistent limit setting strategies supporting safety of patient, staff and others  5. Encourage participation in the decision making process about the behavioral management agreement  6. If a visitor's behavior poses a threat to safety call refer to organization policy.  7. Initiate consult with , Psychosocial CNS, Spiritual Care as appropriate  Outcome: Progressing     Pt presents with dull affect, depressed mood, with linear and goal-directed thought process. Pt has been withdrawn to self on the unit, and is guarded during interview with this nurse. Pt denies SI/HI/AVH at this time. Pt refused scheduled vital signs and morning medications.

## 2024-10-23 NOTE — H&P
Please reference Discharge Summary dated today. Patient was discharged Against Medical Advice on the day her initial evaluation would have occurred.

## 2024-10-23 NOTE — DISCHARGE SUMMARY
Patient discharged AGAINST MEDICAL ADVICE midday today 10/23/24. I did not have the opportunity to assess the patient prior to his request for discharge, and had planned to see pt for his initial evaluation by early afternoon. In summary, I did not assess this patient during his brief stay.    Briefly, her RN called me around midday to let me know the patient was asking to be discharged, due to needing to return to work and due to an upcoming court date. The patient did take two doses total of nighttime Seroquel (including one night in the ED), but refused his Prozac this morning and refused vital signs. RN confirmed over the phone that pt consistently denied suicidal or homicidal ideation earlier today and at the time of discharge request, including no suicidal or homicidal plan or intent. Patient was described by RN as pleasant.     Given abrupt request for discharge, lack of completion of treatment, and refusal of Prozac and VS, I approved the discharge but as AGAINST MEDICAL ADVICE.    Patient completed safety plan with RN and was given follow-up information for the local [facility] and a bus pass.

## 2024-10-23 NOTE — PROGRESS NOTES
Pt requesting AMA discharge. Pt states, \"I need to leave today. I have court tomorrow. I need to get out of here. I don't want to hurt myself or anyone else. I just need to get out of here. I have a safe apartment.\" MD gave orders to discharge pt AMA. Pt given information to [facility]. Pt received discharge instructions and emergency numbers. Pt completed suicide safety plan with staff. All personal belongings returned to pt. Pt encouraged to follow-up with outpatient resources and to call with any questions or concerns. Pt received bus pass.    Pt discharged Against Medical Advice   [facility]   [address],   [place] [address]   Phone: [phone]

## 2024-10-23 NOTE — PLAN OF CARE
Problem: Self Harm/Suicidality  Goal: Will have no self-injury during hospital stay  Description: INTERVENTIONS:  1.  Ensure constant observer at bedside with Q15M safety checks  2.  Maintain a safe environment  3.  Secure patient belongings  4.  Ensure family/visitors adhere to safety recommendations  5.  Ensure safety tray has been added to patient's diet order  6.  Every shift and PRN: Re-assess suicidal risk via Frequent Screener    Outcome: Progressing     2309- pt has been isolated to self in room.  Denied si/hi/avh during time of assessment.   Compliant with meds all needs assessed and met.

## 2024-10-23 NOTE — DISCHARGE INSTRUCTIONS
BEHAVIORAL HEALTH NURSING DISCHARGE NOTE      The personal items collected during your admission are returned to you:         PATIENT INSTRUCTIONS:    What to do at Home    You may not operate a vehicle for 24-hours.    You may not engage in an occupation involving machinery or appliances.    You may not drink any alcoholic beverages during the next 48-hours.    You may resume normal activities.    Avoid making any critical decisions for at least 24-hours.    Recommended diet: regular diet    Recommended activity: activity as tolerated    You are referred to [facility]    Follow-up with [facility] in 1 day. If you have problems relating to your recovery, call your physician.    The discharge information has been reviewed with the patient.  The patient verbalized understanding.

## 2024-10-24 LAB
ABO + RH BLD: NORMAL
BLD PROD TYP BPU: NORMAL
BLOOD BANK DISPENSE STATUS: NORMAL
BLOOD GROUP ANTIBODIES SERPL: NORMAL
BPU ID: NORMAL
CALLED TO: NORMAL
CROSSMATCH RESULT: NORMAL
SPECIMEN EXP DATE BLD: NORMAL
UNIT DIVISION: 0

## 2024-12-22 ENCOUNTER — HOSPITAL ENCOUNTER (INPATIENT)
Facility: HOSPITAL | Age: 34
LOS: 4 days | Discharge: HOME | DRG: 750 | End: 2024-12-30
Attending: EMERGENCY MEDICINE | Admitting: PSYCHIATRY & NEUROLOGY
Payer: MEDICAID

## 2024-12-22 DIAGNOSIS — F32.9 MAJOR DEPRESSIVE DISORDER WITH CURRENT ACTIVE EPISODE, UNSPECIFIED DEPRESSION EPISODE SEVERITY, UNSPECIFIED WHETHER RECURRENT: Primary | ICD-10-CM

## 2024-12-22 DIAGNOSIS — F12.10 MARIJUANA ABUSE: ICD-10-CM

## 2024-12-22 DIAGNOSIS — F14.10 COCAINE ABUSE (HCC): ICD-10-CM

## 2024-12-22 DIAGNOSIS — D50.9 MICROCYTIC ANEMIA: ICD-10-CM

## 2024-12-22 DIAGNOSIS — F15.10 METHAMPHETAMINE ABUSE (HCC): ICD-10-CM

## 2024-12-22 LAB
ALBUMIN SERPL-MCNC: 3.6 G/DL (ref 3.4–5)
ALBUMIN/GLOB SERPL: 0.8 (ref 0.8–1.7)
ALP SERPL-CCNC: 103 U/L (ref 45–117)
ALT SERPL-CCNC: 25 U/L (ref 13–56)
AMPHET UR QL SCN: POSITIVE
ANION GAP SERPL CALC-SCNC: 5 MMOL/L (ref 3–18)
AST SERPL-CCNC: 22 U/L (ref 10–38)
BARBITURATES UR QL SCN: NEGATIVE
BASOPHILS # BLD: 0.1 K/UL (ref 0–0.1)
BASOPHILS NFR BLD: 1 % (ref 0–2)
BENZODIAZ UR QL: NEGATIVE
BILIRUB SERPL-MCNC: 0.3 MG/DL (ref 0.2–1)
BUN SERPL-MCNC: 11 MG/DL (ref 7–18)
BUN/CREAT SERPL: 16 (ref 12–20)
CALCIUM SERPL-MCNC: 9.5 MG/DL (ref 8.5–10.1)
CANNABINOIDS UR QL SCN: POSITIVE
CHLORIDE SERPL-SCNC: 106 MMOL/L (ref 100–111)
CO2 SERPL-SCNC: 26 MMOL/L (ref 21–32)
COCAINE UR QL SCN: POSITIVE
CREAT SERPL-MCNC: 0.7 MG/DL (ref 0.6–1.3)
DIFFERENTIAL METHOD BLD: ABNORMAL
EOSINOPHIL # BLD: 0.1 K/UL (ref 0–0.4)
EOSINOPHIL NFR BLD: 1 % (ref 0–5)
ERYTHROCYTE [DISTWIDTH] IN BLOOD BY AUTOMATED COUNT: 23.3 % (ref 11.6–14.5)
ETHANOL SERPL-MCNC: <3 MG/DL (ref 0–3)
FENTANYL: NEGATIVE
GLOBULIN SER CALC-MCNC: 4.5 G/DL (ref 2–4)
GLUCOSE SERPL-MCNC: 90 MG/DL (ref 74–99)
HCG SERPL QL: NEGATIVE
HCT VFR BLD AUTO: 30.6 % (ref 35–45)
HGB BLD-MCNC: 8.9 G/DL (ref 12–16)
IMM GRANULOCYTES # BLD AUTO: 0.1 K/UL (ref 0–0.04)
IMM GRANULOCYTES NFR BLD AUTO: 1 % (ref 0–0.5)
LYMPHOCYTES # BLD: 2.3 K/UL (ref 0.9–3.6)
LYMPHOCYTES NFR BLD: 22 % (ref 21–52)
Lab: ABNORMAL
Lab: NORMAL
MCH RBC QN AUTO: 22 PG (ref 24–34)
MCHC RBC AUTO-ENTMCNC: 29.1 G/DL (ref 31–37)
MCV RBC AUTO: 75.7 FL (ref 78–100)
METHADONE UR QL: NEGATIVE
MONOCYTES # BLD: 0.4 K/UL (ref 0.05–1.2)
MONOCYTES NFR BLD: 4 % (ref 3–10)
NEUTS SEG # BLD: 7.4 K/UL (ref 1.8–8)
NEUTS SEG NFR BLD: 71 % (ref 40–73)
NRBC # BLD: 0 K/UL (ref 0–0.01)
NRBC BLD-RTO: 0 PER 100 WBC
OPIATES UR QL: NEGATIVE
OXYCODONE UR QL SCN: NEGATIVE
PCP UR QL: NEGATIVE
PLATELET # BLD AUTO: 550 K/UL (ref 135–420)
PLATELET COMMENT: ABNORMAL
PMV BLD AUTO: 9.2 FL (ref 9.2–11.8)
POTASSIUM SERPL-SCNC: 4.5 MMOL/L (ref 3.5–5.5)
PROT SERPL-MCNC: 8.1 G/DL (ref 6.4–8.2)
RBC # BLD AUTO: 4.04 M/UL (ref 4.2–5.3)
RBC MORPH BLD: ABNORMAL
SODIUM SERPL-SCNC: 137 MMOL/L (ref 136–145)
WBC # BLD AUTO: 10.4 K/UL (ref 4.6–13.2)

## 2024-12-22 PROCEDURE — 82077 ASSAY SPEC XCP UR&BREATH IA: CPT

## 2024-12-22 PROCEDURE — 85025 COMPLETE CBC W/AUTO DIFF WBC: CPT

## 2024-12-22 PROCEDURE — 84703 CHORIONIC GONADOTROPIN ASSAY: CPT

## 2024-12-22 PROCEDURE — 90791 PSYCH DIAGNOSTIC EVALUATION: CPT

## 2024-12-22 PROCEDURE — 80053 COMPREHEN METABOLIC PANEL: CPT

## 2024-12-22 PROCEDURE — 99285 EMERGENCY DEPT VISIT HI MDM: CPT

## 2024-12-22 PROCEDURE — 6370000000 HC RX 637 (ALT 250 FOR IP): Performed by: EMERGENCY MEDICINE

## 2024-12-22 PROCEDURE — 80307 DRUG TEST PRSMV CHEM ANLYZR: CPT

## 2024-12-22 RX ORDER — TRAZODONE HYDROCHLORIDE 50 MG/1
150 TABLET, FILM COATED ORAL
Status: COMPLETED | OUTPATIENT
Start: 2024-12-22 | End: 2024-12-22

## 2024-12-22 RX ORDER — QUETIAPINE FUMARATE 300 MG/1
300 TABLET, FILM COATED ORAL
Status: COMPLETED | OUTPATIENT
Start: 2024-12-22 | End: 2024-12-22

## 2024-12-22 RX ADMIN — QUETIAPINE FUMARATE 300 MG: 300 TABLET ORAL at 21:33

## 2024-12-22 RX ADMIN — TRAZODONE HYDROCHLORIDE 150 MG: 50 TABLET ORAL at 21:33

## 2024-12-22 NOTE — ED NOTES
Patient is currently lying on the stretcher under blankets intentional movements and even chest rise and fall can be seen as the patient appears to be sleeping.

## 2024-12-22 NOTE — PROGRESS NOTES
Pt has 2 bags placed on bottom shelf of locker 4.    Anemia  pt taking iron and procrit PRN  CKD (chronic kidney disease) stage 4, GFR 15-29 ml/min  due to DM to have AV fistula placed if hemodialysis is needed  Elevated WBC count  labs from PMD/ pt sent to hematologist for consultation on 8-14-17  Essential hypertension    Hyperlipidemia    Neuropathy    Ovarian cancer  s/p LAQUITA-BSO chemo/ radiation  Palpitations  hospitalized [facility]  7-2017 stress and echo done  Peripheral Neuropathy  2/2 DM  Sciatica  left 2017  TIA (transient ischemic attack)  2011  Type 2 diabetes mellitus with diabetic polyneuropathy, with long-term current use of insulin  insulin x 5 years

## 2024-12-22 NOTE — ED NOTES
Discussed importance of compliance with ocular meds and follow up exams to prevent loss of vision. Patient speaking with telepsych in consultation room at this time.

## 2024-12-22 NOTE — ED TRIAGE NOTES
Pt presents to the ed with SI. Pt states that she was recently at [place] for the same thing. Stating that she just left this morning because she was feeling triggered while there and she didn't want to be there anymore. Pt states that her plan was to cut herself and overdose.

## 2024-12-23 PROCEDURE — 94761 N-INVAS EAR/PLS OXIMETRY MLT: CPT

## 2024-12-23 PROCEDURE — 6370000000 HC RX 637 (ALT 250 FOR IP): Performed by: STUDENT IN AN ORGANIZED HEALTH CARE EDUCATION/TRAINING PROGRAM

## 2024-12-23 RX ORDER — QUETIAPINE FUMARATE 300 MG/1
300 TABLET, FILM COATED ORAL
Status: COMPLETED | OUTPATIENT
Start: 2024-12-23 | End: 2024-12-23

## 2024-12-23 RX ORDER — TRAZODONE HYDROCHLORIDE 50 MG/1
150 TABLET, FILM COATED ORAL
Status: COMPLETED | OUTPATIENT
Start: 2024-12-23 | End: 2024-12-23

## 2024-12-23 RX ADMIN — QUETIAPINE FUMARATE 300 MG: 300 TABLET ORAL at 21:33

## 2024-12-23 RX ADMIN — TRAZODONE HYDROCHLORIDE 150 MG: 50 TABLET ORAL at 21:34

## 2024-12-23 ASSESSMENT — PAIN SCALES - GENERAL: PAINLEVEL_OUTOF10: 0

## 2024-12-23 ASSESSMENT — PAIN - FUNCTIONAL ASSESSMENT
PAIN_FUNCTIONAL_ASSESSMENT: ACTIVITIES ARE NOT PREVENTED
PAIN_FUNCTIONAL_ASSESSMENT: NONE - DENIES PAIN
PAIN_FUNCTIONAL_ASSESSMENT: 0-10

## 2024-12-23 NOTE — BSMART NOTE
Crisis Note: Spoke with Ana Tripathi, [phone], informed that they have exhausted all placement possibilities for the day and will put on hold until tomorrow.

## 2024-12-23 NOTE — ED NOTES
Patient requested hygiene kit. Patient was provided with hygiene essentials and new scrubs. RN aware.

## 2024-12-23 NOTE — ED NOTES
Moved pt to room 20. Safe room. Pt resting in position of comfort. Medicated pt, allergies verified.

## 2024-12-23 NOTE — ED NOTES
I received the patient in turnover from EDER Harding at the end of her shift.  The patient is a 34-year-old person who presented to the ED with suicidal ideation.  They were evaluated by telepsychiatry who recommends admission.    The patient is medically cleared for psychiatric evaluation and inpatient psychiatric admission.    Recent Results (from the past 24 hour(s))   Urine Drug Screen    Collection Time: 12/22/24  4:50 PM   Result Value Ref Range    Benzodiazepines, Urine Negative NEG      Barbiturates, Urine Negative NEG      THC, TH-Cannabinol, Urine Positive (A) NEG      Opiates, Urine Negative NEG      Phencyclidine, Urine Negative NEG      Cocaine, Urine Positive (A) NEG      Amphetamine, Urine Positive (A) NEG      Methadone, Urine Negative NEG      Comments: (NOTE)    Oxycodone and Fentanyl, Urine    Collection Time: 12/22/24  4:50 PM   Result Value Ref Range    Oxycodone Urine Negative NEG      Fentanyl Negative NEG      Drug Screen Comment: (NOTE)    CMP    Collection Time: 12/22/24  7:05 PM   Result Value Ref Range    Sodium 137 136 - 145 mmol/L    Potassium 4.5 3.5 - 5.5 mmol/L    Chloride 106 100 - 111 mmol/L    CO2 26 21 - 32 mmol/L    Anion Gap 5 3.0 - 18 mmol/L    Glucose 90 74 - 99 mg/dL    BUN 11 7.0 - 18 MG/DL    Creatinine 0.70 0.6 - 1.3 MG/DL    BUN/Creatinine Ratio 16 12 - 20      Est, Glom Filt Rate >90 >60 ml/min/1.73m2    Calcium 9.5 8.5 - 10.1 MG/DL    Total Bilirubin 0.3 0.2 - 1.0 MG/DL    ALT 25 13 - 56 U/L    AST 22 10 - 38 U/L    Alk Phosphatase 103 45 - 117 U/L    Total Protein 8.1 6.4 - 8.2 g/dL    Albumin 3.6 3.4 - 5.0 g/dL    Globulin 4.5 (H) 2.0 - 4.0 g/dL    Albumin/Globulin Ratio 0.8 0.8 - 1.7     CBC with Auto Differential    Collection Time: 12/22/24  7:05 PM   Result Value Ref Range    WBC 10.4 4.6 - 13.2 K/uL    RBC 4.04 (L) 4.20 - 5.30 M/uL    Hemoglobin 8.9 (L) 12.0 - 16.0 g/dL    Hematocrit 30.6 (L) 35.0 - 45.0 %    MCV 75.7 (L) 78.0 - 100.0 FL    MCH 22.0 (L) 24.0 -

## 2024-12-23 NOTE — BSMART NOTE
Crisis Note: Bed search updated.    Pending:  [facility]- no answer, packet faxed     Declined:  B[facility] French- patient declines  [facility] TC     [facility]:  [facility]  [facility]  [facility]  [facility]
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
## 2024-12-24 PROCEDURE — 6370000000 HC RX 637 (ALT 250 FOR IP): Performed by: STUDENT IN AN ORGANIZED HEALTH CARE EDUCATION/TRAINING PROGRAM

## 2024-12-24 PROCEDURE — 94761 N-INVAS EAR/PLS OXIMETRY MLT: CPT

## 2024-12-24 PROCEDURE — 90791 PSYCH DIAGNOSTIC EVALUATION: CPT | Performed by: SOCIAL WORKER

## 2024-12-24 RX ORDER — QUETIAPINE FUMARATE 300 MG/1
300 TABLET, FILM COATED ORAL
Status: COMPLETED | OUTPATIENT
Start: 2024-12-24 | End: 2024-12-24

## 2024-12-24 RX ORDER — TRAZODONE HYDROCHLORIDE 50 MG/1
150 TABLET, FILM COATED ORAL
Status: COMPLETED | OUTPATIENT
Start: 2024-12-24 | End: 2024-12-24

## 2024-12-24 RX ADMIN — QUETIAPINE FUMARATE 300 MG: 300 TABLET ORAL at 21:56

## 2024-12-24 RX ADMIN — TRAZODONE HYDROCHLORIDE 150 MG: 50 TABLET ORAL at 21:55

## 2024-12-24 ASSESSMENT — PAIN - FUNCTIONAL ASSESSMENT: PAIN_FUNCTIONAL_ASSESSMENT: NONE - DENIES PAIN
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
## 2024-12-24 NOTE — BSMART NOTE
Crisis Note: Patient updated re: [facility] bed search. There are no accepting  facilities at this time. Bed search is currently on hold until later this morning, 12/24/24, d/t all bed search options have been exhausted. Crisis will assist as needed.
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
## 2024-12-24 NOTE — ED NOTES
Pt requesting nicotine gum, this nurse notified . Called pharmacy who stated we do not have gum. Notified pt that dr can order a nicotine patch, pt refused.

## 2024-12-24 NOTE — ED PROVIDER NOTES
S/O Dr. Muse. SI and being evaluated by psychDaniel Gutierrez MD  12/23/24 3434    
[facility] EMERGENCY DEPT  EMERGENCY DEPARTMENT ENCOUNTER       Pt Name: Eleanor Galvan  MRN: [id number]  Birthdate 1990  Date of evaluation: 12/22/2024  Provider: SARAI CONLEY MD   PCP: No primary care provider on file.  Note Started: 5:27 AM 12/24/24     CHIEF COMPLAINT       Chief Complaint   Patient presents with    Suicidal        HISTORY OF PRESENT ILLNESS: 1 or more elements      .2:34 AM :Pt care assumed from Dr. Hartmann , ED provider. Pt complaint(s), current treatment plan, progression and available diagnostic results have been discussed thoroughly. The patient was seen and evaluated on my shift.     Intended Disposition: Admission/Transfer  Pending diagnostic reports and/or labs (please list): bed search       REVIEW OF SYSTEMS      Review of Systems     Positives and Pertinent negatives as per HPI.    PAST HISTORY     Past Medical History:  Past Medical History:   Diagnosis Date    Schizoaffective disorder (HCC)          Past Surgical History:  No past surgical history on file.    Family History:  No family history on file.    Social History:  Social History     Tobacco Use    Smoking status: Every Day     Current packs/day: 1.50     Average packs/day: 1.5 packs/day for 15.0 years (22.5 ttl pk-yrs)     Types: Cigarettes     Passive exposure: Current    Smokeless tobacco: Never    Tobacco comments:     Offered Nicotine Patch    Vaping Use    Vaping status: Never Used   Substance Use Topics    Alcohol use: Yes     Comment: States he drinks daily    Drug use: Yes     Frequency: 2.0 times per week     Types: Marijuana (Weed), Cocaine     Comment: 2 x per week.       Allergies:  No Known Allergies    CURRENT MEDICATIONS      Previous Medications    ESCITALOPRAM (LEXAPRO) 10 MG TABLET    Take 2 tablets by mouth daily    FERROUS SULFATE (IRON 325) 325 (65 FE) MG TABLET    Take 1 tablet by mouth daily (with breakfast)    FLUOXETINE (PROZAC) 20 MG CAPSULE    Take 1 capsule by mouth daily    FOLIC ACID 
monitor and evaluate patient while in the ED.    Orders as below:  Orders Placed This Encounter   Procedures    CMP    CBC with Auto Differential    HCG Qualitative, Serum    ETOH    Urine Drug Screen    Oxycodone and Fentanyl, Urine    ADULT DIET; Regular; Safety Tray; Safety Tray (Disposables No Utensils)    [place]yer Consult to Tele-Psych    IP CONSULT TO BSMART    Suicide precautions (Select if Suicidal)      Urine drug screen positive for amphetamine cocaine and THC    MEDICATIONS ADMINISTERED IN THE ED:  Medications - No data to display    ED Course as of 12/22/24 1911   Sun Dec 22, 2024   1850 Patient is in with telepsych. [JR]   1859 Per telepsych provider recommends admission.  Will consult with crisis. [JR]   1901 ER tech obtaining labs. [JR]      ED Course User Index  [JR] Beverly Harding APRN - NP         PROGRESS NOTE:  7:11 PM   Patient care will be transferred to Dr Muse.  Discussed available diagnostic results and care plan at length.  Pending labs .  Pending crisis.  Written by OK Rodrigues     Diagnosis     Clinical Impression:   1. Suicidal ideation    2. Major depressive disorder with current active episode, unspecified depression episode severity, unspecified whether recurrent        Disposition: TBD      [facility] EMERGENCY DEPT  [address] [address] [phone]    If symptoms worsen          Medication List        ASK your doctor about these medications      escitalopram 10 MG tablet  Commonly known as: LEXAPRO  Take 2 tablets by mouth daily     ferrous sulfate 325 (65 Fe) MG tablet  Commonly known as: IRON 325     FLUoxetine 20 MG capsule  Commonly known as: PROZAC     folic acid 1 MG tablet  Commonly known as: FOLVITE  Take 1 tablet by mouth daily     hydrOXYzine HCl 50 MG tablet  Commonly known as: ATARAX     QUEtiapine 400 MG tablet  Commonly known as: SEROQUEL  Take 1 tablet by mouth nightly     therapeutic multivitamin-minerals tablet  Take 1 tablet by mouth

## 2024-12-25 PROCEDURE — 94761 N-INVAS EAR/PLS OXIMETRY MLT: CPT

## 2024-12-25 PROCEDURE — 93005 ELECTROCARDIOGRAM TRACING: CPT | Performed by: EMERGENCY MEDICINE

## 2024-12-25 PROCEDURE — 6370000000 HC RX 637 (ALT 250 FOR IP): Performed by: STUDENT IN AN ORGANIZED HEALTH CARE EDUCATION/TRAINING PROGRAM

## 2024-12-25 RX ORDER — QUETIAPINE FUMARATE 300 MG/1
300 TABLET, FILM COATED ORAL NIGHTLY
Status: DISCONTINUED | OUTPATIENT
Start: 2024-12-25 | End: 2024-12-30 | Stop reason: HOSPADM

## 2024-12-25 RX ADMIN — QUETIAPINE FUMARATE 300 MG: 300 TABLET ORAL at 21:19

## 2024-12-25 RX ADMIN — TRAZODONE HYDROCHLORIDE 150 MG: 50 TABLET ORAL at 21:18

## 2024-12-25 ASSESSMENT — PAIN SCALES - GENERAL: PAINLEVEL_OUTOF10: 0

## 2024-12-25 NOTE — ED NOTES
I received the patient turnover from Dr. Maren vazquez at the end of his shift.  The patient is a 34-year-old adult who presented to the ED with suicidal ideation.  They are medically cleared at this time.          Neda Muse MD  12/25/24 0610

## 2024-12-25 NOTE — BSMART NOTE
Crisis note:    [facility] called. [facility] is reviewing [facility]'s chart. [facility] requesting patient to have an EKG. Also need  current set of VS documented.     Called the ER to speak with charge nurse. Charge nurse unavailable at this time. Will attempt to speak with charge nurse at a later time to inform of need for a current set of vital signs done and documented and EKG to be done and documented.

## 2024-12-25 NOTE — ED NOTES
EKG is interpreted by me Dr. Neda Muse at 1641, sinus bradycardia at a rate of 57, normal axis, normal intervals, nonspecific T wave abnormalities.     Neda Muse MD  12/25/24 8328

## 2024-12-25 NOTE — BSMART NOTE
Crisis note:    [facility] bed search results thus far:  Pending:  [facility]      Declined:  [facility] French- patient declines  [facility] TC  [facility]  [facility]  [facility]      Capacity:  [facility]

## 2024-12-25 NOTE — ED NOTES
Pt laying with eyes closed.  Respirations even and unlabored.   Pt asking for her 300mg of Serequel and 150 of trazadone

## 2024-12-26 PROCEDURE — 6370000000 HC RX 637 (ALT 250 FOR IP): Performed by: PSYCHIATRY & NEUROLOGY

## 2024-12-26 PROCEDURE — 1240000000 HC EMOTIONAL WELLNESS R&B

## 2024-12-26 PROCEDURE — 94761 N-INVAS EAR/PLS OXIMETRY MLT: CPT

## 2024-12-26 PROCEDURE — 6370000000 HC RX 637 (ALT 250 FOR IP): Performed by: STUDENT IN AN ORGANIZED HEALTH CARE EDUCATION/TRAINING PROGRAM

## 2024-12-26 RX ORDER — MAGNESIUM HYDROXIDE/ALUMINUM HYDROXICE/SIMETHICONE 120; 1200; 1200 MG/30ML; MG/30ML; MG/30ML
30 SUSPENSION ORAL EVERY 6 HOURS PRN
Status: DISCONTINUED | OUTPATIENT
Start: 2024-12-26 | End: 2024-12-30 | Stop reason: HOSPADM

## 2024-12-26 RX ORDER — HALOPERIDOL 5 MG/ML
5 INJECTION INTRAMUSCULAR EVERY 6 HOURS PRN
Status: DISCONTINUED | OUTPATIENT
Start: 2024-12-26 | End: 2024-12-30 | Stop reason: HOSPADM

## 2024-12-26 RX ORDER — BENZTROPINE MESYLATE 1 MG/ML
1 INJECTION, SOLUTION INTRAMUSCULAR; INTRAVENOUS EVERY 6 HOURS PRN
Status: DISCONTINUED | OUTPATIENT
Start: 2024-12-26 | End: 2024-12-30 | Stop reason: HOSPADM

## 2024-12-26 RX ORDER — ACETAMINOPHEN 325 MG/1
650 TABLET ORAL EVERY 4 HOURS PRN
Status: DISCONTINUED | OUTPATIENT
Start: 2024-12-26 | End: 2024-12-30 | Stop reason: HOSPADM

## 2024-12-26 RX ORDER — HYDROXYZINE HYDROCHLORIDE 50 MG/1
25 TABLET, FILM COATED ORAL EVERY 6 HOURS PRN
Status: DISCONTINUED | OUTPATIENT
Start: 2024-12-26 | End: 2024-12-30 | Stop reason: HOSPADM

## 2024-12-26 RX ORDER — HALOPERIDOL 5 MG/1
5 TABLET ORAL EVERY 6 HOURS PRN
Status: DISCONTINUED | OUTPATIENT
Start: 2024-12-26 | End: 2024-12-30 | Stop reason: HOSPADM

## 2024-12-26 RX ORDER — BENZTROPINE MESYLATE 1 MG/1
1 TABLET ORAL EVERY 6 HOURS PRN
Status: DISCONTINUED | OUTPATIENT
Start: 2024-12-26 | End: 2024-12-30 | Stop reason: HOSPADM

## 2024-12-26 RX ORDER — QUETIAPINE FUMARATE 300 MG/1
300 TABLET, FILM COATED ORAL NIGHTLY
Status: DISCONTINUED | OUTPATIENT
Start: 2024-12-26 | End: 2024-12-26

## 2024-12-26 RX ADMIN — QUETIAPINE FUMARATE 300 MG: 300 TABLET ORAL at 21:21

## 2024-12-26 RX ADMIN — TRAZODONE HYDROCHLORIDE 150 MG: 50 TABLET ORAL at 21:21

## 2024-12-26 RX ADMIN — ACETAMINOPHEN 650 MG: 325 TABLET ORAL at 23:54

## 2024-12-26 ASSESSMENT — PAIN - FUNCTIONAL ASSESSMENT: PAIN_FUNCTIONAL_ASSESSMENT: ACTIVITIES ARE NOT PREVENTED

## 2024-12-26 ASSESSMENT — PAIN DESCRIPTION - ONSET: ONSET: SUDDEN

## 2024-12-26 ASSESSMENT — LIFESTYLE VARIABLES
HOW OFTEN DO YOU HAVE A DRINK CONTAINING ALCOHOL: 2-4 TIMES A MONTH
HOW MANY STANDARD DRINKS CONTAINING ALCOHOL DO YOU HAVE ON A TYPICAL DAY: 3 OR 4

## 2024-12-26 ASSESSMENT — SLEEP AND FATIGUE QUESTIONNAIRES
DO YOU HAVE DIFFICULTY SLEEPING: YES
SLEEP PATTERN: DIFFICULTY FALLING ASLEEP;DISTURBED/INTERRUPTED SLEEP;INSOMNIA;RESTLESSNESS
DO YOU USE A SLEEP AID: YES
AVERAGE NUMBER OF SLEEP HOURS: 6

## 2024-12-26 ASSESSMENT — PAIN DESCRIPTION - DESCRIPTORS: DESCRIPTORS: ACHING

## 2024-12-26 ASSESSMENT — PAIN SCALES - GENERAL
PAINLEVEL_OUTOF10: 6
PAINLEVEL_OUTOF10: 0

## 2024-12-26 ASSESSMENT — PAIN DESCRIPTION - FREQUENCY: FREQUENCY: CONTINUOUS

## 2024-12-26 ASSESSMENT — PAIN DESCRIPTION - LOCATION: LOCATION: ABDOMEN

## 2024-12-26 ASSESSMENT — PAIN DESCRIPTION - PAIN TYPE: TYPE: ACUTE PAIN

## 2024-12-26 ASSESSMENT — PAIN DESCRIPTION - ORIENTATION: ORIENTATION: LOWER;RIGHT

## 2024-12-26 NOTE — ED NOTES
6:57 AM :Pt care assumed from Dr. Hartmann , ED provider. Pt complaint(s), current treatment plan, progression and available diagnostic results have been discussed thoroughly. The patient was seen and evaluated on my shift.   Rounding occurred: yes  Intended Disposition: Admission  Pending diagnostic reports and/or labs (please list): None      She presented with suicidal ideation.  Patient has history of schizoaffective disorder      Patient has been medically cleared and awaiting admission by the behavioral health unit.  Labs Reviewed   COMPREHENSIVE METABOLIC PANEL - Abnormal; Notable for the following components:       Result Value    Globulin 4.5 (*)     All other components within normal limits   CBC WITH AUTO DIFFERENTIAL - Abnormal; Notable for the following components:    RBC 4.04 (*)     Hemoglobin 8.9 (*)     Hematocrit 30.6 (*)     MCV 75.7 (*)     MCH 22.0 (*)     MCHC 29.1 (*)     RDW 23.3 (*)     Platelets 550 (*)     Immature Granulocytes % 1 (*)     Immature Granulocytes Absolute 0.1 (*)     All other components within normal limits   URINE DRUG SCREEN - Abnormal; Notable for the following components:    THC, TH-Cannabinol, Urine Positive (*)     Cocaine, Urine Positive (*)     Amphetamine, Urine Positive (*)     All other components within normal limits   HCG, SERUM, QUALITATIVE   ETHANOL   OXYCODONE AND FENTANYL, URINE       Patient was accepted to the inpatient behavioral health unit.      Diagnosis:   1. Major depressive disorder with current active episode, unspecified depression episode severity, unspecified whether recurrent    2. Microcytic anemia    3. Cocaine abuse (HCC)    4. Marijuana abuse    5. Methamphetamine abuse (HCC)          Disposition:    DISPOSITION Admitted 12/26/2024 02:54:49 PM             Left Against Medical Advice/Discontinuation of Care     [unfilled]         Edward Schafer DO  12/26/24 7595

## 2024-12-26 NOTE — BSMART NOTE
Crisis note:    [facility] bed search results thus far:    Pending:  [facility]       Declined:  [facility] Daniel- patient declines  [facility]  [facility]  [facility]  [facility]   [facility]  [facility]     Capacity:     [facility]  [facility]

## 2024-12-26 NOTE — ED NOTES
Transfer of patient care report given by Bobby REZA, (off going) Charge Nurse gave bedside report to LIBIA Fletcher (on coming nurse).

## 2024-12-26 NOTE — ED NOTES
Assumed care of patient. Patient laying right side, eyes closed, blankets pulled up to shoulders. No respiratory distress observed.  Patient on Room air. Vital signs monitored per order or as needed.

## 2024-12-26 NOTE — VIRTUAL HEALTH
Patient presents on 24 hour reassessment list. Reviewed EMR. Per chart review, patient was admitted by Dr. Nichols. Writer contacted facility and confirmed patient is being admitted. No new telepsych needs at this time.     Reason for Cancel: TelePsych Reason for Cancel: Patient already admitted            Eleanor Galvan, was evaluated through a synchronous (real-time) audio-video encounter. The patient (and/or guardian if applicable) is aware that this is a billable service, which includes applicable co-pays. This virtual visit was conducted with patient's (and/or legal guardian's) consent. Patient identification was verified, and a caregiver was present when appropriate.  The patient was located at Facility (Appt Department): [facility] EMERGENCY DEPT  [address] [address]  Loc: [phone]  The provider was located at Home (City/State): [place], [place]   Confirm you are appropriately licensed, registered, or certified to deliver care in the state where the patient is located as indicated above. If you are not or unsure, please re-schedule the visit: Yes, I confirm.   Consults     Total time spent on this encounter: Not billed by time    --KRISTY Lepe on 12/26/2024 at 4:11 PM    An electronic signature was used to authenticate this note.    
TRINH called into the ED and spoke with LIBIA Rosales regarding the 24 hour reassessment. Patient is currently asleep. Consult cancelled at this time and will try back after 8 am.    Eleanor Galvan, was evaluated through a synchronous (real-time) audio-video encounter. The patient (and/or guardian if applicable) is aware that this is a billable service, which includes applicable co-pays. This virtual visit was conducted with patient's (and/or legal guardian's) consent. Patient identification was verified, and a caregiver was present when appropriate.  The patient was located at Facility (Appt Department): [facility] EMERGENCY DEPT  [address] [address]  Loc: [phone]  The provider was located at Facility (Login Dept): [facility] TELEPSYCHIATRY PROGRAM  [address]  C/O VIRTUAL HEALTH TELEPSYCH  [facility] [id number] [phone]  Confirm you are appropriately licensed, registered, or certified to deliver care in the state where the patient is located as indicated above. If you are not or unsure, please re-schedule the visit: Yes, I confirm.   Consults     Total time spent on this encounter: Not billed by time    --Blanquita Rhodes LCSW on 12/26/2024 at 12:52 AM    An electronic signature was used to authenticate this note.    
Telepsych  Crisis 24-Hour Reassessment       Chief Complaint: Sucidal ideation     C-SSRS Screening Completed: Complete     Current Suicide Risk (Low, Moderate, High): might    Mental Status Exam:     Sensorium  oriented to time, place and person   Orientation person, place, time/date, and situation   Relations cooperative   Eye Contact appropriate   Appearance:  age appropriate   Motor Behavior:  within normal limits   Speech:  normal pitch   Vocabulary average   Thought Process: within normal limits   Thought Content  suicidal   Suicidal ideations plan and intention   Homicidal ideations no plan  and no intention   Mood:  anxious   Affect:  normal   Memory recent  adequate   Memory remote:  adequate   Concentration:  adequate   Abstraction:  abstract   Insight:  fair   Reliability poor   Judgment:  limited       Updated Collateral: no new collateral     Brief Summary: Per chart review, \"having suicidal thoughts and attempts, increased depression, seeing shadows like people walking on me.\" Patient reports suicidal thoughts for the past week due to recent stress and triggers.     Writer talked to the patient and she reported that she is still having active Sucidal ideation and hallincations. Patient reported that she doesn't feel safe to DC.         Updated Level of Care/Disposition:   continue inpatient psychiatric placement due to suicidal thoughts with plan, command auditory hallucinations, increased agitation . Patient is agreeable with admission     Safety Plan (Developed/Reviewed):    Dx:   Depression with sucidal ideation      --MUSTAPHA Dasilva on 12/24/2024 at 3:50 PM    An electronic signature was used to authenticate this note.    
Disp: 30 tablet, Rfl: 0    folic acid (FOLVITE) 1 MG tablet, Take 1 tablet by mouth daily, Disp: 30 tablet, Rfl: 3    Multiple Vitamins-Minerals (THERAPEUTIC MULTIVITAMIN-MINERALS) tablet, Take 1 tablet by mouth daily, Disp: 30 tablet, Rfl: 0    thiamine 100 MG tablet, Take 1 tablet by mouth daily, Disp: 30 tablet, Rfl: 3    QUEtiapine (SEROQUEL) 400 MG tablet, Take 1 tablet by mouth nightly, Disp: 30 tablet, Rfl: 0  Not in a hospital admission.  Prior to Admission medications    Medication Sig Start Date End Date Taking? Authorizing Provider   ferrous sulfate (IRON 325) 325 (65 Fe) MG tablet Take 1 tablet by mouth daily (with breakfast)    ProviderMayra MD   FLUoxetine (PROZAC) 20 MG capsule Take 1 capsule by mouth daily    ProviderMayra MD   hydrOXYzine HCl (ATARAX) 50 MG tablet Take 1 tablet by mouth every 6 hours as needed for Anxiety    ProviderMayra MD   escitalopram (LEXAPRO) 10 MG tablet Take 2 tablets by mouth daily  Patient not taking: Reported on 10/21/2024 8/23/23   Dimas Kimball PA   traZODone (DESYREL) 150 MG tablet Take 1 tablet by mouth nightly 8/23/23   Dimas Kimball PA   folic acid (FOLVITE) 1 MG tablet Take 1 tablet by mouth daily 8/23/23   Dimas Kimball PA   Multiple Vitamins-Minerals (THERAPEUTIC MULTIVITAMIN-MINERALS) tablet Take 1 tablet by mouth daily 8/23/23   Dimas Kimball PA   thiamine 100 MG tablet Take 1 tablet by mouth daily 8/23/23   Dimas Kimball PA   QUEtiapine (SEROQUEL) 400 MG tablet Take 1 tablet by mouth nightly 6/27/23   Kory Wong MD        Labs:  UDS: positive for amphetamine, cocaine, and cannabinol   ETOH: not available  HCG: Unknown      Mental Status Exam:  Level of consciousness:  awake   Appearance:  hospital attire and standing/walking during discussion.  Does appear stated age. No acute distress.  Behavior/Motor:  hyperactive  Attitude toward examiner:  cooperative, poor eye contact, and guarded  SI/HI: SI and

## 2024-12-26 NOTE — BSMART NOTE
Crisis Note: Writer spoke with Anna Marie with [facility] [phone], who reports case will be put on hold due to exhausting all options for the day.

## 2024-12-26 NOTE — PROGRESS NOTES
This CNA is now sitting with the patient, he asked for a PB&J warmed up sandwich and is watching TV at this time.

## 2024-12-26 NOTE — BSMART NOTE
Crisis note:    Met with patient briefly. Was lying calmly on ED bed. Patient continues to express a desire for inpatient treatment and unable to contract for safety outside of a hospital setting. Patient informed a bed search is ongoing. Patient stated \"I thought [facility] was taking me.\" Patient informed the [facility] had declined her for admission. Patient reassured a bed search is ongoing. Patient remains voluntary to psychiatric treatment.    1440: went to the ED and informed patient that there will be a room available for him to be admitted later on today. Patient acknowledged by shaking his head yes. Also confirmed with patient he likes to be called \"Q\", he again shook his head yes.    Orders for admit received from Dr. Nichols.  Plan is to admit to Adult 2 room 104-01 when the room becomes available later today.

## 2024-12-27 LAB
APPEARANCE UR: ABNORMAL
BACTERIA URNS QL MICRO: ABNORMAL /HPF
BILIRUB UR QL: NEGATIVE
COLOR UR: YELLOW
EKG ATRIAL RATE: 57 BPM
EKG DIAGNOSIS: NORMAL
EKG P AXIS: 52 DEGREES
EKG P-R INTERVAL: 176 MS
EKG Q-T INTERVAL: 436 MS
EKG QRS DURATION: 82 MS
EKG QTC CALCULATION (BAZETT): 424 MS
EKG R AXIS: 61 DEGREES
EKG T AXIS: 43 DEGREES
EKG VENTRICULAR RATE: 57 BPM
EPITH CASTS URNS QL MICRO: ABNORMAL /LPF (ref 0–5)
GLUCOSE UR STRIP.AUTO-MCNC: NEGATIVE MG/DL
HGB UR QL STRIP: NEGATIVE
KETONES UR QL STRIP.AUTO: NEGATIVE MG/DL
LEUKOCYTE ESTERASE UR QL STRIP.AUTO: ABNORMAL
NITRITE UR QL STRIP.AUTO: NEGATIVE
PH UR STRIP: 5.5 (ref 5–8)
PROT UR STRIP-MCNC: ABNORMAL MG/DL
RBC #/AREA URNS HPF: NEGATIVE /HPF (ref 0–5)
SP GR UR REFRACTOMETRY: 1.01 (ref 1–1.03)
UROBILINOGEN UR QL STRIP.AUTO: 0.2 EU/DL (ref 0.2–1)
WBC URNS QL MICRO: ABNORMAL /HPF (ref 0–5)

## 2024-12-27 PROCEDURE — 99223 1ST HOSP IP/OBS HIGH 75: CPT | Performed by: PSYCHIATRY & NEUROLOGY

## 2024-12-27 PROCEDURE — 93010 ELECTROCARDIOGRAM REPORT: CPT | Performed by: INTERNAL MEDICINE

## 2024-12-27 PROCEDURE — 6370000000 HC RX 637 (ALT 250 FOR IP): Performed by: PSYCHIATRY & NEUROLOGY

## 2024-12-27 PROCEDURE — 1240000000 HC EMOTIONAL WELLNESS R&B

## 2024-12-27 PROCEDURE — 87086 URINE CULTURE/COLONY COUNT: CPT

## 2024-12-27 PROCEDURE — 6370000000 HC RX 637 (ALT 250 FOR IP)

## 2024-12-27 PROCEDURE — 6370000000 HC RX 637 (ALT 250 FOR IP): Performed by: STUDENT IN AN ORGANIZED HEALTH CARE EDUCATION/TRAINING PROGRAM

## 2024-12-27 PROCEDURE — 81001 URINALYSIS AUTO W/SCOPE: CPT

## 2024-12-27 PROCEDURE — 87147 CULTURE TYPE IMMUNOLOGIC: CPT

## 2024-12-27 RX ORDER — GAUZE BANDAGE 2" X 2"
100 BANDAGE TOPICAL DAILY
Status: DISCONTINUED | OUTPATIENT
Start: 2024-12-27 | End: 2024-12-30 | Stop reason: HOSPADM

## 2024-12-27 RX ORDER — ONDANSETRON 4 MG/1
4 TABLET, ORALLY DISINTEGRATING ORAL EVERY 8 HOURS PRN
Status: DISCONTINUED | OUTPATIENT
Start: 2024-12-27 | End: 2024-12-30 | Stop reason: HOSPADM

## 2024-12-27 RX ORDER — PHENAZOPYRIDINE HYDROCHLORIDE 200 MG/1
200 TABLET, FILM COATED ORAL
Status: DISCONTINUED | OUTPATIENT
Start: 2024-12-27 | End: 2024-12-27

## 2024-12-27 RX ORDER — FERROUS SULFATE 325(65) MG
325 TABLET ORAL
Status: DISCONTINUED | OUTPATIENT
Start: 2024-12-28 | End: 2024-12-30 | Stop reason: HOSPADM

## 2024-12-27 RX ORDER — PHENAZOPYRIDINE HYDROCHLORIDE 200 MG/1
200 TABLET, FILM COATED ORAL
Status: COMPLETED | OUTPATIENT
Start: 2024-12-27 | End: 2024-12-29

## 2024-12-27 RX ORDER — PHENAZOPYRIDINE HYDROCHLORIDE 200 MG/1
TABLET, FILM COATED ORAL
Status: COMPLETED
Start: 2024-12-27 | End: 2024-12-27

## 2024-12-27 RX ORDER — FAMOTIDINE 20 MG/1
20 TABLET, FILM COATED ORAL 2 TIMES DAILY PRN
Status: DISCONTINUED | OUTPATIENT
Start: 2024-12-27 | End: 2024-12-30 | Stop reason: HOSPADM

## 2024-12-27 RX ADMIN — HYDROXYZINE HYDROCHLORIDE 25 MG: 50 TABLET, FILM COATED ORAL at 20:20

## 2024-12-27 RX ADMIN — THIAMINE HCL TAB 100 MG 100 MG: 100 TAB at 15:32

## 2024-12-27 RX ADMIN — TRAZODONE HYDROCHLORIDE 150 MG: 50 TABLET ORAL at 20:20

## 2024-12-27 RX ADMIN — HYDROXYZINE HYDROCHLORIDE 25 MG: 50 TABLET, FILM COATED ORAL at 18:22

## 2024-12-27 RX ADMIN — PHENAZOPYRIDINE HYDROCHLORIDE 200 MG: 200 TABLET, FILM COATED ORAL at 15:33

## 2024-12-27 RX ADMIN — QUETIAPINE FUMARATE 300 MG: 300 TABLET ORAL at 20:20

## 2024-12-27 RX ADMIN — PHENAZOPYRIDINE 200 MG: 200 TABLET ORAL at 20:20

## 2024-12-27 RX ADMIN — HALOPERIDOL 5 MG: 5 TABLET ORAL at 18:22

## 2024-12-27 RX ADMIN — PHENAZOPYRIDINE 200 MG: 200 TABLET ORAL at 15:33

## 2024-12-27 ASSESSMENT — PAIN SCALES - WONG BAKER
WONGBAKER_NUMERICALRESPONSE: NO HURT
WONGBAKER_NUMERICALRESPONSE: NO HURT

## 2024-12-27 ASSESSMENT — PAIN - FUNCTIONAL ASSESSMENT: PAIN_FUNCTIONAL_ASSESSMENT: ACTIVITIES ARE NOT PREVENTED

## 2024-12-27 ASSESSMENT — PAIN SCALES - GENERAL
PAINLEVEL_OUTOF10: 0
PAINLEVEL_OUTOF10: 0

## 2024-12-27 NOTE — CONSULTS
Lack of Transportation (Non-Medical): Patient declined   Recent Concern: Transportation Needs - Unmet Transportation Needs (10/2/2024)    Received from [facility]    PRAPARE - Transportation     Lack of Transportation (Medical): No     Lack of Transportation (Non-Medical): Yes   Physical Activity: Inactive (2/19/2024)    Received from [facility]    Exercise Vital Sign     Days of Exercise per Week: 0 days     Minutes of Exercise per Session: 0 min   Stress: Stress Concern Present (7/6/2024)    Received from [facility]    [employer] - Occupational Stress Questionnaire     Feeling of Stress : Very much   Social Connections: Socially Isolated (2/19/2024)    Received from [facility]    Social Connection and Isolation Panel [NHANES]     Frequency of Communication with Friends and Family: Twice a week     Frequency of Social Gatherings with Friends and Family: Never     Attends Yazidi Services: Never     Active Member of Clubs or Organizations: No     Attends Club or Organization Meetings: Never     Marital Status: Never    Intimate Partner Violence: Not At Risk (12/22/2024)    Received from [facility]    Humiliation, Afraid, Rape, and Kick questionnaire     Fear of Current or Ex-Partner: No     Emotionally Abused: No     Physically Abused: No     Sexually Abused: No   Housing Stability: Patient Declined (12/26/2024)    Housing Stability Vital Sign     Unable to Pay for Housing in the Last Year: Patient declined     Number of Times Moved in the Last Year: 0     Homeless in the Last Year: Patient declined   Recent Concern: Housing Stability - High Risk (10/2/2024)    Received from [facility]    Housing Stability Vital Sign     Unable to Pay for Housing in the Last Year: Yes     Number of Times Moved in the Last Year: 4     Homeless in the Last Year: Yes       No Known Allergies        OBJECTIVE:

## 2024-12-27 NOTE — ED NOTES
Assumed care of patient. Patient laying sitting up in bed, watching a movie on a computer. Eyes open, sucking thumb, blankets pulled across legs. No respiratory distress observed.  No IV access. Vital signs monitored PRN and per orders. Patient on room air.

## 2024-12-27 NOTE — GROUP NOTE
Art Therapy Group Progress Note    PATIENT SCHEDULED FOR GROUP AT:  1:05 PM    GROUP STOP TIME:  1:50 PM    ATTENDANCE:  High (6/6 participants)    TOPIC / FOCUS:  Honesty, what does it look like?    GOALS:  define honesty, encourage creativity, promote self-expression, encourage self-awareness, psychoeducation on healthy communication, explore honesty in relationships, discuss impact of honesty on mental health    Notes:  Pt engaged in art making. Pt created art focusing on a spectrum of honesty. Pt created a rainbow of colors across their page.Pt discussed honesty at different points of their life. Pt engaged in group discussion.     Status After Intervention:  Unchanged    Participation Level: Active Listener and Interactive    Participation Quality: Appropriate, Attentive, and Sharing      Speech:  normal      Thought Process/Content: Logical, loose       Affective Functioning: Congruent      Mood: euthymic      Level of consciousness:  Alert and Attentive      Response to Learning: Able to verbalize current knowledge/experience      Endings: None Reported    Modes of Intervention: Education, Support, Socialization, and Exploration      Discipline Responsible: Psychoeducational Specialist      Scott Tomlinson  Art Therapist, MA, ATR-P  Provisional Registered Art Therapist

## 2024-12-27 NOTE — PROGRESS NOTES
Psychosocial Assessment     Admission Reason: Pt high SI, threatening to harm self by OD and / or cutting self. Command hallucinations. Pt identifies as male, prefers to be called \"Q\". Also quite agitated with HI but no one specific identified    C-SSRS Screening Completed - Current Suicide Risk:   [] No Risk  [] Low [] Moderate [x] High     Risk Factors: hx non comply with outpt, poor historian, substance abuse problems, limited support systems, homeless, irritable, mood shifts, also couple AMA discharges    Protective Factors: asking for help    After consideration of C-SSRS screening results, C-SSRS assessments, and this professional's assessment the patient's overall suicide risk assessed to be:  [] Low   [] Moderate   [x] High     [x] Discussed current suicide risk, protective and risk factors with treatment team to determine safety interventions as applicable.     Gender:  [] Male [x] Female [x] Transgender  [] Other    Sexual Orientation:  [] Heterosexual [x] Homosexual [x] Bisexual [] Other    Homicidal Ideation:  [] Past [x] Present [] Denies but no one specific    Onset and Duration of Problem: record says since teen years    Current or Past Mental Health and/or Addictions Treatment (and response to treatment):  [x] Yes, When and Where:   not want to discuss, although here several times in past  [] No    Substance Use/Alcohol Use/Addiction (document name of substance, age of onset, how much and how often, route of use and date of last use):  [x] Reports [] Denies   not discuss but UDS was + for cocaine, THC and Amphetamines    History of Biomedical Complications Associated with Substance Use/Abuse:  [] Reports [x] Denies  Specify:    Family History of Mental Illness or Substance Use/Abuse:  [] Yes (Specify)  [x] No    Trauma and Abuse History: Patient did not provide information about trauma and abuse history.  [] Reports [x] Denies  Specify:      Legal History:  []  Yes (Specify)  [] No Not

## 2024-12-27 NOTE — ED NOTES
Patient sitting up in bed, eyes open. Blankets pushed to side of bed. Pillow supporting head and neck. Patient sucking thumb. NO IV . Bilateral rails up for safety. No respiratory distress observed. Breathing is slow and even; unlabored. Vital signs monitored PRN and per medical order.

## 2024-12-27 NOTE — H&P
[facility]  Inpatient Admission Note    Date of Service:  12/27/24    Historian(s): Eleanor Galvan and chart review  Referral Source:     Chief Complaint   Suicidal thoughts and thoughts of hurting people.    History of Present Illness   Review of the records indicate:    Patient is a 34-year-old -American adult, female to male, who desires to be called \"Q\".  Patient identifies as male.  In the ED, patient stated that he planned on cutting himself and overdosing in a suicide attempt.  Reported that he took about 4 Seroquel, 4 days ago, in an attempt to overdose.  At that time, he stated that he was taking Seroquel 400 mg tablets.  He also stated that he is homicidal and wants to hurt certain people but did not give names.  He also stated that he is seeing shadows and hearing voices and again, was not specific.  Records also indicate that patient has had 85 ED visits this year alone.    As I approached the patient for the interview in his room, I found him laying in bed and presented as irritable and guarded.  He did not want to elaborate or share any details what brought him to the hospital except that he is suicidal as well as homicidal.  He stated that he just wants to continue on the medication he was taking outside namely, Seroquel, trazodone, and Prozac.  Patient did not give any details regarding who prescribes him these and why does he take those medications.    Patient says that he is currently homeless but records indicate that he was living with his cousin.    In terms of drug use, patient acknowledged using marijuana and cocaine as well as smoking cigarettes 2 packs daily.  He also reports regular use of alcohol but did not elaborate if he has history of DTs.  Patient reports that he overdosed 3 years ago.        Psychiatric Review of Systems   Depression: Reports depressed mood, episodic tearfulness, anhedonia and feelings of guilt, hopelessness, helplessness and

## 2024-12-28 PROCEDURE — 6370000000 HC RX 637 (ALT 250 FOR IP): Performed by: PSYCHIATRY & NEUROLOGY

## 2024-12-28 PROCEDURE — 99232 SBSQ HOSP IP/OBS MODERATE 35: CPT | Performed by: PSYCHIATRY & NEUROLOGY

## 2024-12-28 PROCEDURE — 1240000000 HC EMOTIONAL WELLNESS R&B

## 2024-12-28 PROCEDURE — 6370000000 HC RX 637 (ALT 250 FOR IP)

## 2024-12-28 PROCEDURE — 6370000000 HC RX 637 (ALT 250 FOR IP): Performed by: STUDENT IN AN ORGANIZED HEALTH CARE EDUCATION/TRAINING PROGRAM

## 2024-12-28 RX ADMIN — PHENAZOPYRIDINE 200 MG: 200 TABLET ORAL at 16:43

## 2024-12-28 RX ADMIN — THIAMINE HCL TAB 100 MG 100 MG: 100 TAB at 08:21

## 2024-12-28 RX ADMIN — PHENAZOPYRIDINE 200 MG: 200 TABLET ORAL at 08:20

## 2024-12-28 RX ADMIN — ACETAMINOPHEN 650 MG: 325 TABLET ORAL at 21:15

## 2024-12-28 RX ADMIN — QUETIAPINE FUMARATE 300 MG: 300 TABLET ORAL at 20:37

## 2024-12-28 RX ADMIN — PHENAZOPYRIDINE 200 MG: 200 TABLET ORAL at 11:16

## 2024-12-28 RX ADMIN — FERROUS SULFATE TAB 325 MG (65 MG ELEMENTAL FE) 325 MG: 325 (65 FE) TAB at 08:21

## 2024-12-28 RX ADMIN — TRAZODONE HYDROCHLORIDE 150 MG: 50 TABLET ORAL at 20:37

## 2024-12-28 ASSESSMENT — PAIN DESCRIPTION - LOCATION: LOCATION: MOUTH

## 2024-12-28 ASSESSMENT — PAIN DESCRIPTION - DESCRIPTORS: DESCRIPTORS: ACHING

## 2024-12-28 ASSESSMENT — PAIN SCALES - GENERAL: PAINLEVEL_OUTOF10: 3

## 2024-12-28 ASSESSMENT — PAIN DESCRIPTION - ORIENTATION: ORIENTATION: RIGHT;LEFT

## 2024-12-28 NOTE — PROGRESS NOTES
[facility]  Inpatient Progress Note     Date of Service: 12/28/24  Hospital Day: 2     Subjective/Interval History   12/28/24    Treatment Team Notes:  Notes reviewed and/or discussed and report that Eleanor Galvan is 34-year-old -American adult, female to male, who desires to be called \"Q\".  Patient identifies as male.  In the ED, patient stated that he planned on cutting himself and overdosing in a suicide attempt.  Reported that he took about 4 Seroquel, 4 days ago, in an attempt to overdose.  At that time, he stated that he was taking Seroquel 400 mg tablets.  He also stated that he is homicidal and wants to hurt certain people but did not give names.  He also stated that he is seeing shadows and hearing voices and again, was not specific.  Records also indicate that patient has had 85 ED visits this year alone.       Patient interview: Eleanor Galvan was interviewed by this writer today.  Patient was seen in his room and remains irritable, demanding, intrusive, and somewhat intimidating.        Objective     Vitals:    12/27/24 1943   Resp: 19   SpO2: 98%       No results found for this or any previous visit (from the past 24 hour(s)).    Mental Status Examination     Appearance/Hygiene 34 y.o. Black /  adult  Hygiene: In hospital scrubs   Behavior/Social Relatedness Irritable   Musculoskeletal Gait/Station: appropriate  Tone (flaccid, cogwheeling, spastic): not assessed  Psychomotor (hyperkinetic, hypokinetic): calm   Involuntary movements (tics, dyskinesias, akathisa, stereotypies): none   Speech   Rate, rhythm, volume, fluency and articulation are appropriate   Mood   Not okay   Affect    Irritable and labile   Thought Process Disorganized   Thought Content and Perceptual Disturbances Denies delusions, ideas of reference, overvalued ideas, ruminations, obsession, compulsions, and phobias     Reports suicidal as well as homicidal thoughts      Reports

## 2024-12-28 NOTE — GROUP NOTE
Art Therapy Group Progress Note    PATIENT SCHEDULED FOR GROUP AT:  12:20 PM    GROUP STOP TIME:  1:00 PM    ATTENDANCE:  Moderate (3/6 participants)    TOPIC / FOCUS: Draw your Dragons (problems) or draw your wall (barriers)    GOALS: identify problems or difficult feelings, encourage creativity, promote externalization, increase feelings of autonomy, increase personal awareness, encourage emotional expression and communication, reduce feelings of shame, decrease stress and anxiety, encourage problem solving skills and appropriate coping skills      PARTICIPATION LEVEL:  Pt did not attend.     Scott Tomlinson  Art Therapist, MA, ATR-P  Provisional Registered Art Therapist

## 2024-12-28 NOTE — PROGRESS NOTES
ANGELO HCandy T. Note: Hand off was given the above pt was out in the common area engaging with staff and peers.   15:39- the above pt is playing cards at this time   15:45- pt is playing cards  15:00- the above pt still out in common area playing cards  15:17 pt is still playing cards  15:31- the above pt is eating her dinner and consumed 100% of her meal.  15:45- the above pt is talking to the nurse and medication compliance and tolerating her medication  16:00- the above or has been watching tv  16:15- the above pt has been watching tv  16:30- the above pt has been watching tv  16:45- the above pt had been watching tv  17:00- the above pt has been watching tv  and at time will get up and pace when out in the common area.  16:15 he above pt has frequented to leave and she began to curse. And verbalize “ get me the fuck out of here”. She also was educated on the the process of discharge. She verbalize “ get me the fuck from out of here”. She continue to have very poor insight and does not want to listen to staff.   16:27 she continue to pace while she is out here in the day area.   16:30- she is currently watching tv and ducking her thumb  16;45- the above pt is watching tv  17:00- the above pt watching tv  17:15 the above pt watching tv  17:30- the above pt watching tv  17:45- the above pt watching tv  18:00- the above pt watching tv  18:15- the above pt watching tv  18:30- the above pt watching tv  18:45- the above ot watching tv  19:00- pt in the bathroom   19:15- the above  pt is watching tv  19:30 pt is talking to the nurse about her medications

## 2024-12-28 NOTE — PROGRESS NOTES
Pt appeared to have slept for 9.5 hrs .Pt was able to ambulate to the bathroom x1 . Pt is still in bed sleeping no behavior issues staff will continue to monitor pt at bedside Q15

## 2024-12-28 NOTE — PROGRESS NOTES
Pt agitated and making comments about wanting to kill random people. Pt pacing. Pt states \"I want to leave!\" Dr. Conteh called and notified. Dr. Conteh gave verbal order for TDO eval if patient wants to leave. Pt notified that he will need a TDO eval. Pt became belligerent  and began yelling at and threatening Brandon ramsay. Offered pt medication to help calm him. Pt refused TDO eval and medication and wants to stay voluntarily. Pt continues to pace around unit.

## 2024-12-29 LAB
BACTERIA SPEC CULT: ABNORMAL
BACTERIA SPEC CULT: ABNORMAL
CC UR VC: ABNORMAL
SERVICE CMNT-IMP: ABNORMAL

## 2024-12-29 PROCEDURE — 6370000000 HC RX 637 (ALT 250 FOR IP): Performed by: PSYCHIATRY & NEUROLOGY

## 2024-12-29 PROCEDURE — 6370000000 HC RX 637 (ALT 250 FOR IP): Performed by: STUDENT IN AN ORGANIZED HEALTH CARE EDUCATION/TRAINING PROGRAM

## 2024-12-29 PROCEDURE — 1240000000 HC EMOTIONAL WELLNESS R&B

## 2024-12-29 PROCEDURE — 6370000000 HC RX 637 (ALT 250 FOR IP)

## 2024-12-29 PROCEDURE — 99232 SBSQ HOSP IP/OBS MODERATE 35: CPT | Performed by: PSYCHIATRY & NEUROLOGY

## 2024-12-29 RX ADMIN — PHENAZOPYRIDINE 200 MG: 200 TABLET ORAL at 08:44

## 2024-12-29 RX ADMIN — FLUOXETINE HYDROCHLORIDE 20 MG: 20 CAPSULE ORAL at 10:55

## 2024-12-29 RX ADMIN — QUETIAPINE FUMARATE 300 MG: 300 TABLET ORAL at 19:56

## 2024-12-29 RX ADMIN — TRAZODONE HYDROCHLORIDE 150 MG: 50 TABLET ORAL at 19:56

## 2024-12-29 RX ADMIN — ACETAMINOPHEN 650 MG: 325 TABLET ORAL at 14:44

## 2024-12-29 RX ADMIN — ACETAMINOPHEN 650 MG: 325 TABLET ORAL at 18:58

## 2024-12-29 RX ADMIN — THIAMINE HCL TAB 100 MG 100 MG: 100 TAB at 08:44

## 2024-12-29 RX ADMIN — FERROUS SULFATE TAB 325 MG (65 MG ELEMENTAL FE) 325 MG: 325 (65 FE) TAB at 08:44

## 2024-12-29 ASSESSMENT — PAIN DESCRIPTION - LOCATION
LOCATION: TEETH
LOCATION: TEETH

## 2024-12-29 ASSESSMENT — PAIN SCALES - GENERAL
PAINLEVEL_OUTOF10: 0
PAINLEVEL_OUTOF10: 4
PAINLEVEL_OUTOF10: 5

## 2024-12-29 NOTE — PROGRESS NOTES
Patient appeared to have slept 7.5 hrs thus far. Waking to toilet and for a drink. No issues getting back to sleep nor any behavioral issues this shift. Will continue to monitor 1:1.

## 2024-12-29 NOTE — PROGRESS NOTES
19:45- pt has been watching tv  20:00 pt consumed her snack 100% at snack time   20:15- the above pt still engaging with peers and watching tv  20:30- she still continue to engages and watch tv she was able to focus on her steadies from earlier me she was receptive of the positive coping skills and feedback that was given to the above pt.  20:45- the above pt is still watching tv  21:00- the above or is watching lion jacey at this time   21:15- the above pt is in bed resting  21:30- the above or still resting at this time   21:45- the above pt is resting   22:00- pt is resting   22:15- The above pt still continue to rest at this time.   22:30- The above pt has been resting at this time.  22:45- The above pt has been still resting at this time.  23:00- The above pt has been still resting at this time.   23:15-The above pt is still resting.Hand off was given to the on coming staff at this time.

## 2024-12-29 NOTE — PROGRESS NOTES
[facility]  Inpatient Progress Note     Date of Service: 12/29/24  Hospital Day: 3     Subjective/Interval History   12/29/24    Treatment Team Notes:  Notes reviewed and/or discussed and report that Eleanor Galvan \"Q\" is a  34-year-old -American adult, female to male, who desires to be called \"Q\". Patient identifies as male. In the ED, patient stated that he planned on cutting himself and overdosing in a suicide attempt. Reported that he took about 4 Seroquel, 4 days ago, in an attempt to overdose. At that time, he stated that he was taking Seroquel 400 mg tablets. He also stated that he is homicidal and wants to hurt certain people but did not give names. He also stated that he is seeing shadows and hearing voices and again, was not specific. Records also indicate that patient has had 85 ED visits this year alone.       Patient interview: Eleanor Galvan was interviewed by this writer today.  Staff report that patient remains irritable and easily agitated and does not respect boundaries.  He also comes across as intimidating and spiteful.      Objective     Vitals:    12/28/24 1936   Resp: 18       No results found for this or any previous visit (from the past 24 hour(s)).    Mental Status Examination     Appearance/Hygiene 34 y.o. Black /  adult  Hygiene: In hospital scrubs   Behavior/Social Relatedness Remains irritable   Musculoskeletal Gait/Station: appropriate  Tone (flaccid, cogwheeling, spastic): not assessed  Psychomotor (hyperkinetic, hypokinetic): calm   Involuntary movements (tics, dyskinesias, akathisa, stereotypies): none   Speech   Rate, rhythm, volume, fluency and articulation are appropriate   Mood   No response   Affect    irritable and labile   Thought Process Disorganized   Thought Content and Perceptual Disturbances Denies delusions, ideas of reference, overvalued ideas, ruminations, obsession, compulsions, and phobias     Reports suicidal

## 2024-12-30 VITALS
SYSTOLIC BLOOD PRESSURE: 110 MMHG | BODY MASS INDEX: 33.91 KG/M2 | WEIGHT: 211 LBS | TEMPERATURE: 97.2 F | OXYGEN SATURATION: 98 % | HEART RATE: 82 BPM | HEIGHT: 66 IN | DIASTOLIC BLOOD PRESSURE: 72 MMHG | RESPIRATION RATE: 16 BRPM

## 2024-12-30 PROCEDURE — 6370000000 HC RX 637 (ALT 250 FOR IP)

## 2024-12-30 PROCEDURE — 6370000000 HC RX 637 (ALT 250 FOR IP): Performed by: PSYCHIATRY & NEUROLOGY

## 2024-12-30 PROCEDURE — 99239 HOSP IP/OBS DSCHRG MGMT >30: CPT | Performed by: PSYCHIATRY & NEUROLOGY

## 2024-12-30 RX ORDER — TRAZODONE HYDROCHLORIDE 150 MG/1
150 TABLET ORAL NIGHTLY
Qty: 30 TABLET | Refills: 0 | Status: SHIPPED | OUTPATIENT
Start: 2024-12-30 | End: 2024-12-30

## 2024-12-30 RX ORDER — QUETIAPINE FUMARATE 300 MG/1
300 TABLET, FILM COATED ORAL NIGHTLY
Qty: 30 TABLET | Refills: 0 | Status: SHIPPED | OUTPATIENT
Start: 2024-12-30

## 2024-12-30 RX ORDER — QUETIAPINE FUMARATE 300 MG/1
300 TABLET, FILM COATED ORAL NIGHTLY
Qty: 30 TABLET | Refills: 0 | Status: SHIPPED | OUTPATIENT
Start: 2024-12-30 | End: 2024-12-30

## 2024-12-30 RX ORDER — TRAZODONE HYDROCHLORIDE 150 MG/1
150 TABLET ORAL NIGHTLY
Qty: 30 TABLET | Refills: 0 | Status: SHIPPED | OUTPATIENT
Start: 2024-12-30

## 2024-12-30 RX ORDER — FERROUS SULFATE 325(65) MG
325 TABLET ORAL
Qty: 30 TABLET | Refills: 0 | Status: SHIPPED | OUTPATIENT
Start: 2024-12-30

## 2024-12-30 RX ADMIN — FLUOXETINE HYDROCHLORIDE 20 MG: 20 CAPSULE ORAL at 07:52

## 2024-12-30 RX ADMIN — ACETAMINOPHEN 650 MG: 325 TABLET ORAL at 07:53

## 2024-12-30 RX ADMIN — FERROUS SULFATE TAB 325 MG (65 MG ELEMENTAL FE) 325 MG: 325 (65 FE) TAB at 07:52

## 2024-12-30 RX ADMIN — THIAMINE HCL TAB 100 MG 100 MG: 100 TAB at 07:52

## 2024-12-30 ASSESSMENT — PAIN DESCRIPTION - LOCATION: LOCATION: TEETH

## 2024-12-30 ASSESSMENT — PAIN DESCRIPTION - DESCRIPTORS: DESCRIPTORS: ACHING

## 2024-12-30 ASSESSMENT — PAIN SCALES - GENERAL: PAINLEVEL_OUTOF10: 4
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
## 2024-12-30 NOTE — PROGRESS NOTES
Pt cooperative with bright affect. Pt highly social with peers and staff. Pt denies SI/HI/AH/CH/VH/anxiety/depression/thoughts of self-harm. Pt independent to meals and medications. Pt verbalizing desire to discharge home this shift. MD informed.   Pt's discharge paperwork and prescriptions reviewed with Pt by Gali REZA. Pt verbalizing understanding of discharge paperwork and prescriptions. Pt verbalizing intent to continue with out Pt tx. Pt given belongings from locked belongings room and security at discharge. Pt escorted off unit by staff. Pt safety maintained through discharge.

## 2024-12-30 NOTE — BSMART NOTE
Assessment Axis I.  Schizoaffective disorder depressive type.  Alcohol use disorder moderate.  Cocaine use disorder moderate.  Cannabis use disorder moderate.     Axis II.  None.     Axis III.  Iron deficiency anemia.      Pt.'s case was discussed in staffing. Pt is requesting to be dc.   Covering SW met with pt to discuss dc plan. Pt is encouraged to take medications. Pt plans to stay with girlfriend. Pt reports to having mother and girlfriend as apart of safety plan.  Pt plans to follow up outpt mental health services with [facility]3]. Pt denies ideations and hallucinations. Pt is encouraged to follow up with intake M-Th 8:00-12:30 at [facility]3] [address] [place] [address]   [place] [address] [phone].    Pt requested a bus pass     Glory Santana MA, LMHP-R

## 2024-12-30 NOTE — PROGRESS NOTES
Patient slept approximately 9 hours with minimal lapse. There are no concerns as of present, close one to one observation to continue.

## 2024-12-30 NOTE — DISCHARGE INSTRUCTIONS
BEHAVIORAL HEALTH NURSING DISCHARGE NOTE      The following personal items collected during your admission are returned to you:         PATIENT INSTRUCTIONS:    What to do at Home    You may not operate a vehicle for 24-hours.    You may not engage in an occupation involving machinery or appliances.    You may not drink any alcoholic beverages during the next 48-hours.    You may resume normal activities.    Avoid making any crit ical decisions for at least 24-hours.    Recommended diet: regular diet    Recommended activity: activity as tolerated.      The discharge information has been reviewed with the patient.  The patient verbalized understanding.

## 2025-02-18 ENCOUNTER — APPOINTMENT (OUTPATIENT)
Facility: HOSPITAL | Age: 35
End: 2025-02-18
Payer: MEDICAID

## 2025-02-18 ENCOUNTER — HOSPITAL ENCOUNTER (EMERGENCY)
Facility: HOSPITAL | Age: 35
Discharge: HOME | End: 2025-02-18
Attending: EMERGENCY MEDICINE
Payer: MEDICAID

## 2025-02-18 VITALS
HEART RATE: 82 BPM | OXYGEN SATURATION: 100 % | HEIGHT: 66 IN | SYSTOLIC BLOOD PRESSURE: 143 MMHG | BODY MASS INDEX: 34.06 KG/M2 | DIASTOLIC BLOOD PRESSURE: 81 MMHG | TEMPERATURE: 98.3 F | RESPIRATION RATE: 16 BRPM

## 2025-02-18 DIAGNOSIS — S62.666A CLOSED NONDISPLACED FRACTURE OF DISTAL PHALANX OF RIGHT LITTLE FINGER, INITIAL ENCOUNTER: Primary | ICD-10-CM

## 2025-02-18 DIAGNOSIS — R45.850 HOMICIDAL IDEATION: ICD-10-CM

## 2025-02-18 DIAGNOSIS — F25.0 SCHIZOAFFECTIVE DISORDER, BIPOLAR TYPE (HCC): ICD-10-CM

## 2025-02-18 DIAGNOSIS — Z00.8 EVALUATION BY PSYCHIATRIC SERVICE REQUIRED: ICD-10-CM

## 2025-02-18 PROCEDURE — 73130 X-RAY EXAM OF HAND: CPT

## 2025-02-18 PROCEDURE — 99283 EMERGENCY DEPT VISIT LOW MDM: CPT

## 2025-02-18 PROCEDURE — 6370000000 HC RX 637 (ALT 250 FOR IP): Performed by: EMERGENCY MEDICINE

## 2025-02-18 RX ORDER — ACETAMINOPHEN 500 MG
1000 TABLET ORAL
Status: COMPLETED | OUTPATIENT
Start: 2025-02-18 | End: 2025-02-18

## 2025-02-18 RX ORDER — ACETAMINOPHEN 325 MG/1
650 TABLET ORAL EVERY 6 HOURS PRN
Qty: 30 TABLET | Refills: 0 | Status: SHIPPED | OUTPATIENT
Start: 2025-02-18

## 2025-02-18 RX ADMIN — ACETAMINOPHEN 1000 MG: 500 TABLET ORAL at 02:21

## 2025-02-18 ASSESSMENT — ENCOUNTER SYMPTOMS
SHORTNESS OF BREATH: 0
ABDOMINAL PAIN: 0
SORE THROAT: 0
DIARRHEA: 0
VOMITING: 0
NAUSEA: 0
COUGH: 0
EYE PAIN: 0
RHINORRHEA: 0

## 2025-02-18 ASSESSMENT — PAIN - FUNCTIONAL ASSESSMENT: PAIN_FUNCTIONAL_ASSESSMENT: 0-10

## 2025-02-18 ASSESSMENT — PAIN SCALES - GENERAL: PAINLEVEL_OUTOF10: 10
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
## 2025-02-18 NOTE — ED PROVIDER NOTES
to the patient's satisfaction. Strict return precautions given. He and/or family conveyed understanding and agreement with care plan. Vital signs stable for discharge.     PLAN  1. Return precautions as discussed with patient and available family/caregiver.     2. DISCHARGE MEDICATIONS:     Medication List        START taking these medications      acetaminophen 325 MG tablet  Commonly known as: Aminofen  Take 2 tablets by mouth every 6 hours as needed for Pain            ASK your doctor about these medications      ferrous sulfate 325 (65 Fe) MG tablet  Commonly known as: IRON 325  Take 1 tablet by mouth daily (with breakfast)     FLUoxetine 20 MG capsule  Commonly known as: PROZAC  Take 1 capsule by mouth daily     QUEtiapine 300 MG tablet  Commonly known as: SEROQUEL  Take 1 tablet by mouth nightly     traZODone 150 MG tablet  Commonly known as: DESYREL  Take 1 tablet by mouth nightly               Where to Get Your Medications        These medications were sent to [id number] - [place], [place] - [address] [facility] [facility] - P [phone] - F [phone] [address] [facility] [address]      Phone: [phone]   acetaminophen 325 MG tablet           3. PATIENT REFERRED TO:  Critical access hospital Emergency Department  [address] [facility] [address]  [phone]    As needed, If symptoms worsen    Bobby Florez MD  [address] [facility]  [address]  [facility] [id number] [phone]          [facility]  [address] [facility] [address]  [phone]                                CLINICAL IMPRESSION     1. Closed nondisplaced fracture of distal phalanx of right little finger, initial encounter    2. Schizoaffective disorder, bipolar type (HCC)    3. Evaluation by psychiatric service required    4. Homicidal ideation      Attestation:  I am the attending of record for this patient. I personally performed the services described in this documentation on this

## 2025-02-18 NOTE — ED NOTES
Attempted to set up ride for pt by , pt told rn that pt could \"break your neck.\" Pt stormed out of ED. No ride set up

## 2025-02-18 NOTE — DISCHARGE INSTRUCTIONS
[facility] Substance Abuse Treatment Resources    [facility]  [address] [place] [address]       Appointments scheduled using triage protocol. Patients will be evaluated and referred to appropriate treatment. A  is usually assigned, but there is usually a waiting list. All [place] residents without financial resources may be referred to [facility]. Patients must bring proof that they are residents of the [facility] (utility bill, rent receipt, picture ID, etc.).   [phone]  Crisis: [phone]   [facility] - ALL [facility] SERVICE REFERRALS MUST GO THROUGH [facility]  [facility]  [address] [facility]    [facility]  [address] [facility]    [facility]  [address] [place] [address]     [phone]  Detox unit: [phone]   Human Resources, [facility]  [address] [facility]       Intakes are Monday, Wednesday, Thursday from 8 AM - 12 noon. Patients may walk in any day and speak with a counselor. Patient must bring a picture ID.   [phone]   [facility]  [address] [facility]       No detox available. Patient must be medically stable and able to work. Patient needs social security card and an ID. Patient does not need to be homeless.   [phone]   The [address]       Detoxification available. Patients must be medically-cleared to go to detox and must be free of benzodiazepines and barbituates. [facility] prefers if they also have Clonidine available to help them with their detox.   [phone]   [facility]  [address] [facility]       Jew-based AA program available for men. Patients need to be able to work and follow rules. 90 days inpatient followed by 90 days in a alf house.   [phone]   [facility] that hosts a number of AA and NA groups each week   [phone]   [facility] [employer]  [facility] [address]

## 2025-02-18 NOTE — ED NOTES
Pt presents ambulatory to ED complaining of R hand pain. Pt is alert and oriented x 4, RR even and unlabored, skin is warm and dry. Assesment completed and pt updated on plan of care.       Emergency Department Nursing Plan of Care       The Nursing Plan of Care is developed from the Nursing assessment and Emergency Department Attending provider initial evaluation.  The plan of care may be reviewed in the “ED Provider note”.    The Plan of Care was developed with the following considerations:   Patient / Family readiness to learn indicated by:verbalized understanding  Persons(s) to be included in education: patient  Barriers to Learning/Limitations:None    Signed     Donna Mata RN    2/18/2025   2:05 AM

## 2025-02-18 NOTE — BSMART NOTE
BSMART assessment completed, and suicide risk level noted to be no risk. Primary Nurse Donna and Charge Nurse Dayan and Physician Sigifredo notified. Concerns not observed.